# Patient Record
Sex: FEMALE | Race: WHITE | NOT HISPANIC OR LATINO | Employment: UNEMPLOYED | URBAN - METROPOLITAN AREA
[De-identification: names, ages, dates, MRNs, and addresses within clinical notes are randomized per-mention and may not be internally consistent; named-entity substitution may affect disease eponyms.]

---

## 2017-11-07 ENCOUNTER — OFFICE VISIT (OUTPATIENT)
Dept: OBSTETRICS AND GYNECOLOGY | Facility: CLINIC | Age: 57
End: 2017-11-07
Payer: MEDICAID

## 2017-11-07 VITALS
BODY MASS INDEX: 28.28 KG/M2 | DIASTOLIC BLOOD PRESSURE: 80 MMHG | HEIGHT: 65 IN | WEIGHT: 169.75 LBS | SYSTOLIC BLOOD PRESSURE: 140 MMHG

## 2017-11-07 DIAGNOSIS — R10.2 FEMALE PELVIC PAIN: ICD-10-CM

## 2017-11-07 DIAGNOSIS — Z80.3 FAMILY HISTORY OF BREAST CANCER IN MALE: ICD-10-CM

## 2017-11-07 DIAGNOSIS — N95.0 POSTMENOPAUSAL BLEEDING: ICD-10-CM

## 2017-11-07 DIAGNOSIS — Z01.411 ENCOUNTER FOR GYNECOLOGICAL EXAMINATION (GENERAL) (ROUTINE) WITH ABNORMAL FINDINGS: Primary | ICD-10-CM

## 2017-11-07 PROCEDURE — 99386 PREV VISIT NEW AGE 40-64: CPT | Mod: S$PBB,,, | Performed by: OBSTETRICS & GYNECOLOGY

## 2017-11-07 PROCEDURE — 87624 HPV HI-RISK TYP POOLED RSLT: CPT

## 2017-11-07 PROCEDURE — 99213 OFFICE O/P EST LOW 20 MIN: CPT | Mod: PBBFAC | Performed by: OBSTETRICS & GYNECOLOGY

## 2017-11-07 PROCEDURE — 99999 PR PBB SHADOW E&M-EST. PATIENT-LVL III: CPT | Mod: PBBFAC,,, | Performed by: OBSTETRICS & GYNECOLOGY

## 2017-11-07 PROCEDURE — 88175 CYTOPATH C/V AUTO FLUID REDO: CPT

## 2017-11-07 NOTE — PROGRESS NOTES
Chief Complaint   Patient presents with    Well Woman    Pelvic Pain    Low-back Pain       HPI:  57 y.o. female  presents as a new patient to me for a well woman exam    - Menopausal: YES    - History of abnormal paps: NO  - Postmenopausal bleedin EPISODES FOLLOWING INTERCOURSE; 1 WAS SPOTTING, THE OTHER WAS HEAVIER WITH CRAMPING  - History of abnormal mammograms: DENIES  - Family history of breast or ovarian cancer: MATERNAL UNCLE (MOTHER'S BROTHER) WITH BREAST CANCER  - Any breast masses, pain, skin changes, or nipple discharge: DENIES      Pap: 3/3/2014, NILM  Mammogram: , NORMAL PER PATIENT REPORT    Past Medical History:   Diagnosis Date    Colon polyps     Gastric bypass status for obesity 2006    Hypertension     Postmenopausal HRT (hormone replacement therapy)     Ulcer      Past Surgical History:   Procedure Laterality Date    ABDOMINAL SURGERY      BREAST BIOPSY  2005    right    CHOLECYSTECTOMY      GASTRIC BYPASS      zackary en Y    HERNIA REPAIR  x2    LI hernia    INFECTED SKIN DEBRIDEMENT  x3    KNEE ARTHROSCOPY W/ DEBRIDEMENT      right       Social History   Substance Use Topics    Smoking status: Never Smoker    Smokeless tobacco: Never Used    Alcohol use No      Comment:       Family History   Problem Relation Age of Onset    Cancer Mother      colon    Dementia Father     Hypertension Father     Hyperlipidemia Father     Alzheimer's disease Father 78    Cancer Paternal Grandfather      colon cancer     OB History    Para Term  AB Living   1       1     SAB TAB Ectopic Multiple Live Births   1              # Outcome Date GA Lbr Kuldeep/2nd Weight Sex Delivery Anes PTL Lv   1 SAB                   MEDICATIONS: Reviewed with patient.  ALLERGIES: Aspirin; Ibuprofen; and Inderal [propranolol]     ROS:  Review of Systems   Constitutional: Negative for fever.   Respiratory: Negative for shortness of breath.    Cardiovascular: Negative for chest pain.  "  Gastrointestinal: Negative for abdominal pain, nausea and vomiting.   Endocrine: Negative for hot flashes.   Genitourinary: Positive for pelvic pain and postmenopausal bleeding.   Neurological: Negative for headaches.   Hematological: Does not bruise/bleed easily.   Psychiatric/Behavioral: Negative for depression.   Breast: Negative for breast mass, breast pain, nipple discharge and skin changes      PHYSICAL EXAM:    BP (!) 140/80   Ht 5' 5" (1.651 m)   Wt 77 kg (169 lb 12.1 oz)   LMP 12/24/2012   BMI 28.25 kg/m²     Physical Exam:   Constitutional: She is oriented to person, place, and time. She appears well-developed.   No fever    HENT:   Head: Normocephalic.     Neck: No thyromegaly present.    Cardiovascular: Normal rate.     Pulmonary/Chest: Effort normal. Right breast exhibits no mass, no nipple discharge, no skin change, no tenderness and no swelling. Left breast exhibits no mass, no nipple discharge, no skin change, no tenderness and no swelling. Breasts are symmetrical.        Abdominal: She exhibits no mass. There is no hepatosplenomegaly. There is no tenderness.     Genitourinary: Vagina normal. Uterus is not enlarged and not tender. Cervix is normal. Right adnexum displays no tenderness and no fullness. Left adnexum displays no tenderness and no fullness. No vaginal discharge found.   Genitourinary Comments: External genitalia: Normal  Urethra: No tenderness; normal meatus  Bladder: No tenderness              Lymphadenopathy:     She has no cervical adenopathy.    Neurological: She is alert and oriented to person, place, and time.     Psychiatric: She has a normal mood and affect.         ASSESSMENT & PLAN:   Encounter for gynecological examination (general) (routine) with abnormal findings  -     Mammo Digital Screening Bilat with Tomosynthesis CAD; Future; Expected date: 11/07/2017  -     HPV High Risk Genotypes, PCR  -     Liquid-based pap smear, screening    Postmenopausal bleeding  -     US " Pelvis Complete Non OB; Future; Expected date: 11/07/2017    Female pelvic pain  -     US Pelvis Complete Non OB; Future; Expected date: 11/07/2017    Family history of breast cancer in male        - Breast and pelvic exam: NORMAL  - Patient was counseled on ASCCP guidelines for cervical cytology screening  - Cervical screening: CO-TESTING PERFORMED TODAY  - Patient was counseled on current recommendations for breast cancer screening  - Mammogram screening: WILL SCHEDULE    - She was counseled to follow up with her PCP for other routine health maintenance    - Will schedule pelvic u/s to evaluate patient's PMB and pelvic pain  - RTC 1 week after u/s for results and further w/u as indicated  - Patient counseled on possible need for endometrial biopsy    - 2nd degree male relative with breast cancer  - Per NCCN guidelines, patient meets criteria for genetic screening and possible BRCA testing  - Will arrange referral for genetic counseling

## 2017-11-08 ENCOUNTER — HOSPITAL ENCOUNTER (OUTPATIENT)
Dept: RADIOLOGY | Facility: OTHER | Age: 57
Discharge: HOME | End: 2017-11-08
Attending: OBSTETRICS & GYNECOLOGY
Payer: MEDICAID

## 2017-11-08 DIAGNOSIS — N95.0 POSTMENOPAUSAL BLEEDING: ICD-10-CM

## 2017-11-08 DIAGNOSIS — R10.2 FEMALE PELVIC PAIN: ICD-10-CM

## 2017-11-08 DIAGNOSIS — Z01.411 ENCOUNTER FOR GYNECOLOGICAL EXAMINATION (GENERAL) (ROUTINE) WITH ABNORMAL FINDINGS: ICD-10-CM

## 2017-11-08 PROCEDURE — 77067 SCR MAMMO BI INCL CAD: CPT | Mod: TC

## 2017-11-08 PROCEDURE — 76856 US EXAM PELVIC COMPLETE: CPT | Mod: TC

## 2017-11-08 PROCEDURE — 77067 SCR MAMMO BI INCL CAD: CPT | Mod: 26,,, | Performed by: INTERNAL MEDICINE

## 2017-11-08 PROCEDURE — 76830 TRANSVAGINAL US NON-OB: CPT | Mod: 26,,, | Performed by: INTERNAL MEDICINE

## 2017-11-08 PROCEDURE — 76856 US EXAM PELVIC COMPLETE: CPT | Mod: 26,,, | Performed by: INTERNAL MEDICINE

## 2017-11-08 PROCEDURE — 77063 BREAST TOMOSYNTHESIS BI: CPT | Mod: 26,,, | Performed by: INTERNAL MEDICINE

## 2017-11-10 LAB
HPV16 AG SPEC QL: NEGATIVE
HPV16+18+H RISK 12 DNA CVX-IMP: NEGATIVE
HPV18 DNA SPEC QL NAA+PROBE: NEGATIVE

## 2019-03-28 ENCOUNTER — PATIENT OUTREACH (OUTPATIENT)
Dept: ADMINISTRATIVE | Facility: HOSPITAL | Age: 59
End: 2019-03-28

## 2019-04-17 ENCOUNTER — HOSPITAL ENCOUNTER (INPATIENT)
Facility: HOSPITAL | Age: 59
LOS: 2 days | Discharge: HOME | DRG: 378 | End: 2019-04-19
Attending: EMERGENCY MEDICINE | Admitting: EMERGENCY MEDICINE
Payer: MEDICAID

## 2019-04-17 DIAGNOSIS — D64.9 SYMPTOMATIC ANEMIA: ICD-10-CM

## 2019-04-17 DIAGNOSIS — K92.2 UPPER GI BLEED: Primary | ICD-10-CM

## 2019-04-17 DIAGNOSIS — K92.1 MELENA: ICD-10-CM

## 2019-04-17 LAB
ABO + RH BLD: NORMAL
ALBUMIN SERPL BCP-MCNC: 2.1 G/DL (ref 3.5–5.2)
ALP SERPL-CCNC: 225 U/L (ref 55–135)
ALT SERPL W/O P-5'-P-CCNC: 22 U/L (ref 10–44)
ANION GAP SERPL CALC-SCNC: 12 MMOL/L (ref 8–16)
ANISOCYTOSIS BLD QL SMEAR: SLIGHT
APTT BLDCRRT: 23.5 SEC (ref 21–32)
AST SERPL-CCNC: 57 U/L (ref 10–40)
BASOPHILS # BLD AUTO: 0.03 K/UL (ref 0–0.2)
BASOPHILS NFR BLD: 0.5 % (ref 0–1.9)
BILIRUB SERPL-MCNC: 0.6 MG/DL (ref 0.1–1)
BLD GP AB SCN CELLS X3 SERPL QL: NORMAL
BUN SERPL-MCNC: 14 MG/DL (ref 6–20)
CALCIUM SERPL-MCNC: 8.5 MG/DL (ref 8.7–10.5)
CHLORIDE SERPL-SCNC: 105 MMOL/L (ref 95–110)
CO2 SERPL-SCNC: 22 MMOL/L (ref 23–29)
CREAT SERPL-MCNC: 0.8 MG/DL (ref 0.5–1.4)
DIFFERENTIAL METHOD: ABNORMAL
EOSINOPHIL # BLD AUTO: 0 K/UL (ref 0–0.5)
EOSINOPHIL NFR BLD: 0.3 % (ref 0–8)
ERYTHROCYTE [DISTWIDTH] IN BLOOD BY AUTOMATED COUNT: 22.1 % (ref 11.5–14.5)
EST. GFR  (AFRICAN AMERICAN): >60 ML/MIN/1.73 M^2
EST. GFR  (NON AFRICAN AMERICAN): >60 ML/MIN/1.73 M^2
GLUCOSE SERPL-MCNC: 118 MG/DL (ref 70–110)
HCT VFR BLD AUTO: 20.2 % (ref 37–48.5)
HGB BLD-MCNC: 6.3 G/DL (ref 12–16)
INR PPP: 0.9 (ref 0.8–1.2)
IRON SERPL-MCNC: 21 UG/DL (ref 30–160)
LIPASE SERPL-CCNC: 18 U/L (ref 4–60)
LYMPHOCYTES # BLD AUTO: 1.3 K/UL (ref 1–4.8)
LYMPHOCYTES NFR BLD: 20 % (ref 18–48)
MCH RBC QN AUTO: 33.5 PG (ref 27–31)
MCHC RBC AUTO-ENTMCNC: 31.2 G/DL (ref 32–36)
MCV RBC AUTO: 107 FL (ref 82–98)
MONOCYTES # BLD AUTO: 0.6 K/UL (ref 0.3–1)
MONOCYTES NFR BLD: 9.7 % (ref 4–15)
NEUTROPHILS # BLD AUTO: 4.4 K/UL (ref 1.8–7.7)
NEUTROPHILS NFR BLD: 70.4 % (ref 38–73)
PLATELET # BLD AUTO: 191 K/UL (ref 150–350)
PLATELET BLD QL SMEAR: ABNORMAL
PMV BLD AUTO: 9.4 FL (ref 9.2–12.9)
POLYCHROMASIA BLD QL SMEAR: ABNORMAL
POTASSIUM SERPL-SCNC: 3.5 MMOL/L (ref 3.5–5.1)
PROT SERPL-MCNC: 5.4 G/DL (ref 6–8.4)
PROTHROMBIN TIME: 9.8 SEC (ref 9–12.5)
RBC # BLD AUTO: 1.88 M/UL (ref 4–5.4)
SATURATED IRON: 11 % (ref 20–50)
SODIUM SERPL-SCNC: 139 MMOL/L (ref 136–145)
TOTAL IRON BINDING CAPACITY: 186 UG/DL (ref 250–450)
TRANSFERRIN SERPL-MCNC: 126 MG/DL (ref 200–375)
WBC # BLD AUTO: 6.39 K/UL (ref 3.9–12.7)

## 2019-04-17 PROCEDURE — 82746 ASSAY OF FOLIC ACID SERUM: CPT

## 2019-04-17 PROCEDURE — 85610 PROTHROMBIN TIME: CPT

## 2019-04-17 PROCEDURE — C9113 INJ PANTOPRAZOLE SODIUM, VIA: HCPCS | Performed by: EMERGENCY MEDICINE

## 2019-04-17 PROCEDURE — 36430 TRANSFUSION BLD/BLD COMPNT: CPT

## 2019-04-17 PROCEDURE — 25000003 PHARM REV CODE 250: Performed by: EMERGENCY MEDICINE

## 2019-04-17 PROCEDURE — 83690 ASSAY OF LIPASE: CPT

## 2019-04-17 PROCEDURE — 80053 COMPREHEN METABOLIC PANEL: CPT

## 2019-04-17 PROCEDURE — 99285 EMERGENCY DEPT VISIT HI MDM: CPT

## 2019-04-17 PROCEDURE — 82607 VITAMIN B-12: CPT

## 2019-04-17 PROCEDURE — 96366 THER/PROPH/DIAG IV INF ADDON: CPT

## 2019-04-17 PROCEDURE — 85730 THROMBOPLASTIN TIME PARTIAL: CPT

## 2019-04-17 PROCEDURE — 86920 COMPATIBILITY TEST SPIN: CPT

## 2019-04-17 PROCEDURE — 85025 COMPLETE CBC W/AUTO DIFF WBC: CPT

## 2019-04-17 PROCEDURE — 83615 LACTATE (LD) (LDH) ENZYME: CPT

## 2019-04-17 PROCEDURE — 63600175 PHARM REV CODE 636 W HCPCS: Performed by: EMERGENCY MEDICINE

## 2019-04-17 PROCEDURE — 21400001 HC TELEMETRY ROOM

## 2019-04-17 PROCEDURE — 96365 THER/PROPH/DIAG IV INF INIT: CPT

## 2019-04-17 PROCEDURE — P9021 RED BLOOD CELLS UNIT: HCPCS

## 2019-04-17 PROCEDURE — 86901 BLOOD TYPING SEROLOGIC RH(D): CPT

## 2019-04-17 PROCEDURE — 83540 ASSAY OF IRON: CPT

## 2019-04-17 PROCEDURE — 85045 AUTOMATED RETICULOCYTE COUNT: CPT

## 2019-04-17 PROCEDURE — 96375 TX/PRO/DX INJ NEW DRUG ADDON: CPT

## 2019-04-17 RX ORDER — ONDANSETRON 2 MG/ML
4 INJECTION INTRAMUSCULAR; INTRAVENOUS
Status: COMPLETED | OUTPATIENT
Start: 2019-04-17 | End: 2019-04-17

## 2019-04-17 RX ORDER — ACETAMINOPHEN 325 MG/1
650 TABLET ORAL EVERY 8 HOURS PRN
Status: DISCONTINUED | OUTPATIENT
Start: 2019-04-17 | End: 2019-04-19 | Stop reason: HOSPADM

## 2019-04-17 RX ORDER — PANTOPRAZOLE SODIUM 40 MG/10ML
80 INJECTION, POWDER, LYOPHILIZED, FOR SOLUTION INTRAVENOUS
Status: COMPLETED | OUTPATIENT
Start: 2019-04-17 | End: 2019-04-17

## 2019-04-17 RX ORDER — HYDROCODONE BITARTRATE AND ACETAMINOPHEN 500; 5 MG/1; MG/1
TABLET ORAL
Status: DISCONTINUED | OUTPATIENT
Start: 2019-04-17 | End: 2019-04-19 | Stop reason: HOSPADM

## 2019-04-17 RX ADMIN — ACETAMINOPHEN 650 MG: 325 TABLET, FILM COATED ORAL at 11:04

## 2019-04-17 RX ADMIN — ONDANSETRON 4 MG: 2 INJECTION INTRAMUSCULAR; INTRAVENOUS at 08:04

## 2019-04-17 RX ADMIN — PANTOPRAZOLE SODIUM 80 MG: 40 INJECTION, POWDER, FOR SOLUTION INTRAVENOUS at 06:04

## 2019-04-17 RX ADMIN — PANTOPRAZOLE SODIUM 8 MG/HR: 40 INJECTION, POWDER, FOR SOLUTION INTRAVENOUS at 06:04

## 2019-04-17 RX ADMIN — SODIUM CHLORIDE: 0.9 INJECTION, SOLUTION INTRAVENOUS at 10:04

## 2019-04-17 RX ADMIN — PANTOPRAZOLE SODIUM 8 MG/HR: 40 INJECTION, POWDER, FOR SOLUTION INTRAVENOUS at 10:04

## 2019-04-17 NOTE — ED PROVIDER NOTES
Encounter Date: 4/17/2019     This is a 58 y.o. female complaining of weakness, lightheadedness, and shortness of breath. Reports foul-smelling stools with probable GI bleeding.    I have evaluated and conducted a medical screening exam with initial orders entered, if indicated, to expedite care. The patient will be placed in a treatment area when one is available. Care will be transferred to an alternate provider for a full assessment including but not limited to: history, physical exam, additional orders, and final disposition.    Grey Wyatt NP      SCRIBE #1 NOTE: INorris, am scribing for, and in the presence of,  Dalia Royal MD. I have scribed the following portions of the note - Other sections scribed: HPI and ROS.       History     Chief Complaint   Patient presents with    Rectal Bleeding     Pt reports 5 day hx of dark stool and bright red bleeding from rectum. Pt states it is the most foul smelling stool she has ever had.      CC: Bloody Stools    HPI: Patient is a 58-year-old female PMHx HTN, postmenopausal hormone replacement therapy and PSHx cholecystectomy, gastric sleeve bypass (2006), hernia repair who presents for emergent consideration of bloody stools since Sat 4 days ago. Patient describes her stool as coffee grounds with bright red blood mixed in. She reports bright red blood on the tissue when wiping. She reports associated fatigue, lightheadedness, SOB with exertion, nausea. Patient states she has difficulty standing more than 5 minutes at a time secondary to fatigue.. She denies chest pain, emesis. She does have a history of bleeding ulcers in 2013. Her last colonoscopy was 2 years ago. She denies use of NSAIDs but reports epigastric discomfort over the past several months and using frequent Tums. No alcohol use. She has not followed up with a bariatric clinic in several years, she states she recently started taking a multi vitamin, she has not had B12 level or iron level  "checked in years. She drinks protein shakes but states only eats very small amounts at a time.           Review of patient's allergies indicates:   Allergen Reactions    Aspirin     Ibuprofen      Other reaction(s): Due To Gastric Bypass  Due to gastric bypass    Inderal [propranolol]      "Felt bizarre"     Past Medical History:   Diagnosis Date    Colon polyps     Gastric bypass status for obesity 2006    Hypertension     Postmenopausal HRT (hormone replacement therapy)     Ulcer      Past Surgical History:   Procedure Laterality Date    ABDOMINAL SURGERY      BREAST BIOPSY  2005    right    CHOLECYSTECTOMY      COLONOSCOPY N/A 3/21/2014    [facility])    EGD (ESOPHAGOGASTRODUODENOSCOPY) N/A 9/27/2013    [facility]formed [facility]    GASTRIC BYPASS      zackary en Y    HERNIA REPAIR  x2    LI hernia    INFECTED SKIN DEBRIDEMENT  x3    KNEE ARTHROSCOPY W/ DEBRIDEMENT      right     Family History   Problem Relation Age of Onset    Cancer Mother         colon    Dementia Father     Hypertension Father     Hyperlipidemia Father     Alzheimer's disease Father 78    Cancer Paternal Grandfather         colon cancer    Breast cancer Maternal Uncle      Social History     Tobacco Use    Smoking status: Never Smoker    Smokeless tobacco: Never Used   Substance Use Topics    Alcohol use: Yes     Alcohol/week: 0.0 oz     Comment: occasional    Drug use: No     Review of Systems   Constitutional: Positive for fatigue.   HENT: Negative for facial swelling.    Eyes: Negative for visual disturbance.   Respiratory: Positive for shortness of breath. Negative for cough.    Cardiovascular: Negative for chest pain.   Gastrointestinal: Positive for abdominal pain, blood in stool and nausea. Negative for vomiting.   Genitourinary: Negative for dysuria.   Musculoskeletal: Positive for myalgias (LEs).   Allergic/Immunologic: Negative for " immunocompromised state.   Neurological: Positive for light-headedness.   All other systems reviewed and are negative.      Physical Exam     Initial Vitals [04/17/19 1630]   BP Pulse Resp Temp SpO2   105/68 105 20 98.5 °F (36.9 °C) 100 %      MAP       --         Physical Exam    Constitutional: She appears well-developed and well-nourished. She is not diaphoretic. No distress.   HENT:   Mouth/Throat: Oropharynx is clear and moist.   Eyes: Conjunctivae are normal. No scleral icterus.   Pale conjunctiva   Neck: Neck supple.   Cardiovascular: Regular rhythm. Tachycardia present.    Pulmonary/Chest: Breath sounds normal. No respiratory distress.   Abdominal: Soft. Bowel sounds are normal. She exhibits no distension. There is no tenderness.   Genitourinary: Rectal exam shows external hemorrhoid and guaiac positive stool. Rectal exam shows anal tone normal. Guaiac positive stool. : Acceptable.  Genitourinary Comments: Melenotic stool on SHARYN   Neurological: She is alert and oriented to person, place, and time.         ED Course   Critical Care  Date/Time: 5/7/2019 3:13 PM  Performed by: Dalia Royal MD  Authorized by: Leonila Alonzo MD   Direct patient critical care time: 15 minutes  Ordering / reviewing critical care time: 10 minutes  Documentation critical care time: 5 minutes  Consulting other physicians critical care time: 5 minutes  Total critical care time (exclusive of procedural time) : 35 minutes  Critical care was necessary to treat or prevent imminent or life-threatening deterioration of the following conditions: circulatory failure.  Critical care was time spent personally by me on the following activities: discussions with consultants, examination of patient, obtaining history from patient or surrogate, ordering and performing treatments and interventions and ordering and review of laboratory studies.        Labs Reviewed   CBC W/ AUTO DIFFERENTIAL - Abnormal; Notable for the  following components:       Result Value    RBC 1.88 (*)     Hemoglobin 6.3 (*)     Hematocrit 20.2 (*)      (*)     MCH 33.5 (*)     MCHC 31.2 (*)     RDW 22.1 (*)     All other components within normal limits   COMPREHENSIVE METABOLIC PANEL - Abnormal; Notable for the following components:    CO2 22 (*)     Glucose 118 (*)     Calcium 8.5 (*)     Total Protein 5.4 (*)     Albumin 2.1 (*)     Alkaline Phosphatase 225 (*)     AST 57 (*)     All other components within normal limits   IRON AND TIBC - Abnormal; Notable for the following components:    Iron 21 (*)     Transferrin 126 (*)     TIBC 186 (*)     Saturated Iron 11 (*)     All other components within normal limits   LIPASE   PROTIME-INR   APTT   VITAMIN B12   FOLATE   TYPE & SCREEN   PREPARE RBC SOFT          Imaging Results    None          Medical Decision Making:   Initial Assessment:   59 yo F s/p gastric sleeve bypass 2006 presents with melena and fatige, generalized weakness. Suspect UGIB, other considerations electrolyte disturbance, vitamin deficiency. I considered but doubt PE v. New onset CHF based on history and exam. Work up significant for megaloblastic anemia.B12 level, folate, iron and TIBC added on, consented for blood transfusion, started on PPI gtt. Explained lab results with patient and reviewed care plan.             Scribe Attestation:   Scribe #1: I performed the above scribed service and the documentation accurately describes the services I performed. I attest to the accuracy of the note.    Attending Attestation:           Physician Attestation for Scribe:  Physician Attestation Statement for Scribe #1: I, Dalia Royal MD, reviewed documentation, as scribed by Norris Mckee in my presence, and it is both accurate and complete.                 ED Course as of Apr 17 2224 Wed Apr 17, 2019 1947 Discussed case with Dr. Yan (GI)- agrees with transfusion, NPO after midnight for likely scope in a.m..  I discussed this case  with Dr. Mcgovern for admission to the hospital.    [LH]      ED Course User Index  [] Dalia Royal MD     Clinical Impression:       ICD-10-CM ICD-9-CM   1. Upper GI bleed K92.2 578.9   2. Symptomatic anemia D64.9 285.9   3. Melena K92.1 578.1                                Dalia Royal MD  04/17/19 2223       Dalia Royal MD  05/07/19 9096

## 2019-04-18 ENCOUNTER — ANESTHESIA (OUTPATIENT)
Dept: ENDOSCOPY | Facility: HOSPITAL | Age: 59
DRG: 378 | End: 2019-04-18
Payer: MEDICAID

## 2019-04-18 ENCOUNTER — ANESTHESIA EVENT (OUTPATIENT)
Dept: ENDOSCOPY | Facility: HOSPITAL | Age: 59
DRG: 378 | End: 2019-04-18
Payer: MEDICAID

## 2019-04-18 PROBLEM — D50.9 IRON DEFICIENCY ANEMIA: Status: ACTIVE | Noted: 2019-04-18

## 2019-04-18 PROBLEM — K92.2 UPPER GI BLEED: Status: ACTIVE | Noted: 2019-04-18

## 2019-04-18 LAB
ANION GAP SERPL CALC-SCNC: 8 MMOL/L (ref 8–16)
BASOPHILS # BLD AUTO: 0.02 K/UL (ref 0–0.2)
BASOPHILS # BLD AUTO: 0.03 K/UL (ref 0–0.2)
BASOPHILS NFR BLD: 0.4 % (ref 0–1.9)
BASOPHILS NFR BLD: 0.7 % (ref 0–1.9)
BLD PROD TYP BPU: NORMAL
BLD PROD TYP BPU: NORMAL
BLOOD UNIT EXPIRATION DATE: NORMAL
BLOOD UNIT EXPIRATION DATE: NORMAL
BLOOD UNIT TYPE CODE: 5100
BLOOD UNIT TYPE CODE: 5100
BLOOD UNIT TYPE: NORMAL
BLOOD UNIT TYPE: NORMAL
BUN SERPL-MCNC: 11 MG/DL (ref 6–20)
CALCIUM SERPL-MCNC: 8.3 MG/DL (ref 8.7–10.5)
CHLORIDE SERPL-SCNC: 107 MMOL/L (ref 95–110)
CO2 SERPL-SCNC: 24 MMOL/L (ref 23–29)
CODING SYSTEM: NORMAL
CODING SYSTEM: NORMAL
CREAT SERPL-MCNC: 0.8 MG/DL (ref 0.5–1.4)
DIFFERENTIAL METHOD: ABNORMAL
DIFFERENTIAL METHOD: ABNORMAL
DISPENSE STATUS: NORMAL
DISPENSE STATUS: NORMAL
EOSINOPHIL # BLD AUTO: 0 K/UL (ref 0–0.5)
EOSINOPHIL # BLD AUTO: 0.1 K/UL (ref 0–0.5)
EOSINOPHIL NFR BLD: 0.4 % (ref 0–8)
EOSINOPHIL NFR BLD: 1.2 % (ref 0–8)
ERYTHROCYTE [DISTWIDTH] IN BLOOD BY AUTOMATED COUNT: 20.7 % (ref 11.5–14.5)
ERYTHROCYTE [DISTWIDTH] IN BLOOD BY AUTOMATED COUNT: 22 % (ref 11.5–14.5)
EST. GFR  (AFRICAN AMERICAN): >60 ML/MIN/1.73 M^2
EST. GFR  (NON AFRICAN AMERICAN): >60 ML/MIN/1.73 M^2
FOLATE SERPL-MCNC: 10.5 NG/ML (ref 4–24)
GLUCOSE SERPL-MCNC: 101 MG/DL (ref 70–110)
HCT VFR BLD AUTO: 26.7 % (ref 37–48.5)
HCT VFR BLD AUTO: 27.1 % (ref 37–48.5)
HGB BLD-MCNC: 8.1 G/DL (ref 12–16)
HGB BLD-MCNC: 8.4 G/DL (ref 12–16)
LDH SERPL L TO P-CCNC: 293 U/L (ref 110–260)
LYMPHOCYTES # BLD AUTO: 0.5 K/UL (ref 1–4.8)
LYMPHOCYTES # BLD AUTO: 0.7 K/UL (ref 1–4.8)
LYMPHOCYTES NFR BLD: 17.3 % (ref 18–48)
LYMPHOCYTES NFR BLD: 9.8 % (ref 18–48)
MCH RBC QN AUTO: 29.6 PG (ref 27–31)
MCH RBC QN AUTO: 30.5 PG (ref 27–31)
MCHC RBC AUTO-ENTMCNC: 30.3 G/DL (ref 32–36)
MCHC RBC AUTO-ENTMCNC: 31 G/DL (ref 32–36)
MCV RBC AUTO: 97 FL (ref 82–98)
MCV RBC AUTO: 99 FL (ref 82–98)
MONOCYTES # BLD AUTO: 0.5 K/UL (ref 0.3–1)
MONOCYTES # BLD AUTO: 0.5 K/UL (ref 0.3–1)
MONOCYTES NFR BLD: 10.2 % (ref 4–15)
MONOCYTES NFR BLD: 11.1 % (ref 4–15)
NEUTROPHILS # BLD AUTO: 2.9 K/UL (ref 1.8–7.7)
NEUTROPHILS # BLD AUTO: 3.6 K/UL (ref 1.8–7.7)
NEUTROPHILS NFR BLD: 68.5 % (ref 38–73)
NEUTROPHILS NFR BLD: 79.2 % (ref 38–73)
PLATELET # BLD AUTO: 133 K/UL (ref 150–350)
PLATELET # BLD AUTO: 138 K/UL (ref 150–350)
PMV BLD AUTO: 9.6 FL (ref 9.2–12.9)
PMV BLD AUTO: 9.9 FL (ref 9.2–12.9)
POTASSIUM SERPL-SCNC: 3.4 MMOL/L (ref 3.5–5.1)
RBC # BLD AUTO: 2.74 M/UL (ref 4–5.4)
RBC # BLD AUTO: 2.75 M/UL (ref 4–5.4)
RETICS/RBC NFR AUTO: 15.6 % (ref 0.5–2.5)
SODIUM SERPL-SCNC: 139 MMOL/L (ref 136–145)
TRANS ERYTHROCYTES VOL PATIENT: NORMAL ML
TRANS ERYTHROCYTES VOL PATIENT: NORMAL ML
VIT B12 SERPL-MCNC: 1520 PG/ML (ref 210–950)
WBC # BLD AUTO: 4.22 K/UL (ref 3.9–12.7)
WBC # BLD AUTO: 4.59 K/UL (ref 3.9–12.7)

## 2019-04-18 PROCEDURE — 37000009 HC ANESTHESIA EA ADD 15 MINS: Performed by: INTERNAL MEDICINE

## 2019-04-18 PROCEDURE — P9021 RED BLOOD CELLS UNIT: HCPCS

## 2019-04-18 PROCEDURE — D9220A PRA ANESTHESIA: ICD-10-PCS | Mod: CRNA,,, | Performed by: NURSE ANESTHETIST, CERTIFIED REGISTERED

## 2019-04-18 PROCEDURE — 63600175 PHARM REV CODE 636 W HCPCS: Performed by: EMERGENCY MEDICINE

## 2019-04-18 PROCEDURE — C9113 INJ PANTOPRAZOLE SODIUM, VIA: HCPCS | Performed by: HOSPITALIST

## 2019-04-18 PROCEDURE — 25000003 PHARM REV CODE 250: Performed by: EMERGENCY MEDICINE

## 2019-04-18 PROCEDURE — 25000003 PHARM REV CODE 250: Performed by: HOSPITALIST

## 2019-04-18 PROCEDURE — 37000008 HC ANESTHESIA 1ST 15 MINUTES: Performed by: INTERNAL MEDICINE

## 2019-04-18 PROCEDURE — 25000003 PHARM REV CODE 250: Performed by: INTERNAL MEDICINE

## 2019-04-18 PROCEDURE — 63600175 PHARM REV CODE 636 W HCPCS: Performed by: NURSE ANESTHETIST, CERTIFIED REGISTERED

## 2019-04-18 PROCEDURE — D9220A PRA ANESTHESIA: Mod: ANES,,, | Performed by: ANESTHESIOLOGY

## 2019-04-18 PROCEDURE — 21400001 HC TELEMETRY ROOM

## 2019-04-18 PROCEDURE — 85025 COMPLETE CBC W/AUTO DIFF WBC: CPT

## 2019-04-18 PROCEDURE — 63600175 PHARM REV CODE 636 W HCPCS: Performed by: HOSPITALIST

## 2019-04-18 PROCEDURE — 36415 COLL VENOUS BLD VENIPUNCTURE: CPT

## 2019-04-18 PROCEDURE — 84425 ASSAY OF VITAMIN B-1: CPT

## 2019-04-18 PROCEDURE — D9220A PRA ANESTHESIA: ICD-10-PCS | Mod: ANES,,, | Performed by: ANESTHESIOLOGY

## 2019-04-18 PROCEDURE — 43235 EGD DIAGNOSTIC BRUSH WASH: CPT | Performed by: INTERNAL MEDICINE

## 2019-04-18 PROCEDURE — 94761 N-INVAS EAR/PLS OXIMETRY MLT: CPT

## 2019-04-18 PROCEDURE — 80048 BASIC METABOLIC PNL TOTAL CA: CPT

## 2019-04-18 PROCEDURE — C9113 INJ PANTOPRAZOLE SODIUM, VIA: HCPCS | Performed by: EMERGENCY MEDICINE

## 2019-04-18 PROCEDURE — D9220A PRA ANESTHESIA: Mod: CRNA,,, | Performed by: NURSE ANESTHETIST, CERTIFIED REGISTERED

## 2019-04-18 RX ORDER — MORPHINE SULFATE 10 MG/ML
2 INJECTION INTRAMUSCULAR; INTRAVENOUS; SUBCUTANEOUS EVERY 4 HOURS PRN
Status: DISCONTINUED | OUTPATIENT
Start: 2019-04-18 | End: 2019-04-18

## 2019-04-18 RX ORDER — BISACODYL 5 MG
10 TABLET, DELAYED RELEASE (ENTERIC COATED) ORAL DAILY
Status: DISCONTINUED | OUTPATIENT
Start: 2019-04-18 | End: 2019-04-19 | Stop reason: HOSPADM

## 2019-04-18 RX ORDER — PROPOFOL 10 MG/ML
VIAL (ML) INTRAVENOUS
Status: COMPLETED
Start: 2019-04-18 | End: 2019-04-18

## 2019-04-18 RX ORDER — SODIUM CHLORIDE 0.9 % (FLUSH) 0.9 %
10 SYRINGE (ML) INJECTION
Status: DISCONTINUED | OUTPATIENT
Start: 2019-04-18 | End: 2019-04-19 | Stop reason: HOSPADM

## 2019-04-18 RX ORDER — PANTOPRAZOLE SODIUM 40 MG/1
40 TABLET, DELAYED RELEASE ORAL DAILY
Qty: 30 TABLET | Refills: 11 | Status: SHIPPED | OUTPATIENT
Start: 2019-06-18 | End: 2019-04-19 | Stop reason: SDUPTHER

## 2019-04-18 RX ORDER — POLYETHYLENE GLYCOL 3350 17 G/17G
17 POWDER, FOR SOLUTION ORAL 2 TIMES DAILY
Status: DISCONTINUED | OUTPATIENT
Start: 2019-04-18 | End: 2019-04-19 | Stop reason: HOSPADM

## 2019-04-18 RX ORDER — LIDOCAINE HCL/PF 100 MG/5ML
SYRINGE (ML) INTRAVENOUS
Status: DISCONTINUED | OUTPATIENT
Start: 2019-04-18 | End: 2019-04-18

## 2019-04-18 RX ORDER — PANTOPRAZOLE SODIUM 40 MG/1
40 TABLET, DELAYED RELEASE ORAL 2 TIMES DAILY
Status: DISCONTINUED | OUTPATIENT
Start: 2019-04-18 | End: 2019-04-19 | Stop reason: HOSPADM

## 2019-04-18 RX ORDER — ONDANSETRON 2 MG/ML
4 INJECTION INTRAMUSCULAR; INTRAVENOUS EVERY 8 HOURS PRN
Status: DISCONTINUED | OUTPATIENT
Start: 2019-04-18 | End: 2019-04-18

## 2019-04-18 RX ORDER — SUCRALFATE 1 G/1
1 TABLET ORAL
Status: DISCONTINUED | OUTPATIENT
Start: 2019-04-18 | End: 2019-04-18 | Stop reason: SDUPTHER

## 2019-04-18 RX ORDER — SUCRALFATE 1 G/10ML
1 SUSPENSION ORAL EVERY 6 HOURS
Status: DISCONTINUED | OUTPATIENT
Start: 2019-04-19 | End: 2019-04-19 | Stop reason: HOSPADM

## 2019-04-18 RX ORDER — PROPOFOL 10 MG/ML
VIAL (ML) INTRAVENOUS
Status: DISCONTINUED | OUTPATIENT
Start: 2019-04-18 | End: 2019-04-18

## 2019-04-18 RX ORDER — ONDANSETRON 2 MG/ML
4 INJECTION INTRAMUSCULAR; INTRAVENOUS EVERY 8 HOURS PRN
Status: DISCONTINUED | OUTPATIENT
Start: 2019-04-18 | End: 2019-04-19 | Stop reason: HOSPADM

## 2019-04-18 RX ORDER — SUCRALFATE 1 G/1
1 TABLET ORAL
Qty: 56 TABLET | Refills: 0 | Status: SHIPPED | OUTPATIENT
Start: 2019-04-18 | End: 2019-04-19

## 2019-04-18 RX ORDER — PANTOPRAZOLE SODIUM 40 MG/1
40 TABLET, DELAYED RELEASE ORAL 2 TIMES DAILY
Qty: 60 TABLET | Refills: 1 | Status: SHIPPED | OUTPATIENT
Start: 2019-04-18 | End: 2019-04-19

## 2019-04-18 RX ORDER — LIDOCAINE HYDROCHLORIDE 20 MG/ML
INJECTION, SOLUTION EPIDURAL; INFILTRATION; INTRACAUDAL; PERINEURAL
Status: DISPENSED
Start: 2019-04-18 | End: 2019-04-19

## 2019-04-18 RX ORDER — HYDROCODONE BITARTRATE AND ACETAMINOPHEN 5; 325 MG/1; MG/1
1 TABLET ORAL EVERY 6 HOURS PRN
Status: DISCONTINUED | OUTPATIENT
Start: 2019-04-18 | End: 2019-04-19 | Stop reason: HOSPADM

## 2019-04-18 RX ORDER — POTASSIUM CHLORIDE 20 MEQ/1
40 TABLET, EXTENDED RELEASE ORAL ONCE
Status: COMPLETED | OUTPATIENT
Start: 2019-04-18 | End: 2019-04-18

## 2019-04-18 RX ADMIN — LIDOCAINE HYDROCHLORIDE 100 MG: 20 INJECTION, SOLUTION INTRAVENOUS at 12:04

## 2019-04-18 RX ADMIN — POLYETHYLENE GLYCOL 3350 17 G: 17 POWDER, FOR SOLUTION ORAL at 09:04

## 2019-04-18 RX ADMIN — POLYETHYLENE GLYCOL 3350 17 G: 17 POWDER, FOR SOLUTION ORAL at 02:04

## 2019-04-18 RX ADMIN — POTASSIUM CHLORIDE 40 MEQ: 1500 TABLET, EXTENDED RELEASE ORAL at 09:04

## 2019-04-18 RX ADMIN — SUCRALFATE 1 G: 1 TABLET ORAL at 04:04

## 2019-04-18 RX ADMIN — PANTOPRAZOLE SODIUM 8 MG/HR: 40 INJECTION, POWDER, FOR SOLUTION INTRAVENOUS at 03:04

## 2019-04-18 RX ADMIN — ACETAMINOPHEN 650 MG: 325 TABLET, FILM COATED ORAL at 04:04

## 2019-04-18 RX ADMIN — PROPOFOL 100 MG: 10 INJECTION, EMULSION INTRAVENOUS at 12:04

## 2019-04-18 RX ADMIN — PANTOPRAZOLE SODIUM 40 MG: 40 TABLET, DELAYED RELEASE ORAL at 09:04

## 2019-04-18 RX ADMIN — PANTOPRAZOLE SODIUM 8 MG/HR: 40 INJECTION, POWDER, FOR SOLUTION INTRAVENOUS at 09:04

## 2019-04-18 RX ADMIN — BISACODYL 10 MG: 5 TABLET, COATED ORAL at 02:04

## 2019-04-18 RX ADMIN — PROPOFOL 50 MG: 10 INJECTION, EMULSION INTRAVENOUS at 12:04

## 2019-04-18 RX ADMIN — PROPOFOL 30 MG: 10 INJECTION, EMULSION INTRAVENOUS at 12:04

## 2019-04-18 NOTE — DISCHARGE INSTRUCTIONS
Two medications for ulcer  1. Pantoprazole (protonix)- take 40mg twice a day for 2 months, then take 40mg once a day  2. Sucralfate- take 1g 4 times a day for 2 weeks

## 2019-04-18 NOTE — PLAN OF CARE
Problem: Fatigue  Goal: Improved Activity Tolerance    Intervention: Promote Energy Conservation     04/18/19 0115 04/18/19 0529   Monitor and Manage Anemia   Fatigue Management  --  activity assistance provided   Prevent or Manage Pain   Sleep/Rest Enhancement  --  awakenings minimized;consistent schedule promoted   Promote Airway Secretion Clearance   Activity Management bridge - L1  --          Comments: Administered two units of blood. Color looks less pale. Pt admits that she feels better. Used bedpan during shift. Skin intact. N c/o pain, nausea or dizziness. SR on telemetry monitor.

## 2019-04-18 NOTE — NURSING
Note that patient to Endoscopy;   Fresh gown prior to leaving the floor;   Also, patient urinated 300 cc, SCD's on, continuous Protonix and on RA;    Will continue to monitor;

## 2019-04-18 NOTE — SUBJECTIVE & OBJECTIVE
Interval History:  Mild epigastric tenderness. No other complaints. No BMs since admit, so no further blood visualized     Review of Systems   Constitutional: Negative for chills and fever.   Respiratory: Negative for cough, shortness of breath and wheezing.    Cardiovascular: Negative for chest pain, palpitations and leg swelling.   Gastrointestinal: Positive for abdominal pain. Negative for abdominal distention, constipation, diarrhea, nausea and vomiting.   Genitourinary: Negative for difficulty urinating and dysuria.   Musculoskeletal: Negative for arthralgias and myalgias.   Skin: Positive for pallor. Negative for rash and wound.   Neurological: Negative for dizziness, light-headedness and headaches.     Objective:     Vital Signs (Most Recent):  Temp: 99.3 °F (37.4 °C) (04/18/19 0734)  Pulse: 89 (04/18/19 0734)  Resp: 17 (04/18/19 0734)  BP: 114/71 (04/18/19 0734)  SpO2: 98 % (04/18/19 0734) Vital Signs (24h Range):  Temp:  [98 °F (36.7 °C)-99.7 °F (37.6 °C)] 99.3 °F (37.4 °C)  Pulse:  [] 89  Resp:  [16-23] 17  SpO2:  [96 %-100 %] 98 %  BP: (104-128)/(61-77) 114/71     Weight: 72.8 kg (160 lb 7.9 oz)  Body mass index is 25.9 kg/m².    Intake/Output Summary (Last 24 hours) at 4/18/2019 1130  Last data filed at 4/18/2019 0800  Gross per 24 hour   Intake 1011.33 ml   Output 700 ml   Net 311.33 ml      Physical Exam   Constitutional: She appears well-developed and well-nourished. No distress.   HENT:   Head: Normocephalic and atraumatic.   Nose: Nose normal.   Mouth/Throat: Oropharynx is clear and moist.   Eyes: EOM are normal. No scleral icterus.   Conjunctival pallor   Neck: Neck supple.   Cardiovascular: Normal rate, regular rhythm, normal heart sounds and intact distal pulses. Exam reveals no gallop and no friction rub.   No murmur heard.  Pulmonary/Chest: Effort normal and breath sounds normal. No stridor. No respiratory distress. She has no wheezes. She has no rales.   Abdominal: Soft. Bowel sounds are  normal. She exhibits no distension and no mass. There is no tenderness. There is no guarding.   Musculoskeletal: She exhibits no edema.   Neurological: She is alert.   Skin: Skin is warm and dry. No rash noted. She is not diaphoretic. No erythema. There is pallor.   Nursing note and vitals reviewed.      Significant Labs: All pertinent labs within the past 24 hours have been reviewed.    Significant Imaging: I have reviewed and interpreted all pertinent imaging results/findings within the past 24 hours.

## 2019-04-18 NOTE — HOSPITAL COURSE
"Admitted with concerns for upper GI bleed causing acute blood loss anemia.  She was transfused 2 units of blood for hemoglobin 6.3 with appropriate response to 8.1. Started on PPI gtt. She has not had no further bowel movements.  GI consulted. EGD 4/18 showed: "Evidence of a gastric bypass was found. A gastric pouch with a small size was found. The staple line appeared intact. The gastrojejunal anastomosis was characterized by edema, erythema, friable mucosa, inflammation, mild stenosis and a large ulceration with clean based." This is likely the cause of her acute blood loss anemia. Did very well afterwards and had no complaints other than constipation. No nausea or vomiting. Is to continue pantoprazole 40 mg BID and sucralfate x 2 weeks, then pantoprazole daily as per GI recs. Is to follow up as outpatient. If bleeding recurs, she may need to revisit surgical options. Heart healthy diet. Activity as tolerated. SW to arrange f/u.     Of note, there is an abdominal XR report in her chart from 4/18 but SHE DID NOT HAVE ANY XRAYS DONE. This was ERRONEOUSLY put in her chart and is actually the XR of her neighbor in the adjacent room. Radiology is aware of the issue and working to fix it and remove this XR reading from her chart.   "

## 2019-04-18 NOTE — HPI
Alicia Chu is a 58 y.o. female that (in part)  has a past medical history of Colon polyps, Gastric bypass status for obesity, Hypertension, Postmenopausal HRT (hormone replacement therapy), and Ulcer.  has a past surgical history that includes Cholecystectomy; Gastric bypass; Hernia repair (x2); Knee arthroscopy w/ debridement; Infected skin debridement (x3); Breast biopsy (2005); and Abdominal surgery. Presents to [facility] With rectal bleeding.  Five day duration.  Having right bright red blood per rectum.  Foul-smelling.  Associated with abdominal cramping.  Symptoms began acutely on Saturday.  Describes stool as having coffee-grounds followed by bright red blood.  Noticed associated fatigue, lightheadedness, shortness of breath, dyspnea with exertion, and nausea.  History of bleeding ulcers from 6 years ago.  She had a colonoscopy 2 years ago.  Complains of gastric reflux symptoms.  Denies heavy use of NSAIDs or alcohol.  No recent visits with bariatric clinic.      In the emergency department routine laboratory studies were obtained which revealed a hemoglobin of 6.3 and an MCV of 107.  The patient recently started taking a multivitamin, but has not taken B12 or checked her iron level and many years she states.  2 units of packed red blood cells were typed and cross-matched for transfusion.  On physical exam external hemorrhoid and guaiac-positive stools were identified. GI was consulted for suspected upper GI bleed possibly secondary gastric or peptic ulcer.  She was given a Protonix infusion.    Hospital medicine has been asked to admit for further evaluation and treatment.

## 2019-04-18 NOTE — PLAN OF CARE
04/18/19 1337   Discharge Assessment   Assessment Type Discharge Planning Assessment   Confirmed/corrected address and phone number on facesheet? Yes   Assessment information obtained from? Patient;Medical Record   Expected Length of Stay (days) 1   Communicated expected length of stay with patient/caregiver yes   Prior to hospitilization cognitive status: Alert/Oriented   Prior to hospitalization functional status: Independent   Current cognitive status: Alert/Oriented   Current Functional Status: Independent   Facility Arrived From: home   Lives With spouse   Able to Return to Prior Arrangements yes   Is patient able to care for self after discharge? Yes   Who are your caregiver(s) and their phone number(s)? Sukhdeep, Spouse [phone]   Patient's perception of discharge disposition admitted as an inpatient   Readmission Within the Last 30 Days no previous admission in last 30 days   Patient currently being followed by outpatient case management? No   Patient currently receives any other outside agency services? No   Equipment Currently Used at Home none   Do you have any problems affording any of your prescribed medications? No   Is the patient taking medications as prescribed? yes   Does the patient have transportation home? Yes   Transportation Anticipated family or friend will provide   Dialysis Name and Scheduled days N/A   Does the patient receive services at the Coumadin Clinic? No   Discharge Plan A Home with family   Discharge Plan B Home Health   DME Needed Upon Discharge  none   Patient/Family in Agreement with Plan yes   SW to pt's room to complete initial discharge planning assessment, discuss helping the pt manage care at home, and identifying health care preferences.     Pt identified using their name and date of birth as the two identifiers.    SW introduced self and the care management role in discharge planning was explained to the pt.    SW's name and contact info placed on the pts white board in  their room.    The pt identified their spouse, Sukhdeep as a person who will help at home if needed.     The pt identified their preferred pharmacy as:   [facility] [address]  [address]  [place] [address]  Phone: [phone] Fax: [phone]    During discharge planning, the SW will make efforts to schedule appointments using the pts preferred appointment time: Any time, no preference    Pt does not currently have a PCP but would like to set up an [facility] PCP, SW will arrange.     Case Management SW or TN will continue to plan for pts discharge throughout their inpatient stay.

## 2019-04-18 NOTE — ASSESSMENT & PLAN NOTE
Presented with mixed dark and bright red blood per rectum  Hgb 6.8 on admit  Transfused 2U RBC on 4/18 with appropriate response  Monitor CBC

## 2019-04-18 NOTE — PROGRESS NOTES
[facility] Medicine  Progress Note    Patient Name: Alicia Chu  MRN: [id number]  Patient Class: IP- Inpatient   Admission Date: 4/17/2019  Length of Stay: 1 days  Attending Physician: Linda Solano MD  Primary Care Provider: Primary Doctor No        Subjective:     Principal Problem:Acute upper GI bleeding    HPI:  Alicia Chu is a 58 y.o. female that (in part)  has a past medical history of Colon polyps, Gastric bypass status for obesity, Hypertension, Postmenopausal HRT (hormone replacement therapy), and Ulcer.  has a past surgical history that includes Cholecystectomy; Gastric bypass; Hernia repair (x2); Knee arthroscopy w/ debridement; Infected skin debridement (x3); Breast biopsy (2005); and Abdominal surgery. Presents to [facility] Emergency Department With rectal bleeding.  Five day duration.  Having right bright red blood per rectum.  Foul-smelling.  Associated with abdominal cramping.  Symptoms began acutely on Saturday.  Describes stool as having coffee-grounds followed by bright red blood.  Noticed associated fatigue, lightheadedness, shortness of breath, dyspnea with exertion, and nausea.  History of bleeding ulcers from 6 years ago.  She had a colonoscopy 2 years ago.  Complains of gastric reflux symptoms.  Denies heavy use of NSAIDs or alcohol.  No recent visits with bariatric clinic.      In the emergency department routine laboratory studies were obtained which revealed a hemoglobin of 6.3 and an MCV of 107.  The patient recently started taking a multivitamin, but has not taken B12 or checked her iron level and many years she states.  2 units of packed red blood cells were typed and cross-matched for transfusion.  On physical exam external hemorrhoid and guaiac-positive stools were identified. GI was consulted for suspected upper GI bleed possibly secondary gastric or peptic ulcer.  She was given a Protonix infusion.    [facility] medicine  has been asked to admit for further evaluation and treatment.       Hospital Course:  Admitted with concerns for upper GI bleed causing acute blood loss anemia.  She was transfused 2 units of blood for hemoglobin 6.3 with appropriate response to 8.1.  She has not had no further bowel movements.  GI consult today.    Interval History:  Mild epigastric tenderness. No other complaints. No BMs since admit, so no further blood visualized     Review of Systems   Constitutional: Negative for chills and fever.   Respiratory: Negative for cough, shortness of breath and wheezing.    Cardiovascular: Negative for chest pain, palpitations and leg swelling.   Gastrointestinal: Positive for abdominal pain. Negative for abdominal distention, constipation, diarrhea, nausea and vomiting.   Genitourinary: Negative for difficulty urinating and dysuria.   Musculoskeletal: Negative for arthralgias and myalgias.   Skin: Positive for pallor. Negative for rash and wound.   Neurological: Negative for dizziness, light-headedness and headaches.     Objective:     Vital Signs (Most Recent):  Temp: 99.3 °F (37.4 °C) (04/18/19 0734)  Pulse: 89 (04/18/19 0734)  Resp: 17 (04/18/19 0734)  BP: 114/71 (04/18/19 0734)  SpO2: 98 % (04/18/19 0734) Vital Signs (24h Range):  Temp:  [98 °F (36.7 °C)-99.7 °F (37.6 °C)] 99.3 °F (37.4 °C)  Pulse:  [] 89  Resp:  [16-23] 17  SpO2:  [96 %-100 %] 98 %  BP: (104-128)/(61-77) 114/71     Weight: 72.8 kg (160 lb 7.9 oz)  Body mass index is 25.9 kg/m².    Intake/Output Summary (Last 24 hours) at 4/18/2019 1130  Last data filed at 4/18/2019 0800  Gross per 24 hour   Intake 1011.33 ml   Output 700 ml   Net 311.33 ml      Physical Exam   Constitutional: She appears well-developed and well-nourished. No distress.   HENT:   Head: Normocephalic and atraumatic.   Nose: Nose normal.   Mouth/Throat: Oropharynx is clear and moist.   Eyes: EOM are normal. No scleral icterus.   Conjunctival pallor   Neck: Neck supple.    Cardiovascular: Normal rate, regular rhythm, normal heart sounds and intact distal pulses. Exam reveals no gallop and no friction rub.   No murmur heard.  Pulmonary/Chest: Effort normal and breath sounds normal. No stridor. No respiratory distress. She has no wheezes. She has no rales.   Abdominal: Soft. Bowel sounds are normal. She exhibits no distension and no mass. There is no tenderness. There is no guarding.   Musculoskeletal: She exhibits no edema.   Neurological: She is alert.   Skin: Skin is warm and dry. No rash noted. She is not diaphoretic. No erythema. There is pallor.   Nursing note and vitals reviewed.      Significant Labs: All pertinent labs within the past 24 hours have been reviewed.    Significant Imaging: I have reviewed and interpreted all pertinent imaging results/findings within the past 24 hours.    Assessment/Plan:      * Acute upper GI bleeding  Presented with mixed dark blood and BRBPR  Transfused 2U RBC on 4/18 for Hgb 6.8 with appropriate response  No further bleeding noted per patient  Trend Hgb  Continue PPI gtt  GI consulted- EGD today      Iron deficiency anemia  Due to bleeding  TSAT 11%- will need to start iron supplements after EGD      History of Keyshawn-en-Y gastric bypass  See gastric bypass  No acute issues      Acute post-hemorrhagic anemia  Presented with mixed dark and bright red blood per rectum  Hgb 6.8 on admit  Transfused 2U RBC on 4/18 with appropriate response  Monitor CBC      Hypertension  BP well controlled  No meds on home list- will need to follow up with patient     Gastric bypass status for obesity  No acute issues        VTE Risk Mitigation (From admission, onward)        Ordered     IP VTE LOW RISK PATIENT  Once      04/18/19 0001     Place sequential compression device  Until discontinued      04/18/19 0001        3:38 PM  Saw patient after EGD. She is stable and eating lunch. She has no complaints. She understands EGD findings. Discussed plan for PPI BID x2  months then daily and sucralfate 1g QID x 2 weeks. Plan for repeat CBC tonight and tomorrow and DC tomorrow if stable. All questions answered.     Note there is a KUB in the chart that says it was of this patient. This was erroneously put in the wrong epic chart. This patient has not had a XR. Discussed with Radiology who will fix this in Epic.       Linda Solano MD  Department of Hospital Medicine   [facility]

## 2019-04-18 NOTE — ANESTHESIA PREPROCEDURE EVALUATION
04/18/2019  Alicia Chu is a 58 y.o., female.    Anesthesia Evaluation    I have reviewed the Patient Summary Reports.    I have reviewed the Nursing Notes.   I have reviewed the Medications.     Review of Systems  Anesthesia Hx:  No problems with previous Anesthesia Denies Hx of Anesthetic complications  Neg history of prior surgery. Denies Family Hx of Anesthesia complications.   Denies Personal Hx of Anesthesia complications.   Social:  Non-Smoker, No Alcohol Use    Hematology/Oncology:  Hematology Normal   Oncology Normal     EENT/Dental:EENT/Dental Normal   Cardiovascular:   Exercise tolerance: good Hypertension    Pulmonary:  Pulmonary Normal    Renal/:  Renal/ Normal     Hepatic/GI:   PUD, GERD    Musculoskeletal:  Musculoskeletal Normal    Neurological:  Neurology Normal    Endocrine:  Endocrine Normal    Dermatological:  Skin Normal    Psych:  Psychiatric Normal           Physical Exam  General:  Well nourished    Airway/Jaw/Neck:  Airway Findings: Mouth Opening: Normal General Airway Assessment: Adult  Mallampati: II  TM Distance: Normal, at least 6 cm         Dental:  DENTAL FINDINGS: Normal   Chest/Lungs:  Chest/Lungs Clear    Heart/Vascular:  Heart Findings: Normal Heart murmur: negative       Mental Status:  Mental Status Findings:  Cooperative, Alert and Oriented         Anesthesia Plan  Type of Anesthesia, risks & benefits discussed:  Anesthesia Type:  general  Patient's Preference:   Intra-op Monitoring Plan:   Intra-op Monitoring Plan Comments:   Post Op Pain Control Plan:   Post Op Pain Control Plan Comments:   Induction:   IV  Beta Blocker:  Patient is not currently on a Beta-Blocker (No further documentation required).       Informed Consent: Patient understands risks and agrees with Anesthesia plan.  Questions answered. Anesthesia consent signed with patient.  ASA Score: 2     Day  of Surgery Review of History & Physical:            Ready For Surgery From Anesthesia Perspective.

## 2019-04-18 NOTE — [employer] PATIENT INSTRUCTIONS
Discharge Summary/Instructions after an Endoscopic Procedure  Patient Name: Alicia Chu  Patient MRN: [id number]  Patient YOB: 1960 Thursday, April 18, 2019  Mony Plunkett MD  RESTRICTIONS:  During your procedure today, you received medications for sedation.  These   medications may affect your judgment, balance and coordination.  Therefore,   for 24 hours, you have the following restrictions:   - DO NOT drive a car, operate machinery, make legal/financial decisions,   sign important papers or drink alcohol.    ACTIVITY:  Today: no heavy lifting, straining or running due to procedural   sedation/anesthesia.  The following day: return to full activity including work.  DIET:  Eat and drink normally unless instructed otherwise.     TREATMENT FOR COMMON SIDE EFFECTS:  - Mild abdominal pain, nausea, belching, bloating or excessive gas:  rest,   eat lightly and use a heating pad.  - Sore Throat: treat with throat lozenges and/or gargle with warm salt   water.  - Because air was used during the procedure, expelling large amounts of air   from your rectum or belching is normal.  - If a bowel prep was taken, you may not have a bowel movement for 1-3 days.    This is normal.  SYMPTOMS TO WATCH FOR AND REPORT TO YOUR PHYSICIAN:  1. Abdominal pain or bloating, other than gas cramps.  2. Chest pain.  3. Back pain.  4. Signs of infection such as: chills or fever occurring within 24 hours   after the procedure.  5. Rectal bleeding, which would show as bright red, maroon, or black stools.   (A tablespoon of blood from the rectum is not serious, especially if   hemorrhoids are present.)  6. Vomiting.  7. Weakness or dizziness.  GO DIRECTLY TO THE NEAREST EMERGENCY ROOM IF YOU HAVE ANY OF THE FOLLOWING:      Difficulty breathing              Chills and/or fever over 101 F   Persistent vomiting and/or vomiting blood   Severe abdominal pain   Severe chest pain   Black, tarry stools   Bleeding- more than one tablespoon   Any  other symptom or condition that you feel may need urgent attention  Your doctor recommends these additional instructions:  If any biopsies were taken, your doctors clinic will contact you in 1 to 2   weeks with any results.  - Return patient to [facility] for ongoing care.   - Resume previous diet today.   - Continue present medications.   - Use Protonix (pantoprazole) 40 mg PO BID for 2 months, then daily.  - Use sucralfate suspension 1 gram PO QID for 2 weeks.   - No ibuprofen, naproxen, or other non-steroidal anti-inflammatory drugs.   - Can give IV iron to replenish iron stores (won't absorb PO iron due to   bypassed duodenum).  - Will arrange for follow up in GI clinic in 1 month.  May need to see   bariatric surgery again if symptoms are not improving with medical   management to consider revision.  - Outpatient colonoscopy for surveillance (history of polyps, family history   of colon cancer).  - The findings and recommendations were discussed with the patient.  For questions, problems or results please call your physician - Mony Plunkett MD at Work:  [phone].  [facility] can be reached at [phone]     IF A COMPLICATION OR EMERGENCY SITUATION ARISES AND YOU ARE UNABLE TO REACH   YOUR PHYSICIAN - GO DIRECTLY TO THE EMERGENCY ROOM.  Mony Plunkett MD  4/18/2019 12:42:12 PM  This report has been verified and signed electronically.  [employer]

## 2019-04-18 NOTE — CONSULTS
Gastroenterology Consult    4/18/2019 10:11 AM    Patient Name: Alicia Chu  MRN: [id number]  Admission Date: 4/17/2019  Hospital Length of Stay: 1 days  Code Status: Full Code   Primary Care Physician: Primary Doctor No  Principal Problem:Acute upper GI bleeding  Consulting Physician: Inpatient consult to Gastroenterology  Consult performed by: Mony Plunkett MD  Consult ordered by: Marcelo Mcgovern MD      Reason for consultation: GI bleed    HPI:  Alicia Chu is a 58 y.o. female with a history of HTN, PUD, and Zackary en Y gastric bypass who presented with complaints of dark maroon and black foul smelling stool (since Friday) followed by BRBPR.  She also felt fatigue, lightheadedness, shortness of breath and nausea, but most of this has now improved except for the nausea.  She is not using any NSAIDs.  She has epigastric abdominal discomfort without radiation that has been ongoing for the last 2 years.  She reports PUD 6 years ago (ulceration of gastrojejunal anastomosis).  Her last colonoscopy was in 2014, with 1 polyp; no mention of diverticulosis.  She reports initial weight loss of 105 lbs with her gastric bypass. She then regained some.  She has had unintentional weight loss over the last 6 months.    Past Medical History:   Diagnosis Date    Colon polyps     Gastric bypass status for obesity 2006    Hypertension     Postmenopausal HRT (hormone replacement therapy)     Ulcer        Past Surgical History:   Procedure Laterality Date    ABDOMINAL SURGERY      BREAST BIOPSY  2005    right    CHOLECYSTECTOMY      COLONOSCOPY N/A 3/21/2014    [facility] by [facility]illa [facility] ([facility])    EGD (ESOPHAGOGASTRODUODENOSCOPY) N/A 9/27/2013    [facility]    GASTRIC BYPASS      zackary en Y    HERNIA REPAIR  x2    LI hernia    INFECTED SKIN DEBRIDEMENT  x3    KNEE ARTHROSCOPY W/ DEBRIDEMENT      right       Social History     Socioeconomic History     "Marital status:      Spouse name: Not on file    Number of children: Not on file    Years of education: Not on file    Highest education level: Not on file   Occupational History    Occupation: work at [employer]     Employer: [email or web address]   Social Needs    Financial resource strain: Not on file    Food insecurity:     Worry: Not on file     Inability: Not on file    Transportation needs:     Medical: Not on file     Non-medical: Not on file   Tobacco Use    Smoking status: Never Smoker    Smokeless tobacco: Never Used   Substance and Sexual Activity    Alcohol use: Yes     Alcohol/week: 0.0 oz     Comment: occasional    Drug use: No    Sexual activity: Yes     Partners: Male     Birth control/protection: None   Lifestyle    Physical activity:     Days per week: Not on file     Minutes per session: Not on file    Stress: Not on file   Relationships    Social connections:     Talks on phone: Not on file     Gets together: Not on file     Attends Islam service: Not on file     Active member of club or organization: Not on file     Attends meetings of clubs or organizations: Not on file     Relationship status: Not on file   Other Topics Concern    Not on file   Social History Narrative    Not on file       Family History   Problem Relation Age of Onset    Cancer Mother         colon    Dementia Father     Hypertension Father     Hyperlipidemia Father     Alzheimer's disease Father 78    Cancer Paternal Grandfather         colon cancer    Breast cancer Maternal Uncle          Review of patient's allergies indicates:   Allergen Reactions    Aspirin      Due to gastric bypass    Ibuprofen      Other reaction(s): Due To Gastric Bypass  Due to gastric bypass    Inderal [propranolol]      "Felt bizarre"         Current Facility-Administered Medications:     0.9%  NaCl infusion (for blood administration), , Intravenous, Q24H PRN, Marcelo Mcgovern MD    acetaminophen tablet 650 mg, 650 mg, " "Oral, Q8H PRN, Marcelo Mcgovern MD, 650 mg at 04/17/19 2324    ondansetron injection 4 mg, 4 mg, Intravenous, Q8H PRN, Marcelo Mcgovern MD    pantoprazole 40 mg in dextrose 5 % 100 mL infusion (ready to mix system), 8 mg/hr, Intravenous, Continuous, Marcelo Mcgovern MD, Last Rate: 20 mL/hr at 04/18/19 0947, 8 mg/hr at 04/18/19 0947    sodium chloride 0.9% flush 10 mL, 10 mL, Intravenous, PRN, Marcelo Mcgovern MD    Review of Systems   Constitutional: Positive for malaise/fatigue. Negative for chills, fever and weight loss.   HENT: Negative.    Eyes: Negative.    Respiratory: Positive for shortness of breath.    Cardiovascular: Negative.    Gastrointestinal: Positive for abdominal pain, blood in stool, melena and nausea. Negative for constipation, diarrhea, heartburn and vomiting.   Genitourinary: Negative.    Musculoskeletal: Negative.    Skin: Negative.    Neurological: Positive for weakness.   Endo/Heme/Allergies: Negative.        /71 (BP Location: Left arm, Patient Position: Lying)   Pulse 89   Temp 99.3 °F (37.4 °C) (Oral)   Resp 17   Ht 5' 6" (1.676 m)   Wt 72.8 kg (160 lb 7.9 oz)   LMP 12/24/2012   SpO2 98%   Breastfeeding? No   BMI 25.90 kg/m²   Physical Exam   Constitutional: She is oriented to person, place, and time. She appears well-developed and well-nourished. No distress.   HENT:   Head: Normocephalic and atraumatic.   Mouth/Throat: Oropharynx is clear and moist.   Eyes: Pupils are equal, round, and reactive to light. EOM are normal. No scleral icterus.   Cardiovascular: Normal rate and regular rhythm.   No murmur heard.  Pulmonary/Chest: Effort normal and breath sounds normal. She has no wheezes.   Abdominal: Soft. Bowel sounds are normal. She exhibits no distension. There is tenderness (epigastric). There is no guarding.   Musculoskeletal: Normal range of motion. She exhibits no edema.   Neurological: She is alert and oriented to person, place, and time. No sensory " deficit.   Skin: Skin is warm and dry. No rash noted.   Vitals reviewed.      Labs:  Lab Results   Component Value Date/Time    WBC 4.22 04/18/2019 06:07 AM    HGB 8.1 (L) 04/18/2019 06:07 AM    HCT 26.7 (L) 04/18/2019 06:07 AM     (L) 04/18/2019 06:07 AM    MCV 97 04/18/2019 06:07 AM     04/18/2019 06:07 AM    K 3.4 (L) 04/18/2019 06:07 AM     04/18/2019 06:07 AM    CO2 24 04/18/2019 06:07 AM    BUN 11 04/18/2019 06:07 AM    CREATININE 0.8 04/18/2019 06:07 AM     04/18/2019 06:07 AM    CALCIUM 8.3 (L) 04/18/2019 06:07 AM    INR 0.9 04/17/2019 04:55 PM    APTT 23.5 04/17/2019 04:55 PM   ]  Lab Results   Component Value Date/Time    PROT 5.4 (L) 04/17/2019 04:55 PM    ALBUMIN 2.1 (L) 04/17/2019 04:55 PM    BILITOT 0.6 04/17/2019 04:55 PM    AST 57 (H) 04/17/2019 04:55 PM    ALT 22 04/17/2019 04:55 PM    ALKPHOS 225 (H) 04/17/2019 04:55 PM   ]    Imaging and Procedures:  I personally reviewed the imaging/procedures below.  AXR 4/18/2019:  1. Moderate to large amount of retained colonic stool.  Clinical correlation for constipation suggested.  2. Questionable findings of a nondisplaced fracture of the right greater trochanter.  See recommendations above.  3. Severe osteoarthritic changes of the right hip joint associated with protrusio acetabuli as above.    Assessment:  Alicia Chu is a 58 y.o. female with a history of HTN, PUD, and gastric bypass who presented with complaints of dark maroon/black foul smelling stool followed by BRBPR.    Plan:  - NPO  - PPI gtt  - EGD today to assess for anastomotic ulcer or other source of UGI bleeding  - large stool burden on AXR - would start miralax 17g BID and dulcolax 10 mg PO daily for the next few days to help clean out stool as this could be contributing to her pain and nausea  - she is due for colonoscopy for surveillance (family history CRC and personal history of polyps) - this can be done outpatient  - transfusions per primary team as needed  (goal hemoglobin ~7-8 or higher if symptomatic)    Mony Plunkett MD

## 2019-04-18 NOTE — ASSESSMENT & PLAN NOTE
Presented with mixed dark blood and BRBPR  Transfused 2U RBC on 4/18 for Hgb 6.8 with appropriate response  No further bleeding noted per patient  Trend Hgb  Continue PPI gtt  GI consulted- EGD today

## 2019-04-18 NOTE — PROVIDER TRANSFER
Ms Alicia Chu is a 58 y.o. woman with history of gastric bypass who came in with melena and hematochezia and symptomatic anemia. Hgb 6.3 on admit, transfused 2U RBCs on 4/18 with appropriate response. EGD 4/18 showed ulcer at anastamosis. Plan for PPI and sucralfate.     To do  - PPI and sucralfate already prescribed  - stable for discharge to home tomorrow with GI follow up if Hgb is stable    Linda Solano MD  04/18/2019 5:01 PM

## 2019-04-18 NOTE — TRANSFER OF CARE
"Anesthesia Transfer of Care Note    Patient: Alicia Chu    Procedure(s) Performed: Procedure(s) (LRB):  EGD (ESOPHAGOGASTRODUODENOSCOPY) (N/A)    Patient location: GI    Anesthesia Type: general    Transport from OR: Transported from OR on room air with adequate spontaneous ventilation    Post pain: adequate analgesia    Post assessment: no apparent anesthetic complications and tolerated procedure well    Post vital signs: stable    Level of consciousness: awake, alert and oriented    Nausea/Vomiting: no nausea/vomiting    Complications: none    Transfer of care protocol was followed      Last vitals:   Visit Vitals  Blood Pressure (Abnormal) 150/97 (BP Location: Right arm, Patient Position: Lying)   Pulse 94   Temperature 36.4 °C (97.5 °F) (Oral)   Respiration 16   Height 5' 6" (1.676 m)   Weight 72.8 kg (160 lb 7.9 oz)   Last Menstrual Period 12/24/2012   Oxygen Saturation 98%   Breastfeeding? No   Body Mass Index 25.90 kg/m²     "

## 2019-04-18 NOTE — NURSING
Note that patient up to the BR with one-person assistance;  C/O dizziness when ambulating;     Urinated approximately 300 cc yellow urine;     C/o 5/10 pain to abdomen; Given PRN acetaminophen;     Will continue to f/u;

## 2019-04-18 NOTE — PLAN OF CARE
Problem: Fall Injury Risk  Goal: Absence of Fall and Fall-Related Injury    Intervention: Identify and Manage Contributors to Fall Injury Risk     04/18/19 1340   Manage Acute Allergic Reaction   Medication Review/Management medications reviewed;high risk medications identified     Intervention: Promote Injury-Free Environment     04/18/19 1340   Optimize [place] and Functional Mobility   Environmental Safety Modification assistive device/personal items within reach;clutter free environment maintained;room near unit station   Optimize Balance and Safe Activity   Safety Promotion/Fall Prevention Fall Risk reviewed with patient/family;medications reviewed;room near unit station

## 2019-04-18 NOTE — NURSING
AAO X4. Admits to a fall 2 weeks ago, out of recliner chair. Fall band applied along with yellow non-skid socks. No c/o pian at this time. Blood transfusion and Protonix drip infusing. Call light at side.

## 2019-04-18 NOTE — H&P
[facility] Hospital Medicine  History & Physical    Patient Name: Alicia Chu  MRN: [id number]  Admission Date: 04/18/2019  Attending Physician: Marcelo Mcgovern MD, MPH      PCP:     Primary Doctor No    CC:     Chief Complaint   Patient presents with    Rectal Bleeding     Pt reports 5 day hx of dark stool and bright red bleeding from rectum. Pt states it is the most foul smelling stool she has ever had.        HISTORY OF PRESENT ILLNESS:     Alicia Chu is a 58 y.o. female that (in part)  has a past medical history of Colon polyps, Gastric bypass status for obesity, Hypertension, Postmenopausal HRT (hormone replacement therapy), and Ulcer.  has a past surgical history that includes Cholecystectomy; Gastric bypass; Hernia repair (x2); Knee arthroscopy w/ debridement; Infected skin debridement (x3); Breast biopsy (2005); and Abdominal surgery. Presents to [facility] Emergency Department With rectal bleeding.  Five day duration.  Having right bright red blood per rectum.  Foul-smelling.  Associated with abdominal cramping.  Symptoms began acutely on Saturday.  Describes stool as having coffee-grounds followed by bright red blood.  Noticed associated fatigue, lightheadedness, shortness of breath, dyspnea with exertion, and nausea.  History of bleeding ulcers from 6 years ago.  She had a colonoscopy 2 years ago.  Complains of gastric reflux symptoms.  Denies heavy use of NSAIDs or alcohol.  No recent visits with bariatric clinic.      In the emergency department routine laboratory studies were obtained which revealed a hemoglobin of 6.3 and an MCV of 107.  The patient recently started taking a multivitamin, but has not taken B12 or checked her iron level and many years she states.  2 units of packed red blood cells were typed and cross-matched for transfusion.  On physical exam external hemorrhoid and guaiac-positive stools were identified. GI was consulted for  suspected upper GI bleed possibly secondary gastric or peptic ulcer.  She was given a Protonix infusion.    Hospital medicine has been asked to admit for further evaluation and treatment.       REVIEW OF SYSTEMS:     -- Constitutional:  Generalized weakness and fatigue.  No fever or chills.  -- Eyes: No visual changes, diplopia, pain, tearing, blind spots, or discharge.   -- Ears, nose, mouth, throat, and face: No congestion, sore throat, epistaxis, d/c, bleeding gums, neck stiffness masses, or dental issues.  -- Respiratory:  Shortness of breath at rest.  No cough, hemoptysis, stridor, wheezing, or night sweats.  -- Cardiovascular:  Dyspnea with exertion.  No chest pain,  syncope, PND, edema, cyanosis, or palpitations.   -- Gastrointestinal:  GI bleed as noted above in the HPI.  -- Genitourinary: No hematuria, dysuria, frequency, urgency, nocturia, polyuria, stones, or incontinence.  -- Integument/breast: No rash, pruritis, pigmentation changes, dryness, or changes in hair  -- Hematologic/lymphatic: No easy bruising or lymphadenopathy.   -- Musculoskeletal: No acute arthralgias, acute myalgias, joint swelling, acute limitations of ROM.  Positive for generalized subacute nonfocal muscle weakness.  -- Neurological: No seizures, headaches, incoordination, paraesthesias, ataxia, vertigo, or tremors.  -- Behavioral/Psych: No auditory or visual hallucinations, depression, or suicidal/homicidal ideations.  -- Endocrine: No heat or cold intolerance, polydipsia, or unintentional weight gain / loss.  -- Allergy/Immunologic: No recurrent infections or adverse reaction to food, insects, or difficulty breathing.      PAST MEDICAL / SURGICAL HISTORY:     Past Medical History:   Diagnosis Date    Colon polyps     Gastric bypass status for obesity 2006    Hypertension     Postmenopausal HRT (hormone replacement therapy)     Ulcer      Past Surgical History:   Procedure Laterality Date    ABDOMINAL SURGERY      BREAST BIOPSY   2005    right    CHOLECYSTECTOMY      COLONOSCOPY N/A 3/21/2014    Performed by [facility]illa [facility] ([facility]TH F[facility])    EGD (ESOPHAGOGASTRODUODENOSCOPY) N/A 9/27/2013    [facility]de [facility] at [facility]O    GASTRIC BYPASS      zackary en Y    HERNIA REPAIR  x2    LI hernia    INFECTED SKIN DEBRIDEMENT  x3    KNEE ARTHROSCOPY W/ DEBRIDEMENT      right         FAMILY HISTORY:     Family History   Problem Relation Age of Onset    Cancer Mother         colon    Dementia Father     Hypertension Father     Hyperlipidemia Father     Alzheimer's disease Father 78    Cancer Paternal Grandfather         colon cancer    Breast cancer Maternal Uncle          SOCIAL HISTORY:     Social History     Socioeconomic History    Marital status:      Spouse name: Not on file    Number of children: Not on file    Years of education: Not on file    Highest education level: Not on file   Occupational History    Occupation: work at [employer]     Employer: [employer]   Social Needs    Financial resource strain: Not on file    Food insecurity:     Worry: Not on file     Inability: Not on file    Transportation needs:     Medical: Not on file     Non-medical: Not on file   Tobacco Use    Smoking status: Never Smoker    Smokeless tobacco: Never Used   Substance and Sexual Activity    Alcohol use: Yes     Alcohol/week: 0.0 oz     Comment: occasional    Drug use: No    Sexual activity: Yes     Partners: Male     Birth control/protection: None   Lifestyle    Physical activity:     Days per week: Not on file     Minutes per session: Not on file    Stress: Not on file   Relationships    Social connections:     Talks on phone: Not on file     Gets together: Not on file     Attends Yarsanism service: Not on file     Active member of club or organization: Not on file     Attends meetings of clubs or organizations: Not on file     Relationship status: Not on file   Other Topics Concern    Not on file  "  Social History Narrative    Not on file         ALLERGIES:       Review of patient's allergies indicates:   Allergen Reactions    Aspirin      Due to gastric bypass    Ibuprofen      Other reaction(s): Due To Gastric Bypass  Due to gastric bypass    Inderal [propranolol]      "Felt bizarre"           HOME MEDICATIONS:     Prior to Admission medications    Not on File      Does not take home medications except a multivitamin.    HOSPITAL MEDICATIONS:     Scheduled Meds:   Continuous Infusions:    pantoprazole 40 mg in dextrose 5 % 100 mL infusion (ready to mix system) 8 mg/hr (04/18/19 0328)     PRN Meds: sodium chloride, acetaminophen, morphine, ondansetron, sodium chloride 0.9%      PHYSICAL EXAM:     Wt Readings from Last 1 Encounters:   04/18/19 0002 72.8 kg (160 lb 7.9 oz)   04/17/19 2352 72.8 kg (160 lb 7.9 oz)   04/17/19 2100 70.8 kg (156 lb)   04/17/19 1630 70.8 kg (156 lb)     Body mass index is 25.9 kg/m².  Vitals:    04/18/19 0045 04/18/19 0115 04/18/19 0124 04/18/19 0140   BP: 117/68 125/71 117/68 (P) 123/74   Patient Position:       Pulse: 90 84 85 (P) 84   Resp: 16 16 18 (P) 18   Temp: 98.8 °F (37.1 °C) 98.8 °F (37.1 °C) 98.7 °F (37.1 °C)    TempSrc: Oral Oral Oral (P) Oral   SpO2: 98% 97% 99% (P) 99%   Weight:       Height:              -- General appearance: well developed. appears stated age   -- Head: normocephalic, atraumatic   -- Eyes:  Pale conjunctivae that are otherwise clear. Extraocular muscles intact  -- Nose: Nares normal. Septum midline.   -- Mouth/Throat: l pale oral mucosa.. no throat erythema.   -- Neck: supple, symmetrical, trachea midline, no JVD and thyroid not grossly enlarged, appears symmetric  -- Lungs: clear to auscultation bilaterally. normal respiratory effort. No use of accessory muscles.   -- Chest wall: no tenderness. equal bilateral chest rise   -- Heart: regular rate and regular rhythm. S1, S2 normal.  no click, rub or gallop   -- Abdomen:  Mild epigastric " tenderness. No rebound or guarding.  soft, non-distended, non-tympanic; bowel sounds normal; no masses  -- Extremities: no cyanosis, clubbing or edema.   -- Pulses: 2+ and symmetric   -- Skin:  Skin pallor.  texture normal, turgor normal. No rashes or lesions.   -- Neurologic: Normal strength and tone. No focal numbness or weakness. CNII-XII intact. [place] coma scale: eyes open spontaneously-4, oriented & converses-5, obeys commands-6.      LABORATORY STUDIES:     Recent Results (from the past 36 hour(s))   CBC auto differential    Collection Time: 04/17/19  4:55 PM   Result Value Ref Range    WBC 6.39 3.90 - 12.70 K/uL    RBC 1.88 (L) 4.00 - 5.40 M/uL    Hemoglobin 6.3 (L) 12.0 - 16.0 g/dL    Hematocrit 20.2 (L) 37.0 - 48.5 %     (H) 82 - 98 fL    MCH 33.5 (H) 27.0 - 31.0 pg    MCHC 31.2 (L) 32.0 - 36.0 g/dL    RDW 22.1 (H) 11.5 - 14.5 %    Platelets 191 150 - 350 K/uL    MPV 9.4 9.2 - 12.9 fL    Gran # (ANC) 4.4 1.8 - 7.7 K/uL    Lymph # 1.3 1.0 - 4.8 K/uL    Mono # 0.6 0.3 - 1.0 K/uL    Eos # 0.0 0.0 - 0.5 K/uL    Baso # 0.03 0.00 - 0.20 K/uL    Gran% 70.4 38.0 - 73.0 %    Lymph% 20.0 18.0 - 48.0 %    Mono% 9.7 4.0 - 15.0 %    Eosinophil% 0.3 0.0 - 8.0 %    Basophil% 0.5 0.0 - 1.9 %    Platelet Estimate Appears normal     Aniso Slight     Poly Moderate     Differential Method Automated    Comprehensive metabolic panel    Collection Time: 04/17/19  4:55 PM   Result Value Ref Range    Sodium 139 136 - 145 mmol/L    Potassium 3.5 3.5 - 5.1 mmol/L    Chloride 105 95 - 110 mmol/L    CO2 22 (L) 23 - 29 mmol/L    Glucose 118 (H) 70 - 110 mg/dL    BUN, Bld 14 6 - 20 mg/dL    Creatinine 0.8 0.5 - 1.4 mg/dL    Calcium 8.5 (L) 8.7 - 10.5 mg/dL    Total Protein 5.4 (L) 6.0 - 8.4 g/dL    Albumin 2.1 (L) 3.5 - 5.2 g/dL    Total Bilirubin 0.6 0.1 - 1.0 mg/dL    Alkaline Phosphatase 225 (H) 55 - 135 U/L    AST 57 (H) 10 - 40 U/L    ALT 22 10 - 44 U/L    Anion Gap 12 8 - 16 mmol/L    eGFR if African American >60 >60  mL/min/1.73 m^2    eGFR if non African American >60 >60 mL/min/1.73 m^2   Lipase    Collection Time: 04/17/19  4:55 PM   Result Value Ref Range    Lipase 18 4 - 60 U/L   Protime-INR    Collection Time: 04/17/19  4:55 PM   Result Value Ref Range    Prothrombin Time 9.8 9.0 - 12.5 sec    INR 0.9 0.8 - 1.2   APTT    Collection Time: 04/17/19  4:55 PM   Result Value Ref Range    aPTT 23.5 21.0 - 32.0 sec   Type & Screen    Collection Time: 04/17/19  4:55 PM   Result Value Ref Range    Group & Rh O POS     Indirect Narinder NEG    Iron and TIBC    Collection Time: 04/17/19  4:55 PM   Result Value Ref Range    Iron 21 (L) 30 - 160 ug/dL    Transferrin 126 (L) 200 - 375 mg/dL    TIBC 186 (L) 250 - 450 ug/dL    Saturated Iron 11 (L) 20 - 50 %   Prepare RBC 2 Units; symptomatic anemia, GI bleed    Collection Time: 04/17/19  4:55 PM   Result Value Ref Range    UNIT NUMBER [id number]     PRODUCT CODE W4871D58     DISPENSE STATUS ISSUED     CODING SYSTEM XFVR852     Unit Blood Type Code 5100     Unit Blood Type O POS     Unit Expiration [id number]     UNIT NUMBER [id number]     PRODUCT CODE K3162M75     DISPENSE STATUS TRANSFUSED     CODING SYSTEM ARSX192     Unit Blood Type Code 5100     Unit Blood Type O POS     Unit Expiration [id number]        Lab Results   Component Value Date    INR 0.9 04/17/2019     Lab Results   Component Value Date    HGBA1C 5.2 12/20/2010     No results for input(s): POCTGLUCOSE in the last 72 hours.        MICROBIOLOGY DATA:     Urine Culture, Routine   Date Value Ref Range Status   07/17/2013   Final    MULTIPLE ORGANISMS ISOLATED. NONE IN PREDOMINANCE. REPEAT IF CLINICALLY NECESSARY.       Microbiology x 7d:   Microbiology Results (last 7 days)     ** No results found for the last 168 hours. **            IMAGING:     Abdominal x-ray-flat and erect-pending    CONSULTS:     IP CONSULT TO GI       ASSESSMENT & PLAN:     Primary Diagnosis:  Acute upper GI bleeding    Active Hospital Problems     Diagnosis  POA    *Acute upper GI bleeding [K92.2]  Yes     Priority: 1 - High    Iron deficiency anemia [D50.9]  Yes     Priority: 2     Acute post-hemorrhagic anemia [D62]  Yes     Priority: 2     History of Keyshawn-en-Y gastric bypass [Z98.84]  Not Applicable    Gastric bypass status for obesity [Z98.84]  Not Applicable    Hypertension [I10]  Yes      Resolved Hospital Problems   No resolved problems to display.           Acute Upper GI bleeding  · As evidence by physical exam and positive occult blood testing  · Possible etiology includes: hemorrhagic gastritis, ulcer (duodenal / gastric), gastric varices, malignancy, Heidy Garcia tear, colitis, colon cancer, internal hemorrhoids  · Monitor with serial H&H  · Give Protonix 80 mg IV bolus followed by 8 mg per hour IV drip  · Hold all anticoagulation medications  · N.p.o. except  Medications  · Provide IV fluids  · Obtain blood type and screen  · Maintain two large-bore IVs at all times  · Consult gastroenterology    Symptomatic Anemia due to blood loss and possible nutritional deficiency  ·  macrocytic anemia  · Iron & TIBC indicate this may be a mixed picture of iron deficiency and vitamin deficiency, given her history of gastric bypass  · Reticulocyte count  · LDH  · Haptoglobin  · Folate RBC  · Vitamin B1  · Vitamin B12  · Ferritin  · Peripheral Smear  · Hemoccult stool      Recent Labs     04/17/19  1655   IRON 21*   TIBC 186*       Essential Hypertension  · Goal while inpatient is a systolic blood pressure greater than 90 and less than 160mmHg  · BP in acceptable range at this time  ·  current home regimen on hold due to acute GI bleeding as noted above  · PRN antihypertensives available          VTE Risk Mitigation (From admission, onward)        Ordered     IP VTE LOW RISK PATIENT  Once      04/18/19 0001     Place sequential compression device  Until discontinued      04/18/19 0001            Adult PRN medications available   DVT prophylaxis given        DISPOSITION:     Will admit to the Hospital Medicine service for further evaluation and treatment.    Chart reviewed and updated where applicable.    High Risk Conditions:  Patient has a condition that poses threat to life and bodily function:  Acute upper GI bleeding with symptomatic anemia      ===============================================================    Marcelo Mcgovern MD, MPH  Department of Hospital Medicine   [facility]  [phone] pg  (7pm - 6am)          This note is dictated using G.ho.st voice recognition software.  There are word recognition mistakes that are occasionally missed on review.

## 2019-04-19 VITALS
HEART RATE: 78 BPM | TEMPERATURE: 98 F | OXYGEN SATURATION: 99 % | RESPIRATION RATE: 18 BRPM | HEIGHT: 66 IN | SYSTOLIC BLOOD PRESSURE: 117 MMHG | DIASTOLIC BLOOD PRESSURE: 73 MMHG | WEIGHT: 160.94 LBS | BODY MASS INDEX: 25.86 KG/M2

## 2019-04-19 LAB
ANION GAP SERPL CALC-SCNC: 8 MMOL/L (ref 8–16)
BASOPHILS # BLD AUTO: 0.01 K/UL (ref 0–0.2)
BASOPHILS NFR BLD: 0.2 % (ref 0–1.9)
BUN SERPL-MCNC: 9 MG/DL (ref 6–20)
CALCIUM SERPL-MCNC: 8.5 MG/DL (ref 8.7–10.5)
CHLORIDE SERPL-SCNC: 108 MMOL/L (ref 95–110)
CO2 SERPL-SCNC: 23 MMOL/L (ref 23–29)
CREAT SERPL-MCNC: 0.9 MG/DL (ref 0.5–1.4)
DIFFERENTIAL METHOD: ABNORMAL
EOSINOPHIL # BLD AUTO: 0 K/UL (ref 0–0.5)
EOSINOPHIL NFR BLD: 0.4 % (ref 0–8)
ERYTHROCYTE [DISTWIDTH] IN BLOOD BY AUTOMATED COUNT: 22.5 % (ref 11.5–14.5)
EST. GFR  (AFRICAN AMERICAN): >60 ML/MIN/1.73 M^2
EST. GFR  (NON AFRICAN AMERICAN): >60 ML/MIN/1.73 M^2
GLUCOSE SERPL-MCNC: 94 MG/DL (ref 70–110)
HCT VFR BLD AUTO: 30.1 % (ref 37–48.5)
HGB BLD-MCNC: 9.4 G/DL (ref 12–16)
LYMPHOCYTES # BLD AUTO: 0.6 K/UL (ref 1–4.8)
LYMPHOCYTES NFR BLD: 11.5 % (ref 18–48)
MCH RBC QN AUTO: 30.8 PG (ref 27–31)
MCHC RBC AUTO-ENTMCNC: 31.2 G/DL (ref 32–36)
MCV RBC AUTO: 99 FL (ref 82–98)
MONOCYTES # BLD AUTO: 0.6 K/UL (ref 0.3–1)
MONOCYTES NFR BLD: 12.5 % (ref 4–15)
NEUTROPHILS # BLD AUTO: 3.8 K/UL (ref 1.8–7.7)
NEUTROPHILS NFR BLD: 75.4 % (ref 38–73)
PLATELET # BLD AUTO: 155 K/UL (ref 150–350)
PMV BLD AUTO: 10 FL (ref 9.2–12.9)
POTASSIUM SERPL-SCNC: 4.8 MMOL/L (ref 3.5–5.1)
RBC # BLD AUTO: 3.05 M/UL (ref 4–5.4)
SODIUM SERPL-SCNC: 139 MMOL/L (ref 136–145)
WBC # BLD AUTO: 5.04 K/UL (ref 3.9–12.7)

## 2019-04-19 PROCEDURE — 36415 COLL VENOUS BLD VENIPUNCTURE: CPT

## 2019-04-19 PROCEDURE — 25000003 PHARM REV CODE 250: Performed by: INTERNAL MEDICINE

## 2019-04-19 PROCEDURE — 80048 BASIC METABOLIC PNL TOTAL CA: CPT

## 2019-04-19 PROCEDURE — 94761 N-INVAS EAR/PLS OXIMETRY MLT: CPT

## 2019-04-19 PROCEDURE — 85025 COMPLETE CBC W/AUTO DIFF WBC: CPT

## 2019-04-19 PROCEDURE — 25000003 PHARM REV CODE 250: Performed by: HOSPITALIST

## 2019-04-19 RX ORDER — PANTOPRAZOLE SODIUM 40 MG/1
40 TABLET, DELAYED RELEASE ORAL 2 TIMES DAILY
Qty: 28 TABLET | Refills: 0 | Status: SHIPPED | OUTPATIENT
Start: 2019-04-19 | End: 2019-05-03

## 2019-04-19 RX ORDER — SUCRALFATE 1 G/1
1 TABLET ORAL
Qty: 56 TABLET | Refills: 0 | Status: SHIPPED | OUTPATIENT
Start: 2019-04-19 | End: 2019-05-03

## 2019-04-19 RX ORDER — POLYETHYLENE GLYCOL 3350 17 G/17G
17 POWDER, FOR SOLUTION ORAL 2 TIMES DAILY
Refills: 0 | COMMUNITY
Start: 2019-04-19 | End: 2019-06-21 | Stop reason: ALTCHOICE

## 2019-04-19 RX ORDER — PANTOPRAZOLE SODIUM 40 MG/1
40 TABLET, DELAYED RELEASE ORAL DAILY
Qty: 30 TABLET | Refills: 11 | Status: SHIPPED | OUTPATIENT
Start: 2019-05-03 | End: 2020-07-01 | Stop reason: SDUPTHER

## 2019-04-19 RX ORDER — SUCRALFATE 1 G/1
1 TABLET ORAL
Qty: 240 TABLET | Refills: 0 | Status: SHIPPED | OUTPATIENT
Start: 2019-04-19 | End: 2019-04-19

## 2019-04-19 RX ADMIN — SUCRALFATE 1 G: 1 SUSPENSION ORAL at 12:04

## 2019-04-19 RX ADMIN — SUCRALFATE 1 G: 1 SUSPENSION ORAL at 01:04

## 2019-04-19 RX ADMIN — PANTOPRAZOLE SODIUM 40 MG: 40 TABLET, DELAYED RELEASE ORAL at 08:04

## 2019-04-19 RX ADMIN — SUCRALFATE 1 G: 1 SUSPENSION ORAL at 06:04

## 2019-04-19 RX ADMIN — BISACODYL 10 MG: 5 TABLET, COATED ORAL at 08:04

## 2019-04-19 RX ADMIN — ACETAMINOPHEN 650 MG: 325 TABLET, FILM COATED ORAL at 08:04

## 2019-04-19 RX ADMIN — POLYETHYLENE GLYCOL 3350 17 G: 17 POWDER, FOR SOLUTION ORAL at 08:04

## 2019-04-19 NOTE — PROGRESS NOTES
WRITTEN DISCHARGE INSTRUCTIONS    Follow-Up Appointments: We were unable to schedule a follow-up appointment for you with [facility]. Please call them to schedule a hospital follow-up at the number below:  Follow-up Information     [facility]All Locations. Schedule an appointment as soon as possible for a visit in 1 week.    Specialty:  Gastroenterology  Why:  Call to schedule a gastroenterology hospital follow-up for within 1 week.   Contact information:  [address] [place] [address]  [address]  [place] [address]  [phone]               Healthy Living Instructions to HELP YOU MANAGE YOUR CARE AT HOME:  Things that YOU are responsible for:  1. Getting your prescriptions filled  2. Taking your prescriptions as directed. DO NOT MISS ANY DOSES!  3. Following the diet and exercise recommended by your doctor  4. Going to your follow-up doctor appointments. This is important because it allows the doctor to monitor your progress and determine if any changes need to be made to your treatment plan.    If you have any questions MANAGING OUR CARE AT HOME, call [facility] for 24/7 assistance at [phone].

## 2019-04-19 NOTE — DISCHARGE SUMMARY
[facility] Hospital Medicine  Discharge Summary      Patient Name: Alicia Chu  MRN: [id number]  Admission Date: 4/17/2019  Hospital Length of Stay: 2 days  Discharge Date and Time:  04/19/2019 11:16 AM  Attending Physician: Leonila Alonzo MD   Discharging Provider: Leonila Mary MD  Primary Care Provider: Drea Martinez MD      HPI:   Alicia Chu is a 58 y.o. female that (in part)  has a past medical history of Colon polyps, Gastric bypass status for obesity, Hypertension, Postmenopausal HRT (hormone replacement therapy), and Ulcer.  has a past surgical history that includes Cholecystectomy; Gastric bypass; Hernia repair (x2); Knee arthroscopy w/ debridement; Infected skin debridement (x3); Breast biopsy (2005); and Abdominal surgery. Presents to [facility] Emergency Department With rectal bleeding.  Five day duration.  Having right bright red blood per rectum.  Foul-smelling.  Associated with abdominal cramping.  Symptoms began acutely on Saturday.  Describes stool as having coffee-grounds followed by bright red blood.  Noticed associated fatigue, lightheadedness, shortness of breath, dyspnea with exertion, and nausea.  History of bleeding ulcers from 6 years ago.  She had a colonoscopy 2 years ago.  Complains of gastric reflux symptoms.  Denies heavy use of NSAIDs or alcohol.  No recent visits with bariatric clinic.      In the emergency department routine laboratory studies were obtained which revealed a hemoglobin of 6.3 and an MCV of 107.  The patient recently started taking a multivitamin, but has not taken B12 or checked her iron level and many years she states.  2 units of packed red blood cells were typed and cross-matched for transfusion.  On physical exam external hemorrhoid and guaiac-positive stools were identified. GI was consulted for suspected upper GI bleed possibly secondary gastric or peptic ulcer.  She was given a Protonix infusion.    Hospital  "medicine has been asked to admit for further evaluation and treatment.       Procedure(s) (LRB):  EGD (ESOPHAGOGASTRODUODENOSCOPY) (N/A)      Hospital Course:   Admitted with concerns for upper GI bleed causing acute blood loss anemia.  She was transfused 2 units of blood for hemoglobin 6.3 with appropriate response to 8.1. Started on PPI gtt. She has not had no further bowel movements.  GI consulted. EGD 4/18 showed: "Evidence of a gastric bypass was found. A gastric pouch with a small size was found. The staple line appeared intact. The gastrojejunal anastomosis was characterized by edema, erythema, friable mucosa, inflammation, mild stenosis and a large ulceration with clean based." This is likely the cause of her acute blood loss anemia. Did very well afterwards and had no complaints other than constipation. No nausea or vomiting. Is to continue pantoprazole 40 mg BID and sucralfate x 2 weeks, then pantoprazole daily as per GI recs. Is to follow up as outpatient. If bleeding recurs, she may need to revisit surgical options. Heart healthy diet. Activity as tolerated. SW to arrange f/u.     Of note, there is an abdominal XR report in her chart from 4/18 but SHE DID NOT HAVE ANY XRAYS DONE. This was ERRONEOUSLY put in her chart and is actually the XR of her neighbor in the adjacent room. Radiology is aware of the issue and working to fix it and remove this XR reading from her chart.      Consults:   Consults (From admission, onward)        Status Ordering Provider     Inpatient consult to Gastroenterology  Once     Provider:  Clarence Gamboa LCSW    Completed CHARLOTTE BERRIOS            Final Active Diagnoses:    Diagnosis Date Noted POA    PRINCIPAL PROBLEM:  Acute upper GI bleeding [K92.2] 04/17/2019 Yes    Iron deficiency anemia [D50.9] 04/18/2019 Yes    Upper GI bleed [K92.2] 04/18/2019 Yes    History of Keyshawn-en-Y gastric bypass [Z98.84] 09/26/2013 Not Applicable    Acute post-hemorrhagic anemia [D62] " 09/26/2013 Yes    Gastric bypass status for obesity [Z98.84]  Not Applicable    Hypertension [I10]  Yes      Problems Resolved During this Admission:       Discharged Condition: good    Disposition: Home or Self Care    Follow Up:  Follow-up Information     [facility] Locations. Schedule an appointment as soon as possible for a visit in 1 week.    Specialty:  Gastroenterology  Why:  Call to schedule a gastroenterology hospital follow-up for within 1 week.   Contact information:  [address] [place] [address] [address]  [address]  [place] [address] [phone]                   Medications:  Reconciled Home Medications:      Medication List      START taking these medications    * pantoprazole 40 MG tablet  Commonly known as:  PROTONIX  Take 1 tablet (40 mg total) by mouth 2 (two) times daily. for 14 days     * pantoprazole 40 MG tablet  Commonly known as:  PROTONIX  Take 1 tablet (40 mg total) by mouth once daily.  Start taking on:  5/3/2019     polyethylene glycol 17 gram Pwpk  Commonly known as:  GLYCOLAX  Take 17 g by mouth 2 (two) times daily.     sucralfate 1 gram tablet  Commonly known as:  CARAFATE  Take 1 tablet (1 g total) by mouth 4 (four) times daily before meals and nightly. for 14 days         Indwelling Lines/Drains at time of discharge:   Lines/Drains/Airways          None          Time spent on the discharge of patient: > 35 minutes  Patient was seen and examined on the date of discharge and determined to be suitable for discharge.         Leonila Mary MD  Department of Hospital Medicine  [facility]

## 2019-04-19 NOTE — NURSING
Discharge instruction and prescriptions reviewed with pt, verbalized understanding. Iv removed with catheter intact. Pt aaox4. resp even and unlabored. In no acute distress. Awaiting ride eta 1:30pm.

## 2019-04-19 NOTE — PROGRESS NOTES
SCD sleeves removed for sleep, per patient request.  Patient removed non-skid socks, stating she does not like to sleep in them.  Patient verbalized understanding that when ambulating, socks should be put back on to minimize fall risks.

## 2019-04-19 NOTE — PLAN OF CARE
Problem: Fall Injury Risk  Goal: Absence of Fall and Fall-Related Injury    Intervention: Promote Injury-Free Environment     04/18/19 1915 04/19/19 0454   Optimize [place] and Functional Mobility   Environmental Safety Modification assistive device/personal items within reach  --    Optimize Balance and Safe Activity   Safety Promotion/Fall Prevention  --  bed alarm set;side rails raised x 2         Comments: Fall precautions observed through use of non-skid socks and bed alarm settings.  Skin integrity preserved by removal of SCD sleeves and assessment of skin.

## 2019-04-19 NOTE — PLAN OF CARE
04/19/19 1107   Post-Acute Status   Post-Acute Authorization   (Home; GI and PCP resources provided to establish and continue care)   Discharge Delays None

## 2019-04-19 NOTE — PLAN OF CARE
04/19/19 1106   Final Note   Assessment Type Final Discharge Note   Anticipated Discharge Disposition Home  (With GI and PCP resources to establish and continue care)   What phone number can be called within the next 1-3 days to see how you are doing after discharge?   ([phone] )   Hospital Follow Up  Appt(s) scheduled? No  (Unable to schedule)   Discharge plans and expectations educations in teach back method with documentation complete? Yes   Right Care Referral Info   Post Acute Recommendation No Care

## 2019-04-20 NOTE — ANESTHESIA POSTPROCEDURE EVALUATION
Anesthesia Post Evaluation    Patient: Alicia Chu    Procedure(s) Performed: Procedure(s) (LRB):  EGD (ESOPHAGOGASTRODUODENOSCOPY) (N/A)    Final Anesthesia Type: general  Patient location during evaluation: PACU  Patient participation: Yes- Able to Participate  Level of consciousness: awake and alert, oriented and awake  Post-procedure vital signs: reviewed and stable  Airway patency: patent  PONV status at discharge: No PONV  Anesthetic complications: no      Cardiovascular status: blood pressure returned to baseline  Respiratory status: unassisted, spontaneous ventilation and room air  Hydration status: euvolemic  Follow-up not needed.          Vitals Value Taken Time   /73 4/19/2019 11:22 AM   Temp 36.8 °C (98.2 °F) 4/19/2019 11:22 AM   Pulse 78 4/19/2019 11:22 AM   Resp 18 4/19/2019 11:22 AM   SpO2 99 % 4/19/2019 11:31 AM         Event Time     Out of Recovery 04/18/2019 13:05:24          Pain/Jasmin Score: Pain Rating Prior to Med Admin: 6 (4/19/2019  8:02 AM)  Pain Rating Post Med Admin: 2 (4/19/2019  9:02 AM)

## 2019-04-22 LAB — VIT B1 BLD-MCNC: 35 UG/L (ref 38–122)

## 2019-04-23 ENCOUNTER — PATIENT OUTREACH (OUTPATIENT)
Dept: ADMINISTRATIVE | Facility: CLINIC | Age: 59
End: 2019-04-23

## 2019-04-23 NOTE — PROGRESS NOTES
C3 nurse attempted to contact patient. No answer. The following message was left for the patient to return the call:  Good  afternoon  I am a nurse calling on behalf of [facility] from the [facility].  This is a Transitional Care Call for Alicia Chu . When you have a moment please contact us at [phone] or [phone] Monday through Friday, between the hours of 8 am to 4 pm. We look forward to speaking with you. On behalf of [facility] have a nice day.    The patient has a scheduled HOSFU appointment with Drea Martinez MD  on 6/13 @ 840am. Message sent to Physician staff.

## 2019-04-23 NOTE — PATIENT INSTRUCTIONS
When You Have Gastrointestinal (GI) Bleeding  Blood in vomit or stool can be a sign of gastrointestinal (GI) bleeding. GI bleeding can be scary, but the cause of the bleeding is usually not serious. Still, you should ALWAYS see a doctor if GI bleeding occurs.  The GI Tract  The GI tract is the path that food travels through the body. Food passes from the mouth down the esophagus (the tube from the mouth to the stomach). Food begins to break down in the stomach. It then moves through the duodenum, the first part of the small intestine. Nutrients are absorbed as food travels through the small intestine. What is left passes into the colon (large intestine) as waste. The colon removes water from the waste. Waste continues from the colon to the rectum (where stool is stored). Waste then leaves the body through the anus.  Causes of GI Bleeding  GI bleeding can be caused by many different problems. Some of the more common causes include:  Hemorrhoids  Ulcer (sore on the lining of the GI tract)  Cuts or scrapes in the mouth or throat  Infection (bacteria or parasites)  Food allergies  Medications  Inflammation (swelling or irritation of the lining of the GI tract)  Polyps (growths of tissue)  Abnormal pouches in part of the GI tract  Tears in the anus  Nosebleed  Diagnosing the Cause of Blood in Stool  If blood is coming out in your stool, it may signal a lower GI tract problem. Bleeding from the lower GI tract can be bright red, or it may look dark and tarry. Occult blood cant be seen with the eye, but can be found in the stool on tests. To determine the cause, tests that may be ordered include:  Blood tests  Hemoccult test: checks a stool sample for blood  Stool culture: checks a stool sample for bacteria or parasites  X-ray, ultrasound, or CT scan: imaging tests that take pictures of the digestive tract  Colonoscopy or sigmoidoscopy: a test during which a flexible tube with a camera is inserted through the anus into the  rectum to view the inside of your colon. This lets the doctor do a biopsy (take a tiny tissue sample).  Diagnosing the Cause of Blood in Vomit  Vomiting blood may signal an upper GI tract problem. To determine the cause, tests that may be ordered include:  Endoscopy: a test during which a flexible tube with a camera is inserted through the mouth and throat to see inside the upper GI tract. This lets the doctor do a biopsy (take a tiny tissue sample).  X-ray, ultrasound, or CT scan: tests that take pictures of the digestive tract  Upper GI series: x-rays of the upper part of the GI tract taken from inside the body    Call your healthcare provider right away if you have any of the following:  Bleeding from the mouth or anus that cant be stopped  Fever of 100.4°F or higher  Bleeding accompanied by lighheadedness or dizziness  Signs of dehydration (dry, sticky mouth; decreased urine output; very dark urine)   © 4990-5923 [facility], [address] [place] [address], [place] [address]. All rights reserved. This information is not intended as a substitute for professional medical care. Always follow your healthcare professional's instructions.

## 2019-04-23 NOTE — PHYSICIAN QUERY
PT Name: Alicia Chu  MR #: [id number]     PHYSICIAN QUERY -  ACUITY OF CONDITION CLARIFICATION      CDS Yen Zamudio RN, BSN        Contact Information:  [phone]    [email or web address]         This form is a permanent document in the medical record.     Query Date: April 23, 2019    By submitting this query, we are merely seeking further clarification of documentation to reflect the severity of illness of your patient. Please utilize your independent clinical judgment when addressing the question(s) below.    The Medical record reflects the following:     Indicators   Supporting Clinical Findings Location in Medical Record   X   Documentation of condition gastrojejunal anastomosis  ulceration    X   Lab Value(s) H&H:  6.3/20.2 --> 8.1/26.7 --> 8.4/27.1 --> 9.4/30.1 Labs: 4/17 --> 4/19     Radiology Findings     X   Treatment                                 Medication continue pantoprazole 40 mg BID and sucralfate x 2 weeks, then pantoprazole daily as per GI recs. Is to follow up as outpatient.    Transfused 2 units pRBC  DCS         4/17 Blood bank     Other 58 y.o. female that (in part)  has a past medical history of Colon polyps, Gastric bypass status    Presents     With rectal bleeding.  Five day duration.  Having right bright red blood per rectum.  Foul-smelling.  Associated with abdominal cramping.  Symptoms began acutely on Saturday.  Describes stool as having coffee-grounds followed by bright red blood.  Noticed associated fatigue, lightheadedness, shortness of breath, dyspnea with exertion, and nausea.  History of bleeding ulcers from 6 years ago.  Complains of gastric reflux symptoms.     GI was consulted for suspected upper GI bleed possibly secondary gastric or peptic ulcer.  She was given a Protonix infusion.    Findings:       The esophagus was normal.       Evidence of a gastric bypass was found. A gastric pouch with a small size was found.   The staple line appeared intact.   The  gastrojejunal anastomosis was characterized by edema, erythema, friable mucosa, inflammation, mild stenosis and a large ulceration with clean based.        The anastomosis was traversed without difficulty.       The examined jejunum was normal. Jejunojejunal anastomosis was not  reached.   H&P                                    4/18 EGD      Provider, please specify the acuity/chronicity of   gastrojejunal anastomosis  ulceration    [ x  ] Acute   [   ] Chronic   [   ] Acute and/on chronic   [   ]  Clinically Undetermined       Please document in your progress notes daily for the duration of treatment until resolved, and include in your discharge summary.

## 2019-04-24 NOTE — TELEPHONE ENCOUNTER
She should keep her scheduled appointment. I will be out of the office for a conference. If she feels she needs to be seen earlier, she may need to see another provider

## 2019-05-28 ENCOUNTER — PATIENT MESSAGE (OUTPATIENT)
Dept: FAMILY MEDICINE | Facility: CLINIC | Age: 59
End: 2019-05-28

## 2019-06-18 ENCOUNTER — TELEPHONE (OUTPATIENT)
Dept: FAMILY MEDICINE | Facility: CLINIC | Age: 59
End: 2019-06-18

## 2019-06-18 NOTE — TELEPHONE ENCOUNTER
This a new patient medicaid. I do not known if she can be seen by a primary at this clinic. She may have to follow-up with a physician at [facility].

## 2019-06-18 NOTE — TELEPHONE ENCOUNTER
I have attempted without success to contact this patient by phone and LVM for patient to return call for message below.

## 2019-06-18 NOTE — TELEPHONE ENCOUNTER
Pt requesting appointment for hospital f/u . No openings       Please advise.      Thanks,  Baldemar

## 2019-06-21 ENCOUNTER — OFFICE VISIT (OUTPATIENT)
Dept: FAMILY MEDICINE | Facility: CLINIC | Age: 59
End: 2019-06-21
Payer: MEDICAID

## 2019-06-21 ENCOUNTER — LAB VISIT (OUTPATIENT)
Dept: LAB | Facility: HOSPITAL | Age: 59
End: 2019-06-21
Attending: NURSE PRACTITIONER
Payer: MEDICAID

## 2019-06-21 VITALS
BODY MASS INDEX: 25.4 KG/M2 | SYSTOLIC BLOOD PRESSURE: 120 MMHG | TEMPERATURE: 98 F | OXYGEN SATURATION: 99 % | HEART RATE: 86 BPM | DIASTOLIC BLOOD PRESSURE: 84 MMHG | HEIGHT: 66 IN | RESPIRATION RATE: 16 BRPM | WEIGHT: 158.06 LBS

## 2019-06-21 DIAGNOSIS — Z00.00 ROUTINE HEALTH MAINTENANCE: ICD-10-CM

## 2019-06-21 DIAGNOSIS — Z12.11 SCREENING FOR MALIGNANT NEOPLASM OF COLON: ICD-10-CM

## 2019-06-21 DIAGNOSIS — Z98.84 GASTRIC BYPASS STATUS FOR OBESITY: Primary | ICD-10-CM

## 2019-06-21 DIAGNOSIS — K92.2 ACUTE UPPER GI BLEEDING: ICD-10-CM

## 2019-06-21 DIAGNOSIS — I10 ESSENTIAL HYPERTENSION: ICD-10-CM

## 2019-06-21 DIAGNOSIS — Z98.84 HISTORY OF ROUX-EN-Y GASTRIC BYPASS: ICD-10-CM

## 2019-06-21 LAB
BASOPHILS # BLD AUTO: 0.05 K/UL (ref 0–0.2)
BASOPHILS NFR BLD: 1.4 % (ref 0–1.9)
DIFFERENTIAL METHOD: ABNORMAL
EOSINOPHIL # BLD AUTO: 0.1 K/UL (ref 0–0.5)
EOSINOPHIL NFR BLD: 2.7 % (ref 0–8)
ERYTHROCYTE [DISTWIDTH] IN BLOOD BY AUTOMATED COUNT: 16.9 % (ref 11.5–14.5)
HCT VFR BLD AUTO: 36.7 % (ref 37–48.5)
HGB BLD-MCNC: 11.1 G/DL (ref 12–16)
IMM GRANULOCYTES # BLD AUTO: 0.03 K/UL (ref 0–0.04)
IMM GRANULOCYTES NFR BLD AUTO: 0.8 % (ref 0–0.5)
LYMPHOCYTES # BLD AUTO: 1.2 K/UL (ref 1–4.8)
LYMPHOCYTES NFR BLD: 32.9 % (ref 18–48)
MCH RBC QN AUTO: 31.6 PG (ref 27–31)
MCHC RBC AUTO-ENTMCNC: 30.2 G/DL (ref 32–36)
MCV RBC AUTO: 105 FL (ref 82–98)
MONOCYTES # BLD AUTO: 0.7 K/UL (ref 0.3–1)
MONOCYTES NFR BLD: 19.3 % (ref 4–15)
NEUTROPHILS # BLD AUTO: 1.6 K/UL (ref 1.8–7.7)
NEUTROPHILS NFR BLD: 42.9 % (ref 38–73)
NRBC BLD-RTO: 0 /100 WBC
PLATELET # BLD AUTO: 133 K/UL (ref 150–350)
PMV BLD AUTO: 11.3 FL (ref 9.2–12.9)
RBC # BLD AUTO: 3.51 M/UL (ref 4–5.4)
WBC # BLD AUTO: 3.68 K/UL (ref 3.9–12.7)

## 2019-06-21 PROCEDURE — 99999 PR PBB SHADOW E&M-EST. PATIENT-LVL IV: CPT | Mod: PBBFAC,,, | Performed by: NURSE PRACTITIONER

## 2019-06-21 PROCEDURE — 83036 HEMOGLOBIN GLYCOSYLATED A1C: CPT

## 2019-06-21 PROCEDURE — 80061 LIPID PANEL: CPT

## 2019-06-21 PROCEDURE — 85025 COMPLETE CBC W/AUTO DIFF WBC: CPT

## 2019-06-21 PROCEDURE — 99999 PR PBB SHADOW E&M-EST. PATIENT-LVL IV: ICD-10-PCS | Mod: PBBFAC,,, | Performed by: NURSE PRACTITIONER

## 2019-06-21 PROCEDURE — 99214 PR OFFICE/OUTPT VISIT, EST, LEVL IV, 30-39 MIN: ICD-10-PCS | Mod: S$PBB,,, | Performed by: NURSE PRACTITIONER

## 2019-06-21 PROCEDURE — 99214 OFFICE O/P EST MOD 30 MIN: CPT | Mod: S$PBB,,, | Performed by: NURSE PRACTITIONER

## 2019-06-21 PROCEDURE — 86803 HEPATITIS C AB TEST: CPT

## 2019-06-21 PROCEDURE — 80053 COMPREHEN METABOLIC PANEL: CPT

## 2019-06-21 PROCEDURE — 99214 OFFICE O/P EST MOD 30 MIN: CPT | Mod: PBBFAC,PO | Performed by: NURSE PRACTITIONER

## 2019-06-21 PROCEDURE — 36415 COLL VENOUS BLD VENIPUNCTURE: CPT | Mod: PO

## 2019-06-21 NOTE — PROGRESS NOTES
Subjective:        Chief Complaint  Chief Complaint   Patient presents with    Abdominal Pain    Fatigue    GI Problem     Was in hospital for stoamch ulcer in April. F/U      Weight Loss       HPI  Alicia Chu is a 58 y.o. female with multiple medical diagnoses as listed in the medical history and problem list that presents for abdominal pain.  Patient is new to me. Gastric bypass 2006. Has had consistent pain in upper abdomen since the surgery. Second time with bleeding gastric ulcer. Experiencing pain, and then almost immediately BM. Reports daily BMs and more normalized routine after the last few weeks, since she stopped binge drinking two weeks ago. Has been drinking iconic protein, eggs, steak, and fish, salad. Has been trying to eat 3 meals a day. Reports history of alcoholism. Will binge for 2-3 days. Usually wine, vodka as well. Has tried AA in the past without success - trying to find a support group. She reports significant improvement in symptoms and mood since stopping alcohol two weeks ago. Reports her  is an alcoholic and it is very difficult for her. Takes multivitamin, b12, and magnesium. Takes Alive chewable for women. She denies fever, chills, nausea, vomiting.     Reports weight loss, -2lbs over the last 2 months.        PAST MEDICAL HISTORY:  Past Medical History:   Diagnosis Date    Colon polyps     Gastric bypass status for obesity 2006    Hypertension     Postmenopausal HRT (hormone replacement therapy)     Ulcer        PAST SURGICAL HISTORY:  Past Surgical History:   Procedure Laterality Date    ABDOMINAL SURGERY      BREAST BIOPSY  2005    right    CHOLECYSTECTOMY      COLONOSCOPY N/A 3/21/2014    Performed by [facility] ([facility])    EGD (ESOPHAGOGASTRODUODENOSCOPY) N/A 4/18/2019    Performed by Mony Plunkett [facility] at [facility]    EGD (ESOPHAGOGASTRODUODENOSCOPY) N/A 9/27/2013    Performed by [facility] Forde [facility] at [facility]    GASTRIC BYPASS       zackary en Y    HERNIA REPAIR  x2    LI hernia    INFECTED SKIN DEBRIDEMENT  x3    KNEE ARTHROSCOPY W/ DEBRIDEMENT      right       SOCIAL HISTORY:  Social History     Socioeconomic History    Marital status:      Spouse name: Not on file    Number of children: Not on file    Years of education: Not on file    Highest education level: Not on file   Occupational History    Occupation: work at [employer]     Employer: [employer]   Social Needs    Financial resource strain: Not on file    Food insecurity:     Worry: Not on file     Inability: Not on file    Transportation needs:     Medical: Not on file     Non-medical: Not on file   Tobacco Use    Smoking status: Never Smoker    Smokeless tobacco: Never Used   Substance and Sexual Activity    Alcohol use: Yes     Alcohol/week: 0.0 oz     Comment: occasional    Drug use: No    Sexual activity: Yes     Partners: Male     Birth control/protection: None   Lifestyle    Physical activity:     Days per week: 0 days     Minutes per session: Not on file    Stress: To some extent   Relationships    Social connections:     Talks on phone: Not on file     Gets together: Not on file     Attends Yarsanism service: Not on file     Active member of club or organization: Not on file     Attends meetings of clubs or organizations: Not on file     Relationship status: Not on file   Other Topics Concern    Not on file   Social History Narrative    Not on file       FAMILY HISTORY:  Family History   Problem Relation Age of Onset    Cancer Mother         colon    Dementia Father     Hypertension Father     Hyperlipidemia Father     Alzheimer's disease Father 78    Cancer Paternal Grandfather         colon cancer    Breast cancer Maternal Uncle        ALLERGIES AND MEDICATIONS: updated and reviewed.  Review of patient's allergies indicates:   Allergen Reactions    Aspirin      Due to gastric bypass    Ibuprofen      Other reaction(s): Due To Gastric  "Bypass  Due to gastric bypass    Inderal [propranolol]      "Felt bizarre"     Current Outpatient Medications   Medication Sig Dispense Refill    pantoprazole (PROTONIX) 40 MG tablet Take 1 tablet (40 mg total) by mouth once daily. 30 tablet 11     No current facility-administered medications for this visit.          ROS  Review of Systems   Constitutional: Negative for appetite change, chills and fever.   Respiratory: Negative for shortness of breath and wheezing.    Cardiovascular: Negative for chest pain.   Gastrointestinal: Positive for abdominal pain (chronic) and diarrhea. Negative for abdominal distention, anal bleeding, blood in stool, constipation, nausea, rectal pain and vomiting.   Neurological: Negative for headaches.   Psychiatric/Behavioral: Negative for behavioral problems and confusion.         Objective:     Physical Exam  Vitals:    06/21/19 1531   BP: 120/84   BP Location: Right arm   Patient Position: Sitting   BP Method: Small (Manual)   Pulse: 86   Resp: 16   Temp: 98.2 °F (36.8 °C)   TempSrc: Oral   SpO2: 99%   Weight: 71.7 kg (158 lb 1.1 oz)   Height: 5' 6" (1.676 m)    Body mass index is 25.51 kg/m².  Weight: 71.7 kg (158 lb 1.1 oz)   Height: 5' 6" (167.6 cm)   Physical Exam   Constitutional: She appears well-developed. No distress.   HENT:   Head: Normocephalic and atraumatic.   Mouth/Throat: Oropharynx is clear and moist.   Eyes: Pupils are equal, round, and reactive to light. Conjunctivae and EOM are normal.   Neck: Normal range of motion. Neck supple. No thyromegaly present.   Cardiovascular: Normal rate, normal heart sounds and intact distal pulses.   No murmur heard.  Pulmonary/Chest: Effort normal and breath sounds normal.   Abdominal: There is generalized tenderness. There is no rigidity, no rebound, no guarding, no CVA tenderness, no tenderness at McBurney's point and negative Zamora's sign.   Musculoskeletal: She exhibits no edema or deformity.   Lymphadenopathy:     She has no " cervical adenopathy.   Neurological: She is alert. No cranial nerve deficit.   Skin: Skin is warm and dry.   Psychiatric: She has a normal mood and affect. Her behavior is normal.   Vitals reviewed.      Assessment:     1. Gastric bypass status for obesity    2. Acute upper GI bleeding    3. History of Keyshawn-en-Y gastric bypass    4. Routine health maintenance    5. Screening for malignant neoplasm of colon    6. Essential hypertension      Plan:     Alicia was seen today for abdominal pain, fatigue, gi problem and weight loss.    Diagnoses and all orders for this visit:    Gastric bypass status for obesity  Acute upper GI bleeding  History of Keyshawn-en-Y gastric bypass  -     Ambulatory referral to Social Work - patient needs to find a GI specialist that takes her insurance  -     CBC auto differential; Future  -     Comprehensive metabolic panel; Future  -     Will recheck blood counts today from hospitalization in April and start iron supplementation if indicated  -     Strongly encouraged patient to continue to avoid alcohol and focused on balanced meals, including leafy green vegetables    Routine health maintenance  -     Hepatitis C antibody; Future  -     Lipid panel; Future  -     Hemoglobin A1c; Future  -     To be completed today    Screening for malignant neoplasm of colon  -     Case request GI: COLONOSCOPY  -     Adults 50 to 75 years of age should be screened for colorectal cancer with an FOBT annually, sigmoidoscopy every five years with an FOBT every three years, or colonoscopy every 10 years.    Essential hypertension         -    BP controlled presently - reviewed anti-hypertensive regimen - continue current therapy      Health Maintenance       Date Due Completion Date    Hepatitis C Screening 1960 ---    TETANUS VACCINE 09/13/1979 ---    Shingles Vaccine (1 of 2) 09/13/2011 ---    Influenza Vaccine 08/01/2019 9/27/2013    Mammogram 11/08/2019 11/8/2017    Lipid Panel 02/25/2020 2/25/2015    Pap  Smear with HPV Cotest 11/07/2020 11/7/2017    Colonoscopy 03/21/2024 3/21/2014          Health Maintenance reviewed, addressed as per orders    Follow-up: Follow up in about 1 month (around 7/21/2019), or if symptoms worsen or fail to improve.    The patient expressed understanding and no barriers to adherence were identified.     1. The patient indicates understanding of these issues and agrees with the plan. Brief care plan is updated and reviewed with the patient as applicable.     2. The patient is given an After Visit Summary that lists all medications with directions, allergies, orders placed during this encounter and follow-up instructions.     3. I have reviewed the patient's medical information including past medical, family, and social history sections including the medications and allergies.     4. We discussed the patient's current medications. I reconciled the patient's medication list and prepared and supplied needed refills.       Fani Whitten, DNP, APRN, FNP-c  Family Medicine    My collaborating physicians are Dr. Angel Luis Azar and Dr. Gautam Almanza

## 2019-06-21 NOTE — PATIENT INSTRUCTIONS
Prevention Guidelines, Women Ages 50 to 64  Screening tests and vaccines are an important part of managing your health. Health counseling is essential, too. Below are guidelines for these, for women ages 50 to 64. Talk with your healthcare provider to make sure youre up to date on what you need.  Screening Who needs it How often   Type 2 diabetes or prediabetes All adults beginning at age 45 and adults without symptoms at any age who are overweight or obese and have 1 or more additional risk factors for diabetes. At  least every 3 years   Alcohol misuse All women in this age group At routine exams   Blood pressure All women in this age group Every 2 years if your blood pressure is less than 120/80 mm Hg; yearly if your systolic blood pressure is 120 to 139 mm Hg, or your diastolic blood pressure reading is 80 to 89 mm Hg   Breast cancer All women in this age group Yearly mammogram and clinical breast exam1   Cervical cancer All women in this age group, except women who have had a complete hysterectomy Pap test every 3 years or Pap test with human papillomavirus (HPV) test every 5 years   Chlamydia Women at increased risk for infection At routine exams   Colorectal cancer All women in this age group Flexible sigmoidoscopy every 5 years, or colonoscopy every 10 years, or double-contrast barium enema every 5 years; yearly fecal occult blood test or fecal immunochemical test; or a stool DNA test as often as your health care provider advises; talk with your health care provider about which tests are best for you   Depression All women in this age group At routine exams   Gonorrhea Sexually active women at increased risk for infection At routine exams   Hepatitis C Anyone at increased risk; 1 time for those born between 1945 and 1965 At routine exams   High cholesterol or triglycerides All women in this age group who are at risk for coronary artery disease At least every 5 years   HIV All women At routine exams   Lung  cancer Adults age 55 to 80 who have smoked Yearly screening in smokers with 30 pack-year history of smoking or who quit within 15 years   Obesity All women in this age group At routine exams   Osteoporosis Women who are postmenopausal Ask your healthcare provider   Syphilis Women at increased risk for infection - talk with your healthcare provider At routine exams   Tuberculosis Women at increased risk for infection - talk with your healthcare provider Ask your healthcare provider   Vision All women in this age group Ask your healthcare provider   Vaccine Who needs it How often   Chickenpox (varicella) All women in this age group who have no record of this infection or vaccine 2 doses; the second dose should be given at least 4 weeks after the first dose   Hepatitis A Women at increased risk for infection - talk with your healthcare provider 2 doses given at least 6 months apart   Hepatitis B Women at increased risk for infection - talk with your healthcare provider 3 doses over 6 months; second dose should be given 1 month after the first dose; the third dose should be given at least 2 months after the second dose and at least 4 months after the first dose   Haemophilus influenzaeType B (HIB) Women at increased risk for infection - talk with your healthcare provider 1 to 3 doses   Influenza (flu) All women in this age group Once a year   Measles, mumps, rubella (MMR) Women in this age group through their late 50s who have no record of these infections or vaccines 1 dose   Meningococcal Women at increased risk for infection - talk with your healthcare provider 1 or more doses   Pneumococcal conjugate vaccine (PCV13) and pneumococcal polysaccharide vaccine (PPSV23) Women at increased risk for infection - talk with your healthcare provider PCV13: 1 dose ages 19 to 65 (protects against 13 types of pneumococcal bacteria)  PPSV23: 1 to 2 doses through age 64, or 1 dose at 65 or older (protects against 23 types of  pneumococcal bacteria)   Tetanus/diphtheria/pertussis (Td/Tdap) booster All women in this age group Td every 10 years, or a one-time dose of Tdap instead of a Td booster after age 18, then Td every 10 years   Zoster All women ages 60 and older 1 dose   Counseling Who needs it How often   BRCA gene mutation testing for breast and ovarian cancer susceptibility Women with increased risk for having gene mutation When your risk is known   Breast cancer and chemoprevention Women at high risk for breast cancer When your risk is known   Diet and exercise Women who are overweight or obese When diagnosed, and then at routine exams   Sexually transmitted infection prevention Women at increased risk for infection - talk with your healthcare provider At routine exams   Use of daily aspirin Women ages 55 and up in this age group who are at risk for cardiovascular health problems such as stroke When your risk is known   Use of tobacco and the health effects it can cause All women in this age group Every exam   1American [employer] Society  Date Last Reviewed: 1/26/2016  © 3874-2512 [employer], [employer]. [address] [place] [address], [place] [address]. All rights reserved. This information is not intended as a substitute for professional medical care. Always follow your healthcare professional's instructions.

## 2019-06-22 LAB
ALBUMIN SERPL BCP-MCNC: 2.8 G/DL (ref 3.5–5.2)
ALP SERPL-CCNC: 209 U/L (ref 55–135)
ALT SERPL W/O P-5'-P-CCNC: 40 U/L (ref 10–44)
ANION GAP SERPL CALC-SCNC: 11 MMOL/L (ref 8–16)
AST SERPL-CCNC: 84 U/L (ref 10–40)
BILIRUB SERPL-MCNC: 0.3 MG/DL (ref 0.1–1)
BUN SERPL-MCNC: 11 MG/DL (ref 6–20)
CALCIUM SERPL-MCNC: 8.8 MG/DL (ref 8.7–10.5)
CHLORIDE SERPL-SCNC: 108 MMOL/L (ref 95–110)
CHOLEST SERPL-MCNC: 168 MG/DL (ref 120–199)
CHOLEST/HDLC SERPL: 1.7 {RATIO} (ref 2–5)
CO2 SERPL-SCNC: 22 MMOL/L (ref 23–29)
CREAT SERPL-MCNC: 0.8 MG/DL (ref 0.5–1.4)
EST. GFR  (AFRICAN AMERICAN): >60 ML/MIN/1.73 M^2
EST. GFR  (NON AFRICAN AMERICAN): >60 ML/MIN/1.73 M^2
ESTIMATED AVG GLUCOSE: 108 MG/DL (ref 68–131)
GLUCOSE SERPL-MCNC: 94 MG/DL (ref 70–110)
HBA1C MFR BLD HPLC: 5.4 % (ref 4–5.6)
HDLC SERPL-MCNC: 98 MG/DL (ref 40–75)
HDLC SERPL: 58.3 % (ref 20–50)
LDLC SERPL CALC-MCNC: 60 MG/DL (ref 63–159)
NONHDLC SERPL-MCNC: 70 MG/DL
POTASSIUM SERPL-SCNC: 4 MMOL/L (ref 3.5–5.1)
PROT SERPL-MCNC: 5.8 G/DL (ref 6–8.4)
SODIUM SERPL-SCNC: 141 MMOL/L (ref 136–145)
TRIGL SERPL-MCNC: 50 MG/DL (ref 30–150)

## 2019-06-24 LAB — HCV AB SERPL QL IA: NEGATIVE

## 2020-05-25 ENCOUNTER — TELEPHONE (OUTPATIENT)
Dept: PSYCHIATRY | Facility: CLINIC | Age: 60
End: 2020-05-25

## 2020-05-25 NOTE — TELEPHONE ENCOUNTER
Received message that this patient was requesting appointment with Dr Kay. Called her back and LVM to let her know that next new patient appointment would be in August, also if Medicaid is still primary insurance for her we will not be able to schedule her at this time. Asked that she call us back if different primary insurance. Left phone information for [facility] and suggested that she also call her insurance for list of providers. This information was left on her personal cell number on which she identified herself by name.

## 2020-07-01 ENCOUNTER — OFFICE VISIT (OUTPATIENT)
Dept: FAMILY MEDICINE | Facility: CLINIC | Age: 60
End: 2020-07-01
Payer: COMMERCIAL

## 2020-07-01 VITALS
WEIGHT: 169.56 LBS | DIASTOLIC BLOOD PRESSURE: 84 MMHG | RESPIRATION RATE: 19 BRPM | OXYGEN SATURATION: 99 % | HEART RATE: 86 BPM | SYSTOLIC BLOOD PRESSURE: 132 MMHG | BODY MASS INDEX: 27.36 KG/M2 | TEMPERATURE: 98 F

## 2020-07-01 DIAGNOSIS — Z98.84 HISTORY OF ROUX-EN-Y GASTRIC BYPASS: ICD-10-CM

## 2020-07-01 DIAGNOSIS — F33.9 EPISODE OF RECURRENT MAJOR DEPRESSIVE DISORDER, UNSPECIFIED DEPRESSION EPISODE SEVERITY: ICD-10-CM

## 2020-07-01 DIAGNOSIS — Z12.31 VISIT FOR SCREENING MAMMOGRAM: ICD-10-CM

## 2020-07-01 DIAGNOSIS — Z11.4 ENCOUNTER FOR SCREENING FOR HIV: ICD-10-CM

## 2020-07-01 DIAGNOSIS — E53.8 B12 DEFICIENCY: ICD-10-CM

## 2020-07-01 DIAGNOSIS — D53.9 MACROCYTIC ANEMIA: ICD-10-CM

## 2020-07-01 DIAGNOSIS — K25.7 CHRONIC GASTRIC ULCER, UNSPECIFIED WHETHER GASTRIC ULCER HEMORRHAGE OR PERFORATION PRESENT: Primary | ICD-10-CM

## 2020-07-01 PROCEDURE — 99999 PR PBB SHADOW E&M-EST. PATIENT-LVL III: CPT | Mod: PBBFAC,,, | Performed by: FAMILY MEDICINE

## 2020-07-01 PROCEDURE — 3008F PR BODY MASS INDEX (BMI) DOCUMENTED: ICD-10-PCS | Mod: CPTII,S$GLB,, | Performed by: FAMILY MEDICINE

## 2020-07-01 PROCEDURE — 3079F DIAST BP 80-89 MM HG: CPT | Mod: CPTII,S$GLB,, | Performed by: FAMILY MEDICINE

## 2020-07-01 PROCEDURE — 99999 PR PBB SHADOW E&M-EST. PATIENT-LVL III: ICD-10-PCS | Mod: PBBFAC,,, | Performed by: FAMILY MEDICINE

## 2020-07-01 PROCEDURE — 99214 OFFICE O/P EST MOD 30 MIN: CPT | Mod: S$GLB,,, | Performed by: FAMILY MEDICINE

## 2020-07-01 PROCEDURE — 3075F SYST BP GE 130 - 139MM HG: CPT | Mod: CPTII,S$GLB,, | Performed by: FAMILY MEDICINE

## 2020-07-01 PROCEDURE — 3008F BODY MASS INDEX DOCD: CPT | Mod: CPTII,S$GLB,, | Performed by: FAMILY MEDICINE

## 2020-07-01 PROCEDURE — 3075F PR MOST RECENT SYSTOLIC BLOOD PRESS GE 130-139MM HG: ICD-10-PCS | Mod: CPTII,S$GLB,, | Performed by: FAMILY MEDICINE

## 2020-07-01 PROCEDURE — 99214 PR OFFICE/OUTPT VISIT, EST, LEVL IV, 30-39 MIN: ICD-10-PCS | Mod: S$GLB,,, | Performed by: FAMILY MEDICINE

## 2020-07-01 PROCEDURE — 3079F PR MOST RECENT DIASTOLIC BLOOD PRESSURE 80-89 MM HG: ICD-10-PCS | Mod: CPTII,S$GLB,, | Performed by: FAMILY MEDICINE

## 2020-07-01 RX ORDER — PANTOPRAZOLE SODIUM 40 MG/1
TABLET, DELAYED RELEASE ORAL
Qty: 45 TABLET | Refills: 0 | Status: ON HOLD | OUTPATIENT
Start: 2020-07-01 | End: 2020-08-06 | Stop reason: SDUPTHER

## 2020-07-01 RX ORDER — FLUOXETINE HYDROCHLORIDE 20 MG/1
20 CAPSULE ORAL NIGHTLY
Qty: 90 CAPSULE | Refills: 0 | Status: ON HOLD | OUTPATIENT
Start: 2020-07-01 | End: 2020-08-06 | Stop reason: SDUPTHER

## 2020-07-01 RX ORDER — FLUOXETINE HYDROCHLORIDE 60 MG/1
1 TABLET, FILM COATED ORAL; ORAL DAILY
COMMUNITY
Start: 2020-05-24 | End: 2020-07-01

## 2020-07-01 RX ORDER — TRAZODONE HYDROCHLORIDE 100 MG/1
TABLET ORAL
COMMUNITY
Start: 2020-05-24 | End: 2020-07-01 | Stop reason: ALTCHOICE

## 2020-07-01 RX ORDER — FLUOXETINE HYDROCHLORIDE 40 MG/1
40 CAPSULE ORAL DAILY
Qty: 90 CAPSULE | Refills: 0 | Status: ON HOLD | OUTPATIENT
Start: 2020-07-01 | End: 2020-08-06 | Stop reason: HOSPADM

## 2020-07-01 RX ORDER — HYDROXYZINE PAMOATE 25 MG/1
CAPSULE ORAL
COMMUNITY
Start: 2020-05-24

## 2020-07-01 NOTE — PROGRESS NOTES
Subjective:       Patient ID: Alicia Chu is a 59 y.o. female.    Chief Complaint: Abdominal Pain (ongoning for 1 mos)    Abdominal Pain  This is a recurrent problem. Episode onset: recurring for last 3 weeks-states she has a chronic ulcer. The problem occurs intermittently. Pertinent negatives include no dysuria, frequency or headaches. Her past medical history is significant for abdominal surgery (gastric bypass- Keyshawn-en-y).   She states that in the past she was on Protonix for the pain and this had helped.   She also reports a history of anemia. She used to take vitamins after her surgery, but has not been following anyone for this. She also reports that she used to drink alcohol more than she does now but does not anymore. The patient also reports a history of depression for which she is on Prozac 60 mg, however, she state that her insurance would no longer cover it. She has an upcoming appointment with psychiatry.    Review of Systems   Constitutional: Positive for fatigue. Negative for unexpected weight change.   HENT: Negative for ear pain and sore throat.    Eyes: Negative for visual disturbance.   Respiratory: Negative for shortness of breath.    Cardiovascular: Negative for chest pain.   Gastrointestinal: Positive for abdominal pain. Negative for blood in stool.   Endocrine: Negative for cold intolerance and heat intolerance.   Genitourinary: Negative for dysuria and frequency.   Integumentary:  Negative for rash.   Neurological: Negative for weakness, numbness and headaches.   Hematological: Negative for adenopathy.   Psychiatric/Behavioral: Negative for suicidal ideas.         Objective:      Physical Exam  Vitals signs reviewed.   Constitutional:       General: She is not in acute distress.     Appearance: She is well-developed. She is not diaphoretic.   HENT:      Head: Normocephalic and atraumatic.      Right Ear: Tympanic membrane, ear canal and external ear normal.      Left Ear: Tympanic  membrane, ear canal and external ear normal.      Nose: Nose normal.   Eyes:      General:         Right eye: No discharge.         Left eye: No discharge.      Conjunctiva/sclera: Conjunctivae normal.      Pupils: Pupils are equal, round, and reactive to light.   Neck:      Musculoskeletal: Normal range of motion and neck supple.      Thyroid: No thyromegaly.      Trachea: No tracheal deviation.   Cardiovascular:      Rate and Rhythm: Normal rate and regular rhythm.      Pulses:           Radial pulses are 2+ on the right side and 2+ on the left side.      Heart sounds: Normal heart sounds, S1 normal and S2 normal. No murmur.   Pulmonary:      Effort: Pulmonary effort is normal. No respiratory distress.      Breath sounds: Normal breath sounds. No wheezing, rhonchi or rales.   Abdominal:      General: Bowel sounds are normal. There is no distension.      Palpations: Abdomen is soft. Abdomen is not rigid. There is no mass.      Tenderness: There is no abdominal tenderness. There is no guarding.   Lymphadenopathy:      Cervical: No cervical adenopathy.   Skin:     General: Skin is warm and dry.      Capillary Refill: Capillary refill takes less than 2 seconds.      Findings: No rash.   Neurological:      Mental Status: She is alert and oriented to person, place, and time.      Cranial Nerves: No cranial nerve deficit.      Sensory: No sensory deficit.      Motor: No atrophy or abnormal muscle tone.      Deep Tendon Reflexes:      Reflex Scores:       Patellar reflexes are 2+ on the right side and 2+ on the left side.  Psychiatric:         Behavior: Behavior normal.         Assessment:       1. Chronic gastric ulcer, unspecified whether gastric ulcer hemorrhage or perforation present    2. Macrocytic anemia    3. History of Keyshawn-en-Y gastric bypass    4. B12 deficiency    5. Episode of recurrent major depressive disorder, unspecified depression episode severity    6. Encounter for screening for HIV    7. Visit for  screening mammogram        Plan:       Alicia was seen today for abdominal pain.    Diagnoses and all orders for this visit:    Chronic gastric ulcer, unspecified whether gastric ulcer hemorrhage or perforation present  -     CBC auto differential; Future  -     Comprehensive metabolic panel; Future  -     Vitamin B12; Future  -     Vitamin B1; Future  -     TSH; Future  -     Iron and TIBC; Future  -     Ferritin; Future  -     Reticulocytes; Future  -     Lactate Dehydrogenase; Future  -     pantoprazole (PROTONIX) 40 MG tablet; Take 1 tablet PO BID x 2 weeks, then 1 tablet PO QD  Restart Protonix.  If does not improve pain within a week, will refer to GI  Check labs given history of gastric-bypass    Macrocytic anemia  -     CBC auto differential; Future  -     Comprehensive metabolic panel; Future  -     Vitamin B12; Future  -     Vitamin B1; Future  -     TSH; Future  -     Iron and TIBC; Future  -     Ferritin; Future  -     Reticulocytes; Future  -     Lactate Dehydrogenase; Future  As above    History of Keyshawn-en-Y gastric bypass  -     CBC auto differential; Future  -     Comprehensive metabolic panel; Future  -     Vitamin B12; Future  -     Vitamin B1; Future  -     TSH; Future  -     Iron and TIBC; Future  -     Ferritin; Future  -     Reticulocytes; Future  -     Lactate Dehydrogenase; Future  As above    B12 deficiency  -     CBC auto differential; Future  -     Comprehensive metabolic panel; Future  -     Vitamin B12; Future  -     Vitamin B1; Future  -     TSH; Future  -     Iron and TIBC; Future  -     Ferritin; Future  -     Reticulocytes; Future  -     Lactate Dehydrogenase; Future  As above    Episode of recurrent major depressive disorder, unspecified depression episode severity  -     FLUoxetine 40 MG capsule; Take 1 capsule (40 mg total) by mouth once daily. Also taking fluoxetine 20 mg QPM  -     FLUoxetine 20 MG capsule; Take 1 capsule (20 mg total) by mouth every evening. Also taking Fluoxetine  40 mg QAM  Based on EMR appears that capsules are covered by insurance and patient was informed. Fluoxetine does not comes in a 60 mg capsule so will do a 40 mg and 20 mg capsule  Follow up with psychiatry    Encounter for screening for HIV  -     HIV 1/2 Ag/Ab (4th Gen); Future  Screen for HIV as per USPSTF guidelines.     Visit for screening mammogram  -     Mammo Digital Screening Bilat w/ Diaz; Future

## 2020-07-04 PROCEDURE — 96374 THER/PROPH/DIAG INJ IV PUSH: CPT

## 2020-07-04 PROCEDURE — 99285 EMERGENCY DEPT VISIT HI MDM: CPT | Mod: 25

## 2020-07-05 ENCOUNTER — HOSPITAL ENCOUNTER (EMERGENCY)
Facility: HOSPITAL | Age: 60
Discharge: HOME | End: 2020-07-05
Attending: EMERGENCY MEDICINE
Payer: COMMERCIAL

## 2020-07-05 VITALS
HEIGHT: 66 IN | OXYGEN SATURATION: 96 % | WEIGHT: 169 LBS | TEMPERATURE: 99 F | RESPIRATION RATE: 14 BRPM | DIASTOLIC BLOOD PRESSURE: 57 MMHG | SYSTOLIC BLOOD PRESSURE: 112 MMHG | BODY MASS INDEX: 27.16 KG/M2 | HEART RATE: 74 BPM

## 2020-07-05 DIAGNOSIS — F10.920 ALCOHOLIC INTOXICATION WITHOUT COMPLICATION: Primary | ICD-10-CM
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
## 2020-07-05 LAB
ALBUMIN SERPL BCP-MCNC: 2.9 G/DL (ref 3.5–5.2)
ALP SERPL-CCNC: 130 U/L (ref 55–135)
ALT SERPL W/O P-5'-P-CCNC: 24 U/L (ref 10–44)
ANION GAP SERPL CALC-SCNC: 13 MMOL/L (ref 8–16)
AST SERPL-CCNC: 56 U/L (ref 10–40)
BACTERIA #/AREA URNS HPF: ABNORMAL /HPF
BASOPHILS # BLD AUTO: 0.07 K/UL (ref 0–0.2)
BASOPHILS NFR BLD: 1.4 % (ref 0–1.9)
BILIRUB SERPL-MCNC: 0.2 MG/DL (ref 0.1–1)
BILIRUB UR QL STRIP: NEGATIVE
BUN SERPL-MCNC: 13 MG/DL (ref 6–20)
CALCIUM SERPL-MCNC: 8.2 MG/DL (ref 8.7–10.5)
CHLORIDE SERPL-SCNC: 111 MMOL/L (ref 95–110)
CLARITY UR: ABNORMAL
CO2 SERPL-SCNC: 21 MMOL/L (ref 23–29)
COLOR UR: YELLOW
CREAT SERPL-MCNC: 0.8 MG/DL (ref 0.5–1.4)
DIFFERENTIAL METHOD: ABNORMAL
EOSINOPHIL # BLD AUTO: 0.1 K/UL (ref 0–0.5)
EOSINOPHIL NFR BLD: 1 % (ref 0–8)
ERYTHROCYTE [DISTWIDTH] IN BLOOD BY AUTOMATED COUNT: 19.9 % (ref 11.5–14.5)
EST. GFR  (AFRICAN AMERICAN): >60 ML/MIN/1.73 M^2
EST. GFR  (NON AFRICAN AMERICAN): >60 ML/MIN/1.73 M^2
ETHANOL SERPL-MCNC: 297 MG/DL
GLUCOSE SERPL-MCNC: 83 MG/DL (ref 70–110)
GLUCOSE UR QL STRIP: NEGATIVE
HCT VFR BLD AUTO: 36.3 % (ref 37–48.5)
HGB BLD-MCNC: 11.2 G/DL (ref 12–16)
HGB UR QL STRIP: ABNORMAL
IMM GRANULOCYTES # BLD AUTO: 0.02 K/UL (ref 0–0.04)
IMM GRANULOCYTES NFR BLD AUTO: 0.4 % (ref 0–0.5)
KETONES UR QL STRIP: NEGATIVE
LEUKOCYTE ESTERASE UR QL STRIP: ABNORMAL
LYMPHOCYTES # BLD AUTO: 2.3 K/UL (ref 1–4.8)
LYMPHOCYTES NFR BLD: 45 % (ref 18–48)
MCH RBC QN AUTO: 27.2 PG (ref 27–31)
MCHC RBC AUTO-ENTMCNC: 30.9 G/DL (ref 32–36)
MCV RBC AUTO: 88 FL (ref 82–98)
MICROSCOPIC COMMENT: ABNORMAL
MONOCYTES # BLD AUTO: 0.3 K/UL (ref 0.3–1)
MONOCYTES NFR BLD: 5 % (ref 4–15)
NEUTROPHILS # BLD AUTO: 2.4 K/UL (ref 1.8–7.7)
NEUTROPHILS NFR BLD: 47.2 % (ref 38–73)
NITRITE UR QL STRIP: NEGATIVE
NRBC BLD-RTO: 0 /100 WBC
PH UR STRIP: 6 [PH] (ref 5–8)
PLATELET # BLD AUTO: 391 K/UL (ref 150–350)
PMV BLD AUTO: 10.2 FL (ref 9.2–12.9)
POTASSIUM SERPL-SCNC: 4.6 MMOL/L (ref 3.5–5.1)
PROT SERPL-MCNC: 5.8 G/DL (ref 6–8.4)
PROT UR QL STRIP: NEGATIVE
RBC # BLD AUTO: 4.12 M/UL (ref 4–5.4)
RBC #/AREA URNS HPF: 0 /HPF (ref 0–4)
SODIUM SERPL-SCNC: 145 MMOL/L (ref 136–145)
SP GR UR STRIP: 1 (ref 1–1.03)
SQUAMOUS #/AREA URNS HPF: ABNORMAL /HPF
URN SPEC COLLECT METH UR: ABNORMAL
UROBILINOGEN UR STRIP-ACNC: NEGATIVE EU/DL
WBC # BLD AUTO: 5.02 K/UL (ref 3.9–12.7)
WBC #/AREA URNS HPF: 75 /HPF (ref 0–5)

## 2020-07-05 PROCEDURE — 87088 URINE BACTERIA CULTURE: CPT

## 2020-07-05 PROCEDURE — 93010 EKG 12-LEAD: ICD-10-PCS | Mod: ,,, | Performed by: INTERNAL MEDICINE

## 2020-07-05 PROCEDURE — 93005 ELECTROCARDIOGRAM TRACING: CPT

## 2020-07-05 PROCEDURE — G0480 DRUG TEST DEF 1-7 CLASSES: HCPCS

## 2020-07-05 PROCEDURE — 81000 URINALYSIS NONAUTO W/SCOPE: CPT | Mod: 59

## 2020-07-05 PROCEDURE — 87077 CULTURE AEROBIC IDENTIFY: CPT | Mod: 59

## 2020-07-05 PROCEDURE — 87186 SC STD MICRODIL/AGAR DIL: CPT

## 2020-07-05 PROCEDURE — 87086 URINE CULTURE/COLONY COUNT: CPT

## 2020-07-05 PROCEDURE — 85025 COMPLETE CBC W/AUTO DIFF WBC: CPT

## 2020-07-05 PROCEDURE — 63600175 PHARM REV CODE 636 W HCPCS: Performed by: EMERGENCY MEDICINE

## 2020-07-05 PROCEDURE — 93010 ELECTROCARDIOGRAM REPORT: CPT | Mod: ,,, | Performed by: INTERNAL MEDICINE

## 2020-07-05 PROCEDURE — 25000003 PHARM REV CODE 250: Performed by: EMERGENCY MEDICINE

## 2020-07-05 PROCEDURE — 80320 DRUG SCREEN QUANTALCOHOLS: CPT

## 2020-07-05 PROCEDURE — 80053 COMPREHEN METABOLIC PANEL: CPT

## 2020-07-05 RX ORDER — ACETAMINOPHEN 500 MG
1000 TABLET ORAL
Status: COMPLETED | OUTPATIENT
Start: 2020-07-05 | End: 2020-07-05

## 2020-07-05 RX ADMIN — SODIUM CHLORIDE 500 ML: 0.9 INJECTION, SOLUTION INTRAVENOUS at 12:07

## 2020-07-05 RX ADMIN — ACETAMINOPHEN 1000 MG: 500 TABLET ORAL at 01:07

## 2020-07-05 RX ADMIN — THIAMINE HYDROCHLORIDE 100 MG: 100 INJECTION, SOLUTION INTRAMUSCULAR; INTRAVENOUS at 12:07

## 2020-07-05 NOTE — ED PROVIDER NOTES
"Encounter Date: 7/4/2020    SCRIBE #1 NOTE: I, Jay Jay Leslie, am scribing for, and in the presence of, Jeffery Pedroza MD.       History     Chief Complaint   Patient presents with    Fall     Patient slipped and fell in bathroom. Patient declines hitting her head, states she was stuck between the toilet and wall for 2 hours. patient reports generalized body aches      Alicia Chu is a 59 year old female who presents to the Emergency Department via EMS after a slip and fall 2 hours ago. The patient reports that she slipped and fell in the bathroom and could not get up, where she laid there for 2 hours on her right side. She reports generalized body aches, abdominal pain, and back pain. She states that she has also noticed leg swelling for the past two days. She reports being anemic. The patient states that she drink tonight for the first time in a while.     The history is provided by the patient. No  was used.     Review of patient's allergies indicates:   Allergen Reactions    Aspirin      Due to gastric bypass    Ibuprofen      Other reaction(s): Due To Gastric Bypass  Due to gastric bypass    Inderal [propranolol]      "Felt bizarre"     Past Medical History:   Diagnosis Date    Colon polyps     Gastric bypass status for obesity 2006    Hypertension     Postmenopausal HRT (hormone replacement therapy)     Ulcer      Past Surgical History:   Procedure Laterality Date    ABDOMINAL SURGERY      BREAST BIOPSY  2005    right    CHOLECYSTECTOMY      ESOPHAGOGASTRODUODENOSCOPY N/A 4/18/2019    Procedure: EGD (ESOPHAGOGASTRODUODENOSCOPY);  Surgeon: Mony Plunkett MD;  Location: [facility];  Service: Endoscopy;  Laterality: N/A;    GASTRIC BYPASS      zackary en Y    HERNIA REPAIR  x2    LI hernia    INFECTED SKIN DEBRIDEMENT  x3    KNEE ARTHROSCOPY W/ DEBRIDEMENT      right     Family History   Problem Relation Age of Onset    Cancer Mother         colon    Dementia Father     " Hypertension Father     Hyperlipidemia Father     Alzheimer's disease Father 78    Cancer Paternal Grandfather         colon cancer    Breast cancer Maternal Uncle      Social History     Tobacco Use    Smoking status: Never Smoker    Smokeless tobacco: Never Used   Substance Use Topics    Alcohol use: Yes     Alcohol/week: 0.0 standard drinks     Comment: occasional    Drug use: No     Review of Systems   Constitutional: Negative.    HENT: Negative.    Eyes: Negative.    Respiratory: Negative.    Cardiovascular: Positive for leg swelling.   Gastrointestinal: Positive for abdominal pain.   Genitourinary: Negative.    Musculoskeletal: Positive for back pain and myalgias.   Skin: Negative for wound.   Neurological: Negative.        Physical Exam     Initial Vitals [07/04/20 2359]   BP Pulse Resp Temp SpO2   120/70 68 19 98.9 °F (37.2 °C) 100 %      MAP       --         Physical Exam    Nursing note and vitals reviewed.  Constitutional: She appears well-developed and well-nourished. She is not diaphoretic. No distress.   HENT:   Head: Normocephalic.   Nose: Nose normal.   Small contusion on posterior left occipital lobe   Eyes: EOM are normal. Pupils are equal, round, and reactive to light.   Neck: Normal range of motion. Neck supple. No JVD present.   Cardiovascular: Normal rate, regular rhythm and normal heart sounds.   No murmur heard.  Pulmonary/Chest: Breath sounds normal. No stridor. No respiratory distress. She has no wheezes. She has no rales.   Abdominal: Soft. Bowel sounds are normal. She exhibits no distension. There is no abdominal tenderness.   Musculoskeletal: Normal range of motion. No tenderness or edema.   Neurological: She is alert and oriented to person, place, and time. No cranial nerve deficit.   Skin: Skin is warm and dry. Capillary refill takes less than 2 seconds. No rash noted. No erythema.         ED Course   Procedures  Labs Reviewed   CULTURE, URINE - Abnormal; Notable for the  following components:       Result Value    Urine Culture, Routine   (*)     Value: GRAM NEGATIVE MAYE  >100,000 cfu/ml  Identification and susceptibility pending      All other components within normal limits    Narrative:     Specimen Source->Urine   URINALYSIS, REFLEX TO URINE CULTURE - Abnormal; Notable for the following components:    Appearance, UA Cloudy (*)     Occult Blood UA 2+ (*)     Leukocytes, UA 3+ (*)     All other components within normal limits    Narrative:     Specimen Source->Urine   ALCOHOL,MEDICAL (ETHANOL) - Abnormal; Notable for the following components:    Alcohol, Medical, Serum 297 (*)     All other components within normal limits   CBC W/ AUTO DIFFERENTIAL - Abnormal; Notable for the following components:    Hemoglobin 11.2 (*)     Hematocrit 36.3 (*)     Mean Corpuscular Hemoglobin Conc 30.9 (*)     RDW 19.9 (*)     Platelets 391 (*)     All other components within normal limits   COMPREHENSIVE METABOLIC PANEL - Abnormal; Notable for the following components:    Chloride 111 (*)     CO2 21 (*)     Calcium 8.2 (*)     Total Protein 5.8 (*)     Albumin 2.9 (*)     AST 56 (*)     All other components within normal limits   URINALYSIS MICROSCOPIC - Abnormal; Notable for the following components:    WBC, UA 75 (*)     Bacteria Few (*)     All other components within normal limits    Narrative:     Specimen Source->Urine        ECG Results          EKG 12-lead (Final result)  Result time 07/06/20 14:01:17    Final result by Interface, Lab In [facility] (07/06/20 14:01:17)                 Narrative:    Test Reason : W19.XXXA,    Vent. Rate : 056 BPM     Atrial Rate : 056 BPM     P-R Int : 122 ms          QRS Dur : 084 ms      QT Int : 454 ms       P-R-T Axes : 065 078 066 degrees     QTc Int : 438 ms    Sinus bradycardia  Otherwise normal ECG  When compared with ECG of 26-OCT-2004 11:49,  Significant changes have occurred  Confirmed by Marcelo Fam MD ([id number]) on 7/6/2020 2:01:07 PM    Referred  By: [email or web address]   SELF           Confirmed By:Marcelo Fam MD                            Imaging Results          CT Head Without Contrast (Final result)  Result time 07/05/20 01:07:38    Final result by Maya Pantoja MD (07/05/20 01:07:38)                 Impression:      No acute intracranial abnormalities identified.      Electronically signed by: Maya Pantoja MD  Date:    07/05/2020  Time:    01:07             Narrative:    EXAMINATION:  CT HEAD WITHOUT CONTRAST    CLINICAL HISTORY:  Head trauma, minor, normal mental status (Age 19-64y);Syncope, simple, normal neuro exam;    TECHNIQUE:  Low dose axial images were obtained through the head.  Coronal and sagittal reformations were also performed. Contrast was not administered.    COMPARISON:  None.    FINDINGS:  No evidence of acute/recent major vascular distribution cerebral infarction, intraparenchymal hemorrhage, or intra-axial space occupying lesion. The ventricular system is normal in size and configuration with no evidence of hydrocephalus. No effacement of the skull-base cisterns. No abnormal extra-axial fluid collections or blood products. Visualized paranasal sinuses and mastoid air cells are clear. The calvarium shows no significant abnormality.                               X-Ray Pelvis Routine AP (Final result)  Result time 07/05/20 01:05:47    Final result by Maya Pantoja MD (07/05/20 01:05:47)                 Impression:      No acute osseous abnormality identified.      Electronically signed by: Maya Pantoja MD  Date:    07/05/2020  Time:    01:05             Narrative:    EXAMINATION:  XR PELVIS ROUTINE AP    CLINICAL HISTORY:  fall onto right side;    TECHNIQUE:  AP view of the pelvis was performed.    COMPARISON:  None.    FINDINGS:  No evidence of acute displaced fracture, dislocation, or osseous destructive process.  Mild degenerative changes are seen involving the bilateral hips.                               X-Ray Chest 1 View  (Final result)  Result time 07/05/20 01:05:01    Final result by Maya Pantoja MD (07/05/20 01:05:01)                 Impression:      No acute cardiopulmonary process identified.      Electronically signed by: Maya Pantoja MD  Date:    07/05/2020  Time:    01:05             Narrative:    EXAMINATION:  XR CHEST 1 VIEW    CLINICAL HISTORY:  Chest Pain;    TECHNIQUE:  Single frontal view of the chest was performed.    COMPARISON:  None    FINDINGS:  Cardiac silhouette is normal in size.  Lungs are symmetrically expanded.  No evidence of focal consolidative process, pneumothorax, or significant pleural effusion.  No acute osseous abnormality identified.                                            Scribe Attestation:   Scribe #1: I performed the above scribed service and the documentation accurately describes the services I performed. I attest to the accuracy of the note.      MDM:    59 year old female who presents to the Emergency Department via EMS after a slip and fall 2 hours ago.  Physical exam remarkable for mildly distressed appearing female, moving all extremities with ease, contusion to posterior left occipital scalp, no further traumatic injury noted, strength equal and symmetric, abdomen soft, nt/nd, CTAB, RRR.  ED workup remarkable for ETOH 297, CBC wnl, CMP wnl, CT head unremarkable, CXR unremarkable, Pelvic xray unremarkable, EKG - sinus bradycardia 56bpm, no ischemic pattern noted, no STEMI. Pt presentation consistent with fall, without significant traumatic injury noted, likely related to significant ETOH intake.  Pt without further neuro deficit noted, gait stable with vitals stable upon reassessment.  Pt with asxm bacteruria not contributing to fall today, will not treat at this time given lack of symptoms.  At this time given patient's history, physical exam, and ED workup do not suspect sepsis, CVA/TIA, Fracture, ICH, acute MI/ACS, PE, arrhythmia, or any further malignant cause.  Discussed  diagnosis and further treatment with patient, including f/u with PCP in the next week.  Return precautions given and all questions answered.  Patient in understanding of plan.  Pt discharged to home improved and stable.                        Clinical Impression:       ICD-10-CM ICD-9-CM   1. Alcoholic intoxication without complication  F10.920 305.00   2. Fall  W19.XXXA E888.9             ED Disposition Condition    Discharge Stable        ED Prescriptions     None        Follow-up Information     Follow up With Specialties Details Why Contact Info    [facility] Emergency Medicine Go to  If symptoms worsen [address]  [facility] [id number] [phone]                      I, Jeffery Pedroza M.D., personally performed the services described in this documentation. All medical record entries made by the scribe were at my direction and in my presence. I have reviewed the chart and agree that the record reflects my personal performance and is accurate and complete.       Jeffery Pedroza MD  07/06/20 2516

## 2020-07-08 LAB
BACTERIA UR CULT: ABNORMAL
BACTERIA UR CULT: ABNORMAL

## 2020-07-09 ENCOUNTER — TELEPHONE (OUTPATIENT)
Dept: EMERGENCY MEDICINE | Facility: HOSPITAL | Age: 60
End: 2020-07-09

## 2020-07-09 RX ORDER — NITROFURANTOIN 25; 75 MG/1; MG/1
100 CAPSULE ORAL 2 TIMES DAILY
Qty: 10 CAPSULE | Refills: 0 | Status: SHIPPED | OUTPATIENT
Start: 2020-07-09 | End: 2020-07-14

## 2020-07-13 ENCOUNTER — HOSPITAL ENCOUNTER (EMERGENCY)
Facility: HOSPITAL | Age: 60
Discharge: HOME | End: 2020-07-14
Attending: EMERGENCY MEDICINE
Payer: COMMERCIAL

## 2020-07-13 DIAGNOSIS — Z63.79 STRESSFUL LIFE EVENTS AFFECTING FAMILY AND HOUSEHOLD: ICD-10-CM

## 2020-07-13 DIAGNOSIS — R00.2 PALPITATIONS: Primary | ICD-10-CM

## 2020-07-13 DIAGNOSIS — F10.929 ALCOHOLIC INTOXICATION WITH COMPLICATION: ICD-10-CM

## 2020-07-13 DIAGNOSIS — F32.A DEPRESSION, UNSPECIFIED DEPRESSION TYPE: ICD-10-CM

## 2020-07-13 DIAGNOSIS — R53.83 FATIGUE, UNSPECIFIED TYPE: ICD-10-CM

## 2020-07-13 LAB
ALBUMIN SERPL BCP-MCNC: 3.1 G/DL (ref 3.5–5.2)
ALP SERPL-CCNC: 131 U/L (ref 55–135)
ALT SERPL W/O P-5'-P-CCNC: 33 U/L (ref 10–44)
AMPHET+METHAMPHET UR QL: NEGATIVE
ANION GAP SERPL CALC-SCNC: 12 MMOL/L (ref 8–16)
AST SERPL-CCNC: 75 U/L (ref 10–40)
BACTERIA #/AREA URNS HPF: NORMAL /HPF
BARBITURATES UR QL SCN>200 NG/ML: NEGATIVE
BASOPHILS # BLD AUTO: 0.05 K/UL (ref 0–0.2)
BASOPHILS NFR BLD: 0.8 % (ref 0–1.9)
BENZODIAZ UR QL SCN>200 NG/ML: NEGATIVE
BILIRUB SERPL-MCNC: 0.3 MG/DL (ref 0.1–1)
BILIRUB UR QL STRIP: NEGATIVE
BNP SERPL-MCNC: 69 PG/ML (ref 0–99)
BUN SERPL-MCNC: 18 MG/DL (ref 6–20)
BZE UR QL SCN: NEGATIVE
CALCIUM SERPL-MCNC: 8.4 MG/DL (ref 8.7–10.5)
CANNABINOIDS UR QL SCN: NEGATIVE
CHLORIDE SERPL-SCNC: 106 MMOL/L (ref 95–110)
CLARITY UR: CLEAR
CO2 SERPL-SCNC: 23 MMOL/L (ref 23–29)
COLOR UR: COLORLESS
CREAT SERPL-MCNC: 0.8 MG/DL (ref 0.5–1.4)
CREAT UR-MCNC: 9.9 MG/DL (ref 15–325)
DIFFERENTIAL METHOD: ABNORMAL
EOSINOPHIL # BLD AUTO: 0.1 K/UL (ref 0–0.5)
EOSINOPHIL NFR BLD: 1.9 % (ref 0–8)
ERYTHROCYTE [DISTWIDTH] IN BLOOD BY AUTOMATED COUNT: 18.6 % (ref 11.5–14.5)
EST. GFR  (AFRICAN AMERICAN): >60 ML/MIN/1.73 M^2
EST. GFR  (NON AFRICAN AMERICAN): >60 ML/MIN/1.73 M^2
ETHANOL SERPL-MCNC: 282 MG/DL
GLUCOSE SERPL-MCNC: 90 MG/DL (ref 70–110)
GLUCOSE UR QL STRIP: NEGATIVE
HCT VFR BLD AUTO: 36.9 % (ref 37–48.5)
HGB BLD-MCNC: 11.2 G/DL (ref 12–16)
HGB UR QL STRIP: NEGATIVE
IMM GRANULOCYTES # BLD AUTO: 0.02 K/UL (ref 0–0.04)
IMM GRANULOCYTES NFR BLD AUTO: 0.3 % (ref 0–0.5)
KETONES UR QL STRIP: NEGATIVE
LEUKOCYTE ESTERASE UR QL STRIP: ABNORMAL
LIPASE SERPL-CCNC: 14 U/L (ref 4–60)
LYMPHOCYTES # BLD AUTO: 1.9 K/UL (ref 1–4.8)
LYMPHOCYTES NFR BLD: 30.4 % (ref 18–48)
MCH RBC QN AUTO: 27.1 PG (ref 27–31)
MCHC RBC AUTO-ENTMCNC: 30.4 G/DL (ref 32–36)
MCV RBC AUTO: 89 FL (ref 82–98)
METHADONE UR QL SCN>300 NG/ML: NEGATIVE
MICROSCOPIC COMMENT: NORMAL
MONOCYTES # BLD AUTO: 0.4 K/UL (ref 0.3–1)
MONOCYTES NFR BLD: 6.9 % (ref 4–15)
NEUTROPHILS # BLD AUTO: 3.8 K/UL (ref 1.8–7.7)
NEUTROPHILS NFR BLD: 59.7 % (ref 38–73)
NITRITE UR QL STRIP: NEGATIVE
NRBC BLD-RTO: 0 /100 WBC
OPIATES UR QL SCN: NEGATIVE
PCP UR QL SCN>25 NG/ML: NEGATIVE
PH UR STRIP: 7 [PH] (ref 5–8)
PLATELET # BLD AUTO: 185 K/UL (ref 150–350)
PMV BLD AUTO: 10.7 FL (ref 9.2–12.9)
POTASSIUM SERPL-SCNC: 3.8 MMOL/L (ref 3.5–5.1)
PROT SERPL-MCNC: 6.1 G/DL (ref 6–8.4)
PROT UR QL STRIP: NEGATIVE
RBC # BLD AUTO: 4.13 M/UL (ref 4–5.4)
RBC #/AREA URNS HPF: 1 /HPF (ref 0–4)
SODIUM SERPL-SCNC: 141 MMOL/L (ref 136–145)
SP GR UR STRIP: 1 (ref 1–1.03)
TOXICOLOGY INFORMATION: ABNORMAL
TROPONIN I SERPL DL<=0.01 NG/ML-MCNC: <0.006 NG/ML (ref 0–0.03)
TROPONIN I SERPL DL<=0.01 NG/ML-MCNC: <0.006 NG/ML (ref 0–0.03)
TSH SERPL DL<=0.005 MIU/L-ACNC: 0.57 UIU/ML (ref 0.4–4)
URN SPEC COLLECT METH UR: ABNORMAL
UROBILINOGEN UR STRIP-ACNC: NEGATIVE EU/DL
WBC # BLD AUTO: 6.35 K/UL (ref 3.9–12.7)
WBC #/AREA URNS HPF: 1 /HPF (ref 0–5)

## 2020-07-13 PROCEDURE — 93005 ELECTROCARDIOGRAM TRACING: CPT

## 2020-07-13 PROCEDURE — 93010 EKG 12-LEAD: ICD-10-PCS | Mod: ,,, | Performed by: INTERNAL MEDICINE

## 2020-07-13 PROCEDURE — 85025 COMPLETE CBC W/AUTO DIFF WBC: CPT

## 2020-07-13 PROCEDURE — 84443 ASSAY THYROID STIM HORMONE: CPT

## 2020-07-13 PROCEDURE — 81000 URINALYSIS NONAUTO W/SCOPE: CPT | Mod: 59

## 2020-07-13 PROCEDURE — 93010 ELECTROCARDIOGRAM REPORT: CPT | Mod: ,,, | Performed by: INTERNAL MEDICINE

## 2020-07-13 PROCEDURE — 83880 ASSAY OF NATRIURETIC PEPTIDE: CPT

## 2020-07-13 PROCEDURE — G0480 DRUG TEST DEF 1-7 CLASSES: HCPCS

## 2020-07-13 PROCEDURE — 99285 EMERGENCY DEPT VISIT HI MDM: CPT | Mod: 25

## 2020-07-13 PROCEDURE — 80320 DRUG SCREEN QUANTALCOHOLS: CPT

## 2020-07-13 PROCEDURE — 83690 ASSAY OF LIPASE: CPT

## 2020-07-13 PROCEDURE — 84484 ASSAY OF TROPONIN QUANT: CPT

## 2020-07-13 PROCEDURE — 80307 DRUG TEST PRSMV CHEM ANLYZR: CPT

## 2020-07-13 PROCEDURE — 36000 PLACE NEEDLE IN VEIN: CPT

## 2020-07-13 PROCEDURE — 80053 COMPREHEN METABOLIC PANEL: CPT

## 2020-07-14 VITALS
HEIGHT: 66 IN | OXYGEN SATURATION: 100 % | TEMPERATURE: 98 F | RESPIRATION RATE: 16 BRPM | DIASTOLIC BLOOD PRESSURE: 79 MMHG | SYSTOLIC BLOOD PRESSURE: 123 MMHG | HEART RATE: 76 BPM | WEIGHT: 169 LBS | BODY MASS INDEX: 27.16 KG/M2

## 2020-07-14 NOTE — ED PROVIDER NOTES
"Encounter Date: 7/13/2020    SCRIBE #1 NOTE: I, Kaitlin Gilbert, am scribing for, and in the presence of,  Ines Vasquez MD. I have scribed the following portions of the note - Other sections scribed: HPI, ROS, PE, MDM.       History     Chief Complaint   Patient presents with    Palpitations     Pt arrives via EMS c/o palpitations and near syncope, +ETOH, AAOX4, NSR en route     59 y.o. female presents to ED via EMS s/p fall. Patient was drinking alcohol (she reports 3 drinks) and "missed the couch" and fell between her couch and a wall and was unable to get up.  is disabled and could not help her up, but did call EMS. Patient also reports that earlier today, after she got her  to PT -- which was an ordeal due to his inability to walk well -- she became tremulous, diaphoretic, and weak in the office, which improved with gatorade and a vending machine snack. On the way home, patient also felt "foggy." No chest pain.     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 She had been sober for 6 months, but drank on July 4th and also today. Patient is worried about her health insurance, her finances, her  for whom she is the sole caregiver, and the state of the world with COVID-19.     The history is provided by the patient.     Review of patient's allergies indicates:   Allergen Reactions    Aspirin      Due to gastric bypass    Ibuprofen      Other reaction(s): Due To Gastric Bypass  Due to gastric bypass    Inderal [propranolol]      "Felt bizarre"     Past Medical History:   Diagnosis Date    Colon polyps     Gastric bypass status for obesity 2006    Hypertension     Postmenopausal HRT (hormone replacement therapy)     Ulcer      Past Surgical History:   Procedure Laterality Date    ABDOMINAL SURGERY      BREAST BIOPSY  2005    right    CHOLECYSTECTOMY      ESOPHAGOGASTRODUODENOSCOPY N/A 4/18/2019    Procedure: EGD " (ESOPHAGOGASTRODUODENOSCOPY);  Surgeon: Mony Plunkett MD;  Location: [facility];  Service: Endoscopy;  Laterality: N/A;    GASTRIC BYPASS      zackary en Y    HERNIA REPAIR  x2    LI hernia    INFECTED SKIN DEBRIDEMENT  x3    KNEE ARTHROSCOPY W/ DEBRIDEMENT      right     Family History   Problem Relation Age of Onset    Cancer Mother         colon    Dementia Father     Hypertension Father     Hyperlipidemia Father     Alzheimer's disease Father 78    Cancer Paternal Grandfather         colon cancer    Breast cancer Maternal Uncle      Social History     Tobacco Use    Smoking status: Never Smoker    Smokeless tobacco: Never Used   Substance Use Topics    Alcohol use: Yes     Alcohol/week: 0.0 standard drinks     Comment: occasional    Drug use: No     Review of Systems   Constitutional: Positive for diaphoresis and fatigue.   Gastrointestinal: Positive for nausea.   Neurological: Positive for tremors.   Psychiatric/Behavioral: The patient is nervous/anxious.        Physical Exam     Initial Vitals [07/13/20 2031]   BP Pulse Resp Temp SpO2   (!) 156/86 64 18 97.9 °F (36.6 °C) 98 %      MAP       --         Physical Exam    Nursing note and vitals reviewed.  Constitutional: She appears well-developed and well-nourished. She is not diaphoretic.   Awake, alert, nontoxic, speaking in complete sentences.   HENT:   Head: Normocephalic and atraumatic.   Mouth/Throat: Oropharynx is clear and moist.   Eyes: Conjunctivae and EOM are normal. Pupils are equal, round, and reactive to light.   Neck: Normal range of motion. Neck supple.   Cardiovascular: Normal rate, regular rhythm, normal heart sounds and intact distal pulses.   No murmur heard.  Pulmonary/Chest: Breath sounds normal. No respiratory distress. She has no wheezes. She has no rhonchi. She has no rales.   Abdominal: Soft. There is no abdominal tenderness.   Musculoskeletal: Normal range of motion. No tenderness or edema.   Neurological: She is alert and  oriented to person, place, and time. She has normal strength.   Moving all extremities.   Skin: Skin is warm and dry. No erythema. No pallor.   Psychiatric:   Anxious, tearful.         ED Course   Procedures  Labs Reviewed   COMPREHENSIVE METABOLIC PANEL - Abnormal; Notable for the following components:       Result Value    Calcium 8.4 (*)     Albumin 3.1 (*)     AST 75 (*)     All other components within normal limits   CBC W/ AUTO DIFFERENTIAL - Abnormal; Notable for the following components:    Hemoglobin 11.2 (*)     Hematocrit 36.9 (*)     Mean Corpuscular Hemoglobin Conc 30.4 (*)     RDW 18.6 (*)     All other components within normal limits   URINALYSIS, REFLEX TO URINE CULTURE - Abnormal; Notable for the following components:    Color, UA Colorless (*)     Specific [place], UA 1.000 (*)     Leukocytes, UA Trace (*)     All other components within normal limits    Narrative:     Specimen Source->Urine   DRUG SCREEN PANEL, URINE EMERGENCY - Abnormal; Notable for the following components:    Creatinine, Random Ur 9.9 (*)     All other components within normal limits    Narrative:     Specimen Source->Urine   ALCOHOL,MEDICAL (ETHANOL) - Abnormal; Notable for the following components:    Alcohol, Medical, Serum 282 (*)     All other components within normal limits   B-TYPE NATRIURETIC PEPTIDE   TROPONIN I   TSH   LIPASE   URINALYSIS MICROSCOPIC    Narrative:     Specimen Source->Urine   TROPONIN I   DRUG SCREEN PANEL, URINE EMERGENCY   ALCOHOL,MEDICAL (ETHANOL)     EKG Readings: (Independently Interpreted)   20:41: NSR, HR 67. Normal axis. No ectopy. No STEMI.      ECG Results          EKG 12-lead (In process)  Result time 07/14/20 05:44:59    In process by Interface, Lab In [facility] (07/14/20 05:44:59)                 Narrative:    Test Reason : R00.2,    Vent. Rate : 067 BPM     Atrial Rate : 067 BPM     P-R Int : 156 ms          QRS Dur : 088 ms      QT Int : 460 ms       P-R-T Axes : 064 054 050 degrees      QTc Int : 486 ms    Normal sinus rhythm  Prolonged QT  Abnormal ECG  When compared with ECG of 05-JUL-2020 01:09,  Significant changes have occurred    Referred By: SUSAN   SELF           Confirmed By:                   In process by Interface, Lab In [facility] (07/14/20 05:43:34)                 Narrative:    Test Reason : R00.2,    Vent. Rate : 067 BPM     Atrial Rate : 067 BPM     P-R Int : 156 ms          QRS Dur : 088 ms      QT Int : 460 ms       P-R-T Axes : 064 054 050 degrees     QTc Int : 486 ms    Normal sinus rhythm  Prolonged QT  Abnormal ECG  When compared with ECG of 05-JUL-2020 01:09,  Significant changes have occurred    Referred By: AAAREFDANIEL   SELF           Confirmed By:                             Imaging Results          X-Ray Chest AP Portable (Final result)  Result time 07/13/20 21:27:30    Final result by Darrion Gonsalez MD (07/13/20 21:27:30)                 Impression:      No acute process.      Electronically signed by: Darrion Gonsalez MD  Date:    07/13/2020  Time:    21:27             Narrative:    EXAMINATION:  XR CHEST AP PORTABLE    CLINICAL HISTORY:  palpitations;    TECHNIQUE:  Single frontal view of the chest was performed.    COMPARISON:  07/05/2020.    FINDINGS:  Monitoring EKG leads are present.  The trachea is unremarkable.  The cardiomediastinal silhouette is within normal limits.  The hilar structures are unremarkable.  The hemidiaphragms are within normal limits.  There is no evidence of free air beneath the hemidiaphragms.  There are no pleural effusions.  There is no evidence of a pneumothorax.  There is no evidence of pneumomediastinum.  No airspace opacity is present.  The osseous structures are unremarkable.                              X-Rays:   Independently Interpreted Readings:   Other Readings:  CXR NAD    Medical Decision Making:   History:   Old Medical Records: I decided to obtain old medical records.  Old Records Summarized: records from previous  admission(s).  Initial Assessment:   59 y.o. female s/p fall. Tremulous, weak earlier today.  Differential Diagnosis:   Ddx includes occult trauma, ACS equivalent, dehydration, TONY, other.  Independently Interpreted Test(s):   I have ordered and independently interpreted X-rays - see prior notes.  I have ordered and independently interpreted EKG Reading(s) - see prior notes  Clinical Tests:   Lab Tests: Reviewed and Ordered  Radiological Study: Reviewed and Ordered  Medical Tests: Reviewed and Ordered  ED Management:  EKG no STEMI.    CXR NAD.    Labs reassuring aside from EtOH 282.    Patient denies SI/HI. She does not meet criteria for PEC. She reports she has an appointment with a psychiatrist in August (next month), and she intends to work on her drinking. D/c'ed via cab.             Scribe Attestation:   Scribe #1: I performed the above scribed service and the documentation accurately describes the services I performed. I attest to the accuracy of the note.                          Clinical Impression:     1. Palpitations    2. Alcoholic intoxication with complication    3. Fatigue, unspecified type    4. Depression, unspecified depression type    5. Stressful life events affecting family and household                ED Disposition Condition    Discharge Stable        ED Prescriptions     None        Follow-up Information     Follow up With Specialties Details Why Contact Info    Arun Kay MD Psychiatry   [address] [facility]  [address] [place], [place] [id number] [phone]                        I, Ines Vasquez, personally performed the services described in this documentation. All medical record entries made by the scribe were at my direction and in my presence. I have reviewed the chart and agree that the record reflects my personal performance and is accurate and complete.                 Ines Vasquez MD  07/14/20 8900

## 2020-08-03 ENCOUNTER — HOSPITAL ENCOUNTER (INPATIENT)
Facility: HOSPITAL | Age: 60
LOS: 3 days | Discharge: HOME | DRG: 378 | End: 2020-08-06
Attending: EMERGENCY MEDICINE | Admitting: EMERGENCY MEDICINE
Payer: MEDICAID

## 2020-08-03 DIAGNOSIS — K92.2 GI BLEED: Primary | ICD-10-CM

## 2020-08-03 DIAGNOSIS — F33.9 EPISODE OF RECURRENT MAJOR DEPRESSIVE DISORDER, UNSPECIFIED DEPRESSION EPISODE SEVERITY: ICD-10-CM

## 2020-08-03 DIAGNOSIS — F10.939 ALCOHOL WITHDRAWAL SYNDROME WITH COMPLICATION: ICD-10-CM

## 2020-08-03 DIAGNOSIS — R07.9 CHEST PAIN: ICD-10-CM

## 2020-08-03 DIAGNOSIS — N39.0 URINARY TRACT INFECTION WITHOUT HEMATURIA, SITE UNSPECIFIED: ICD-10-CM

## 2020-08-03 DIAGNOSIS — K25.7 CHRONIC GASTRIC ULCER, UNSPECIFIED WHETHER GASTRIC ULCER HEMORRHAGE OR PERFORATION PRESENT: ICD-10-CM

## 2020-08-03 PROBLEM — F10.10 ALCOHOL ABUSE: Status: ACTIVE | Noted: 2020-08-03

## 2020-08-03 PROBLEM — K27.9 PUD (PEPTIC ULCER DISEASE): Status: ACTIVE | Noted: 2020-08-03

## 2020-08-03 PROBLEM — R74.01 TRANSAMINITIS: Status: ACTIVE | Noted: 2020-08-03

## 2020-08-03 PROBLEM — E80.6 HYPERBILIRUBINEMIA: Status: ACTIVE | Noted: 2020-08-03

## 2020-08-03 LAB
ABO + RH BLD: NORMAL
ALBUMIN SERPL BCP-MCNC: 3.7 G/DL (ref 3.5–5.2)
ALP SERPL-CCNC: 287 U/L (ref 55–135)
ALT SERPL W/O P-5'-P-CCNC: 96 U/L (ref 10–44)
AMPHET+METHAMPHET UR QL: NEGATIVE
ANION GAP SERPL CALC-SCNC: 25 MMOL/L (ref 8–16)
ANISOCYTOSIS BLD QL SMEAR: SLIGHT
AST SERPL-CCNC: 221 U/L (ref 10–40)
BACTERIA #/AREA URNS HPF: ABNORMAL /HPF
BARBITURATES UR QL SCN>200 NG/ML: NEGATIVE
BASOPHILS # BLD AUTO: 0.04 K/UL (ref 0–0.2)
BASOPHILS # BLD AUTO: 0.04 K/UL (ref 0–0.2)
BASOPHILS NFR BLD: 0.9 % (ref 0–1.9)
BASOPHILS NFR BLD: 0.9 % (ref 0–1.9)
BENZODIAZ UR QL SCN>200 NG/ML: NEGATIVE
BILIRUB DIRECT SERPL-MCNC: 1 MG/DL (ref 0.1–0.3)
BILIRUB SERPL-MCNC: 2.7 MG/DL (ref 0.1–1)
BILIRUB UR QL STRIP: NEGATIVE
BLD GP AB SCN CELLS X3 SERPL QL: NORMAL
BUN SERPL-MCNC: 12 MG/DL (ref 6–20)
BZE UR QL SCN: NEGATIVE
CALCIUM SERPL-MCNC: 9 MG/DL (ref 8.7–10.5)
CANNABINOIDS UR QL SCN: NEGATIVE
CHLORIDE SERPL-SCNC: 94 MMOL/L (ref 95–110)
CLARITY UR: CLEAR
CO2 SERPL-SCNC: 20 MMOL/L (ref 23–29)
COLOR UR: YELLOW
CREAT SERPL-MCNC: 0.9 MG/DL (ref 0.5–1.4)
CREAT UR-MCNC: 27.2 MG/DL (ref 15–325)
DIFFERENTIAL METHOD: ABNORMAL
DIFFERENTIAL METHOD: ABNORMAL
EOSINOPHIL # BLD AUTO: 0 K/UL (ref 0–0.5)
EOSINOPHIL # BLD AUTO: 0.1 K/UL (ref 0–0.5)
EOSINOPHIL NFR BLD: 0.7 % (ref 0–8)
EOSINOPHIL NFR BLD: 1.4 % (ref 0–8)
ERYTHROCYTE [DISTWIDTH] IN BLOOD BY AUTOMATED COUNT: 19.9 % (ref 11.5–14.5)
ERYTHROCYTE [DISTWIDTH] IN BLOOD BY AUTOMATED COUNT: 20.1 % (ref 11.5–14.5)
EST. GFR  (AFRICAN AMERICAN): >60 ML/MIN/1.73 M^2
EST. GFR  (NON AFRICAN AMERICAN): >60 ML/MIN/1.73 M^2
ETHANOL SERPL-MCNC: <10 MG/DL
GLUCOSE SERPL-MCNC: 173 MG/DL (ref 70–110)
GLUCOSE UR QL STRIP: NEGATIVE
HCT VFR BLD AUTO: 35.7 % (ref 37–48.5)
HCT VFR BLD AUTO: 36.9 % (ref 37–48.5)
HGB BLD-MCNC: 11.2 G/DL (ref 12–16)
HGB BLD-MCNC: 11.8 G/DL (ref 12–16)
HGB UR QL STRIP: ABNORMAL
HYALINE CASTS #/AREA URNS LPF: 0 /LPF
IMM GRANULOCYTES # BLD AUTO: 0.01 K/UL (ref 0–0.04)
IMM GRANULOCYTES # BLD AUTO: 0.04 K/UL (ref 0–0.04)
IMM GRANULOCYTES NFR BLD AUTO: 0.2 % (ref 0–0.5)
IMM GRANULOCYTES NFR BLD AUTO: 0.9 % (ref 0–0.5)
INR PPP: 1 (ref 0.8–1.2)
KETONES UR QL STRIP: ABNORMAL
LEUKOCYTE ESTERASE UR QL STRIP: ABNORMAL
LIPASE SERPL-CCNC: 28 U/L (ref 4–60)
LYMPHOCYTES # BLD AUTO: 1 K/UL (ref 1–4.8)
LYMPHOCYTES # BLD AUTO: 1.2 K/UL (ref 1–4.8)
LYMPHOCYTES NFR BLD: 21.6 % (ref 18–48)
LYMPHOCYTES NFR BLD: 27.8 % (ref 18–48)
MCH RBC QN AUTO: 28.2 PG (ref 27–31)
MCH RBC QN AUTO: 28.8 PG (ref 27–31)
MCHC RBC AUTO-ENTMCNC: 31.4 G/DL (ref 32–36)
MCHC RBC AUTO-ENTMCNC: 32 G/DL (ref 32–36)
MCV RBC AUTO: 90 FL (ref 82–98)
MCV RBC AUTO: 90 FL (ref 82–98)
METHADONE UR QL SCN>300 NG/ML: NEGATIVE
MICROSCOPIC COMMENT: ABNORMAL
MONOCYTES # BLD AUTO: 0.5 K/UL (ref 0.3–1)
MONOCYTES # BLD AUTO: 0.6 K/UL (ref 0.3–1)
MONOCYTES NFR BLD: 13.9 % (ref 4–15)
MONOCYTES NFR BLD: 9.9 % (ref 4–15)
NEUTROPHILS # BLD AUTO: 2.4 K/UL (ref 1.8–7.7)
NEUTROPHILS # BLD AUTO: 3 K/UL (ref 1.8–7.7)
NEUTROPHILS NFR BLD: 55.8 % (ref 38–73)
NEUTROPHILS NFR BLD: 66 % (ref 38–73)
NITRITE UR QL STRIP: NEGATIVE
NRBC BLD-RTO: 0 /100 WBC
NRBC BLD-RTO: 0 /100 WBC
OPIATES UR QL SCN: NEGATIVE
PCP UR QL SCN>25 NG/ML: NEGATIVE
PH UR STRIP: 7 [PH] (ref 5–8)
PLATELET # BLD AUTO: 159 K/UL (ref 150–350)
PLATELET # BLD AUTO: 211 K/UL (ref 150–350)
PMV BLD AUTO: 10.4 FL (ref 9.2–12.9)
PMV BLD AUTO: 10.5 FL (ref 9.2–12.9)
POTASSIUM SERPL-SCNC: 4.3 MMOL/L (ref 3.5–5.1)
PROT SERPL-MCNC: 6.9 G/DL (ref 6–8.4)
PROT UR QL STRIP: ABNORMAL
PROTHROMBIN TIME: 10.7 SEC (ref 9–12.5)
RBC # BLD AUTO: 3.97 M/UL (ref 4–5.4)
RBC # BLD AUTO: 4.1 M/UL (ref 4–5.4)
RBC #/AREA URNS HPF: 20 /HPF (ref 0–4)
SARS-COV-2 RDRP RESP QL NAA+PROBE: NEGATIVE
SODIUM SERPL-SCNC: 139 MMOL/L (ref 136–145)
SP GR UR STRIP: 1.01 (ref 1–1.03)
SQUAMOUS #/AREA URNS HPF: 1 /HPF
STOMATOCYTES BLD QL SMEAR: PRESENT
TOXICOLOGY INFORMATION: NORMAL
URN SPEC COLLECT METH UR: ABNORMAL
UROBILINOGEN UR STRIP-ACNC: ABNORMAL EU/DL
WBC # BLD AUTO: 4.24 K/UL (ref 3.9–12.7)
WBC # BLD AUTO: 4.54 K/UL (ref 3.9–12.7)
WBC #/AREA URNS HPF: 18 /HPF (ref 0–5)
WBC CLUMPS URNS QL MICRO: ABNORMAL

## 2020-08-03 PROCEDURE — 80053 COMPREHEN METABOLIC PANEL: CPT

## 2020-08-03 PROCEDURE — 25000003 PHARM REV CODE 250: Performed by: HOSPITALIST

## 2020-08-03 PROCEDURE — C9113 INJ PANTOPRAZOLE SODIUM, VIA: HCPCS | Performed by: HOSPITALIST

## 2020-08-03 PROCEDURE — 87088 URINE BACTERIA CULTURE: CPT

## 2020-08-03 PROCEDURE — 63600175 PHARM REV CODE 636 W HCPCS: Performed by: HOSPITALIST

## 2020-08-03 PROCEDURE — 36415 COLL VENOUS BLD VENIPUNCTURE: CPT

## 2020-08-03 PROCEDURE — 96375 TX/PRO/DX INJ NEW DRUG ADDON: CPT

## 2020-08-03 PROCEDURE — 85025 COMPLETE CBC W/AUTO DIFF WBC: CPT

## 2020-08-03 PROCEDURE — 83690 ASSAY OF LIPASE: CPT

## 2020-08-03 PROCEDURE — 87077 CULTURE AEROBIC IDENTIFY: CPT

## 2020-08-03 PROCEDURE — 80307 DRUG TEST PRSMV CHEM ANLYZR: CPT

## 2020-08-03 PROCEDURE — 96376 TX/PRO/DX INJ SAME DRUG ADON: CPT

## 2020-08-03 PROCEDURE — 21400001 HC TELEMETRY ROOM

## 2020-08-03 PROCEDURE — 36410 VNPNXR 3YR/> PHY/QHP DX/THER: CPT | Mod: 59

## 2020-08-03 PROCEDURE — 96365 THER/PROPH/DIAG IV INF INIT: CPT

## 2020-08-03 PROCEDURE — 85610 PROTHROMBIN TIME: CPT

## 2020-08-03 PROCEDURE — 82248 BILIRUBIN DIRECT: CPT

## 2020-08-03 PROCEDURE — 87186 SC STD MICRODIL/AGAR DIL: CPT

## 2020-08-03 PROCEDURE — 87086 URINE CULTURE/COLONY COUNT: CPT

## 2020-08-03 PROCEDURE — 93010 EKG 12-LEAD: ICD-10-PCS | Mod: ,,, | Performed by: INTERNAL MEDICINE

## 2020-08-03 PROCEDURE — 63600175 PHARM REV CODE 636 W HCPCS: Performed by: EMERGENCY MEDICINE

## 2020-08-03 PROCEDURE — 81000 URINALYSIS NONAUTO W/SCOPE: CPT | Mod: 59

## 2020-08-03 PROCEDURE — 93005 ELECTROCARDIOGRAM TRACING: CPT

## 2020-08-03 PROCEDURE — C9113 INJ PANTOPRAZOLE SODIUM, VIA: HCPCS | Performed by: EMERGENCY MEDICINE

## 2020-08-03 PROCEDURE — 80320 DRUG SCREEN QUANTALCOHOLS: CPT

## 2020-08-03 PROCEDURE — 93010 ELECTROCARDIOGRAM REPORT: CPT | Mod: ,,, | Performed by: INTERNAL MEDICINE

## 2020-08-03 PROCEDURE — 99291 CRITICAL CARE FIRST HOUR: CPT | Mod: 25

## 2020-08-03 PROCEDURE — 86850 RBC ANTIBODY SCREEN: CPT

## 2020-08-03 PROCEDURE — 25000003 PHARM REV CODE 250: Performed by: EMERGENCY MEDICINE

## 2020-08-03 PROCEDURE — U0002 COVID-19 LAB TEST NON-CDC: HCPCS

## 2020-08-03 RX ORDER — PANTOPRAZOLE SODIUM 40 MG/10ML
40 INJECTION, POWDER, LYOPHILIZED, FOR SOLUTION INTRAVENOUS 2 TIMES DAILY
Status: DISCONTINUED | OUTPATIENT
Start: 2020-08-03 | End: 2020-08-06

## 2020-08-03 RX ORDER — LORAZEPAM 2 MG/ML
1 INJECTION INTRAMUSCULAR
Status: COMPLETED | OUTPATIENT
Start: 2020-08-03 | End: 2020-08-03

## 2020-08-03 RX ORDER — IBUPROFEN 200 MG
24 TABLET ORAL
Status: DISCONTINUED | OUTPATIENT
Start: 2020-08-03 | End: 2020-08-06 | Stop reason: HOSPADM

## 2020-08-03 RX ORDER — DIAZEPAM 5 MG/1
5 TABLET ORAL EVERY 6 HOURS PRN
Status: DISCONTINUED | OUTPATIENT
Start: 2020-08-03 | End: 2020-08-06 | Stop reason: HOSPADM

## 2020-08-03 RX ORDER — LORAZEPAM 2 MG/ML
2 INJECTION INTRAMUSCULAR
Status: COMPLETED | OUTPATIENT
Start: 2020-08-03 | End: 2020-08-03

## 2020-08-03 RX ORDER — ACETAMINOPHEN 325 MG/1
650 TABLET ORAL EVERY 8 HOURS PRN
Status: DISCONTINUED | OUTPATIENT
Start: 2020-08-03 | End: 2020-08-06 | Stop reason: HOSPADM

## 2020-08-03 RX ORDER — PANTOPRAZOLE SODIUM 40 MG/10ML
80 INJECTION, POWDER, LYOPHILIZED, FOR SOLUTION INTRAVENOUS
Status: COMPLETED | OUTPATIENT
Start: 2020-08-03 | End: 2020-08-03

## 2020-08-03 RX ORDER — DIAZEPAM 5 MG/1
5 TABLET ORAL EVERY 8 HOURS
Status: DISCONTINUED | OUTPATIENT
Start: 2020-08-03 | End: 2020-08-04

## 2020-08-03 RX ORDER — ONDANSETRON 8 MG/1
8 TABLET, ORALLY DISINTEGRATING ORAL EVERY 8 HOURS PRN
Status: DISCONTINUED | OUTPATIENT
Start: 2020-08-03 | End: 2020-08-06 | Stop reason: HOSPADM

## 2020-08-03 RX ORDER — ONDANSETRON 2 MG/ML
4 INJECTION INTRAMUSCULAR; INTRAVENOUS EVERY 8 HOURS PRN
Status: DISCONTINUED | OUTPATIENT
Start: 2020-08-03 | End: 2020-08-06 | Stop reason: HOSPADM

## 2020-08-03 RX ORDER — PANTOPRAZOLE SODIUM 40 MG/10ML
80 INJECTION, POWDER, LYOPHILIZED, FOR SOLUTION INTRAVENOUS ONCE
Status: DISCONTINUED | OUTPATIENT
Start: 2020-08-03 | End: 2020-08-03

## 2020-08-03 RX ORDER — GLUCAGON 1 MG
1 KIT INJECTION
Status: DISCONTINUED | OUTPATIENT
Start: 2020-08-03 | End: 2020-08-06 | Stop reason: HOSPADM

## 2020-08-03 RX ORDER — ACETAMINOPHEN 325 MG/1
650 TABLET ORAL EVERY 8 HOURS PRN
Status: DISCONTINUED | OUTPATIENT
Start: 2020-08-03 | End: 2020-08-03

## 2020-08-03 RX ORDER — ACETAMINOPHEN 325 MG/1
650 TABLET ORAL EVERY 4 HOURS PRN
Status: DISCONTINUED | OUTPATIENT
Start: 2020-08-03 | End: 2020-08-03

## 2020-08-03 RX ORDER — FLUOXETINE HYDROCHLORIDE 20 MG/1
40 CAPSULE ORAL DAILY
Status: DISCONTINUED | OUTPATIENT
Start: 2020-08-04 | End: 2020-08-06 | Stop reason: HOSPADM

## 2020-08-03 RX ORDER — TALC
6 POWDER (GRAM) TOPICAL NIGHTLY PRN
Status: DISCONTINUED | OUTPATIENT
Start: 2020-08-03 | End: 2020-08-06 | Stop reason: HOSPADM

## 2020-08-03 RX ORDER — IBUPROFEN 200 MG
16 TABLET ORAL
Status: DISCONTINUED | OUTPATIENT
Start: 2020-08-03 | End: 2020-08-06 | Stop reason: HOSPADM

## 2020-08-03 RX ORDER — HYDRALAZINE HYDROCHLORIDE 20 MG/ML
15 INJECTION INTRAMUSCULAR; INTRAVENOUS EVERY 4 HOURS PRN
Status: DISCONTINUED | OUTPATIENT
Start: 2020-08-03 | End: 2020-08-06 | Stop reason: HOSPADM

## 2020-08-03 RX ORDER — SODIUM CHLORIDE 9 MG/ML
INJECTION, SOLUTION INTRAVENOUS CONTINUOUS
Status: DISCONTINUED | OUTPATIENT
Start: 2020-08-03 | End: 2020-08-05

## 2020-08-03 RX ADMIN — ONDANSETRON 4 MG: 2 INJECTION INTRAMUSCULAR; INTRAVENOUS at 10:08

## 2020-08-03 RX ADMIN — SODIUM CHLORIDE: 0.9 INJECTION, SOLUTION INTRAVENOUS at 04:08

## 2020-08-03 RX ADMIN — LORAZEPAM 2 MG: 2 INJECTION INTRAMUSCULAR; INTRAVENOUS at 03:08

## 2020-08-03 RX ADMIN — PANTOPRAZOLE SODIUM 40 MG: 40 INJECTION, POWDER, FOR SOLUTION INTRAVENOUS at 04:08

## 2020-08-03 RX ADMIN — PANTOPRAZOLE SODIUM 80 MG: 40 INJECTION, POWDER, FOR SOLUTION INTRAVENOUS at 11:08

## 2020-08-03 RX ADMIN — ONDANSETRON 8 MG: 8 TABLET, ORALLY DISINTEGRATING ORAL at 08:08

## 2020-08-03 RX ADMIN — DIAZEPAM 5 MG: 5 TABLET ORAL at 10:08

## 2020-08-03 RX ADMIN — CEFTRIAXONE 1 G: 1 INJECTION, SOLUTION INTRAVENOUS at 11:08

## 2020-08-03 RX ADMIN — DEXTROSE 8 MG/HR: 50 INJECTION, SOLUTION INTRAVENOUS at 01:08

## 2020-08-03 RX ADMIN — SODIUM CHLORIDE 1000 ML: 0.9 INJECTION, SOLUTION INTRAVENOUS at 11:08

## 2020-08-03 RX ADMIN — LORAZEPAM 1 MG: 2 INJECTION INTRAMUSCULAR; INTRAVENOUS at 11:08

## 2020-08-03 NOTE — H&P
[facility] Hospital Medicine  History & Physical    Patient Name: Alicia Chu  MRN: [id number]  Admission Date: 8/3/2020  Attending Physician: Franco Valdez MD  Primary Care Provider: Primary Doctor No         Patient information was obtained from patient, past medical records and ER records.     Subjective:     Principal Problem:Acute upper GI bleeding    Chief Complaint:   Chief Complaint   Patient presents with    Nausea     nausea w/ blood in stool        HPI: Mrs. Chu is a 59 year old woman with history of peptic ulcer disease, gastric bypass in 2006 and alcoholism who presented for evaluation of abdominal pain, nausea and black stool.  She notes that the black stool was first noticed two days ago.  Over the last 24 hours the stool has become thick and tarry.  She has had six black tarry stools in the last 24 hours.  She notes this is associated with abdominal pain that started 4 days ago.  The pain is burning in sensation and located in her mid upper and lower abdomen.  It is non-radiating.  She reports associated cough and nausea for the last two days as well.  She does not take NSAIDs but is an alcoholic.  After 10 months of sobriety she relapsed in April of this year.  Currently she is drinking one small bottle of wine (10oz) and four strong vodka cocktails daily.  Her last drink was last night at 9PM.  She states she has not gone this long with alcohol since April and feels very anxious and tremulous.  She has had alcohol withdrawal before, but she states it has never been this bad.  She denies fevers, chills, vomiting, constipation, diarrhea, chest pain, numbness, tingling or focal weakness.    Past Medical History:   Diagnosis Date    Colon polyps     Gastric bypass status for obesity 2006    Hypertension     MDD (major depressive disorder)     Panic attack     Postmenopausal HRT (hormone replacement therapy)     Ulcer        Past Surgical History:   Procedure Laterality  "Date    ABDOMINAL SURGERY      BREAST BIOPSY  2005    right    CHOLECYSTECTOMY      ESOPHAGOGASTRODUODENOSCOPY N/A 4/18/2019    Procedure: EGD (ESOPHAGOGASTRODUODENOSCOPY);  Surgeon: Mony Plunkett MD;  [facility];  Service: Endoscopy;  Laterality: N/A;    GASTRIC BYPASS      zackary en Y    HERNIA REPAIR  x2    LI hernia    HERNIA REPAIR      INFECTED SKIN DEBRIDEMENT  x3    KNEE ARTHROSCOPY W/ DEBRIDEMENT      right       Review of patient's allergies indicates:   Allergen Reactions    Aspirin      Due to gastric bypass    Ibuprofen      Other reaction(s): Due To Gastric Bypass  Due to gastric bypass    Inderal [propranolol]      "Felt bizarre"       No current facility-administered medications on file prior to encounter.      Current Outpatient Medications on File Prior to Encounter   Medication Sig    FLUoxetine 20 MG capsule Take 1 capsule (20 mg total) by mouth every evening. Also taking Fluoxetine 40 mg QAM    FLUoxetine 40 MG capsule Take 1 capsule (40 mg total) by mouth once daily. Also taking fluoxetine 20 mg QPM    hydrOXYzine pamoate (VISTARIL) 25 MG Cap     pantoprazole (PROTONIX) 40 MG tablet Take 1 tablet PO BID x 2 weeks, then 1 tablet PO QD    trazodone HCl (TRAZODONE ORAL)      Family History     Problem Relation (Age of Onset)    Alzheimer's disease Father (78)    Breast cancer Maternal Uncle    Cancer Mother, Paternal Grandfather    Dementia Father    Hyperlipidemia Father    Hypertension Father        Tobacco Use    Smoking status: Never Smoker    Smokeless tobacco: Never Used   Substance and Sexual Activity    Alcohol use: Yes     Alcohol/week: 0.0 standard drinks     Comment: 1 small bottle wine daily (10oz) + 4 strong vodka cocktails daily    Drug use: No    Sexual activity: Yes     Partners: Male     Birth control/protection: None     Review of Systems   Constitutional: Positive for activity change. Negative for chills, diaphoresis, fatigue and fever.   HENT: " Negative for congestion, drooling and hearing loss.    Eyes: Negative for discharge.   Respiratory: Positive for cough. Negative for apnea, chest tightness, shortness of breath and wheezing.    Cardiovascular: Negative for chest pain, palpitations and leg swelling.   Gastrointestinal: Positive for abdominal pain, blood in stool and nausea. Negative for abdominal distention, constipation, diarrhea, rectal pain and vomiting.   Endocrine: Negative for cold intolerance and heat intolerance.   Genitourinary: Negative for difficulty urinating and dysuria.   Musculoskeletal: Negative for arthralgias and gait problem.   Skin: Negative for rash.   Neurological: Positive for tremors, weakness and light-headedness. Negative for seizures and numbness.   Hematological: Negative for adenopathy.   Psychiatric/Behavioral: Negative for agitation and behavioral problems.     Objective:     Vital Signs (Most Recent):  Temp: 97.9 °F (36.6 °C) (08/03/20 1054)  Pulse: 101 (08/03/20 1302)  Resp: (!) 26 (08/03/20 1246)  BP: (!) 151/88 (08/03/20 1502)  SpO2: 98 % (08/03/20 1502) Vital Signs (24h Range):  Temp:  [97.9 °F (36.6 °C)] 97.9 °F (36.6 °C)  Pulse:  [] 101  Resp:  [16-26] 26  SpO2:  [95 %-100 %] 98 %  BP: (151-174)/(87-99) 151/88     Weight: 74.8 kg (165 lb)  Body mass index is 27.46 kg/m².    Physical Exam  Vitals signs reviewed.   Constitutional:       General: She is not in acute distress.     Appearance: She is well-developed. She is diaphoretic. She is not ill-appearing or toxic-appearing.   HENT:      Head: Normocephalic and atraumatic.      Mouth/Throat:      Mouth: Mucous membranes are dry.   Eyes:      Pupils: Pupils are equal, round, and reactive to light.   Neck:      Musculoskeletal: Normal range of motion and neck supple.   Cardiovascular:      Rate and Rhythm: Regular rhythm. Tachycardia present.      Heart sounds: Murmur present.   Pulmonary:      Effort: Pulmonary effort is normal. No respiratory distress.       Breath sounds: Normal breath sounds.   Abdominal:      Comments: Soft, non-distended, mild ttp in mid lower abdomen, no rebound or guarding, strong bowel sounds.   Musculoskeletal: Normal range of motion.   Skin:     General: Skin is warm.      Coloration: Skin is not jaundiced.   Neurological:      Mental Status: She is alert and oriented to person, place, and time.      Cranial Nerves: No cranial nerve deficit.      Coordination: Coordination normal.      Comments: Alert and oriented x 3, normal strength and no focal weakness, mild fine tremors.   Psychiatric:         Behavior: Behavior normal.           CRANIAL NERVES     CN III, IV, VI   Pupils are equal, round, and reactive to light.       Significant Labs: All pertinent labs within the past 24 hours have been reviewed.    Significant Imaging: I have reviewed and interpreted all pertinent imaging results/findings within the past 24 hours.    Assessment/Plan:     * Acute upper GI bleeding  -Mrs. Chu is admitted to inpatient status  -She has melanic stool for two days which is heme positive.  -Noted history of prior gastric bypass, peptic ulcer disease and GI bleeding.  Also is at risk for alcoholic gastritis or gastropathy.  -Not anemic on admit, but appears hemoconcentrated likely from dehydration.  Anticipate repeat labs will show anemia.  -Will monitor cbc q8h and transfuse for Hb > 7.  She was consented in the ER.  -Will treat with IV fluids and bid IV protonix.  -GI has been consulted and plan for EGD in AM.  -Will allow clear liquids now and NPO after midnight.      PUD (peptic ulcer disease)  -Treatment as above.      History of Keyshawn-en-Y gastric bypass  -History noted.      Alcohol abuse  -Was sober after rehab for 10 months but relapsed in April  -Has been drinking a 10oz bottle of wine and 4 strong vodka cocktails daily per patient  -Counseled on need to abstain from alcohol.    -On admit she is tachycardic, diaphoretic, tremulous and anxious.   Her CIWA-ar score is 12 and she is having alcohol withdrawal.  -Will monitor CIWA q4h  -Will treat with banana bag daily.  -Will start valium 5mg po q8h and titrate as needed.    Transaminitis  -Likely related to alcohol consumption based on ratio of ast/alt  -Repeat labs in AM      Hyperbilirubinemia  -This is new  -Will order direct bilirubin  -If not improved tomorrow will consider imaging.      Hypertension  -Not on treatment at home.  -Hypertensive in ER likely due to alcohol withdrawal.  -Will provide prn hydralazine.        VTE Risk Mitigation (From admission, onward)    scds             Franco Valdez MD  Department of Hospital Medicine   [facility]

## 2020-08-03 NOTE — ASSESSMENT & PLAN NOTE
-Was sober after rehab for 10 months but relapsed in April  -Has been drinking a 10oz bottle of wine and 4 strong vodka cocktails daily per patient  -Counseled on need to abstain from alcohol.    -On admit she is tachycardic, diaphoretic, tremulous and anxious.  Her CIWA-ar score is 12 and she is having alcohol withdrawal.  -Will monitor CIWA q4h  -Will treat with banana bag daily.  -Will start valium 5mg po q8h and titrate as needed.

## 2020-08-03 NOTE — NURSING
Patient arrived to room at 1555, alert and oriented x 4.  Patient voiced no complaint of pain or discomfort at this time.

## 2020-08-03 NOTE — PLAN OF CARE
Problem: Fall Injury Risk  Goal: Absence of Fall and Fall-Related Injury  Outcome: Ongoing, Progressing     Problem: Adjustment to Illness (Gastrointestinal Bleeding)  Goal: Optimal Coping with Acute Illness  Outcome: Ongoing, Progressing     Problem: Bleeding (Gastrointestinal Bleeding)  Goal: Hemostasis  Outcome: Ongoing, Progressing     Problem: Adult Inpatient Plan of Care  Goal: Plan of Care Review  Outcome: Ongoing, Progressing  Goal: Patient-Specific Goal (Individualization)  Outcome: Ongoing, Progressing  Goal: Absence of Hospital-Acquired Illness or Injury  Outcome: Ongoing, Progressing  Goal: Optimal Comfort and Wellbeing  Outcome: Ongoing, Progressing  Goal: Readiness for Transition of Care  Outcome: Ongoing, Progressing  Goal: Rounds/Family Conference  Outcome: Ongoing, Progressing     Problem: Adult Behavioral Health Plan of Care  Goal: Plan of Care Review  Outcome: Ongoing, Progressing  Goal: Patient-Specific Goal (Individualization)  Outcome: Ongoing, Progressing  Goal: Adheres to Safety Considerations for Self and Others  Outcome: Ongoing, Progressing  Goal: Optimized Coping Skills in Response to Life Stressors  Outcome: Ongoing, Progressing  Goal: Develops/Participates in Therapeutic [place] to Support Successful Transition  Outcome: Ongoing, Progressing  Goal: Rounds/Family Conference  Outcome: Ongoing, Progressing

## 2020-08-03 NOTE — ED TRIAGE NOTES
Pt reports to ED via EMS c/o intractable nausea and vomiting/ dry heaving. Pt reports black stool this morning. Pt has a hx of alcoholism, last drink was last night. Pt is visibly tremulous, sweaty, anxious. Pt reports an episode of auditory hallucinations this morning but denies auditory, tactile, and visual hallucinations at this time.

## 2020-08-03 NOTE — SUBJECTIVE & OBJECTIVE
"Past Medical History:   Diagnosis Date    Colon polyps     Gastric bypass status for obesity 2006    Hypertension     MDD (major depressive disorder)     Panic attack     Postmenopausal HRT (hormone replacement therapy)     Ulcer        Past Surgical History:   Procedure Laterality Date    ABDOMINAL SURGERY      BREAST BIOPSY  2005    right    CHOLECYSTECTOMY      ESOPHAGOGASTRODUODENOSCOPY N/A 4/18/2019    Procedure: EGD (ESOPHAGOGASTRODUODENOSCOPY);  Surgeon: Mony Plunkett MD;  Location: [facility];  Service: Endoscopy;  Laterality: N/A;    GASTRIC BYPASS      zackary en Y    HERNIA REPAIR  x2    LI hernia    HERNIA REPAIR      INFECTED SKIN DEBRIDEMENT  x3    KNEE ARTHROSCOPY W/ DEBRIDEMENT      right       Review of patient's allergies indicates:   Allergen Reactions    Aspirin      Due to gastric bypass    Ibuprofen      Other reaction(s): Due To Gastric Bypass  Due to gastric bypass    Inderal [propranolol]      "Felt bizarre"       No current facility-administered medications on file prior to encounter.      Current Outpatient Medications on File Prior to Encounter   Medication Sig    FLUoxetine 20 MG capsule Take 1 capsule (20 mg total) by mouth every evening. Also taking Fluoxetine 40 mg QAM    FLUoxetine 40 MG capsule Take 1 capsule (40 mg total) by mouth once daily. Also taking fluoxetine 20 mg QPM    hydrOXYzine pamoate (VISTARIL) 25 MG Cap     pantoprazole (PROTONIX) 40 MG tablet Take 1 tablet PO BID x 2 weeks, then 1 tablet PO QD    trazodone HCl (TRAZODONE ORAL)      Family History     Problem Relation (Age of Onset)    Alzheimer's disease Father (78)    Breast cancer Maternal Uncle    Cancer Mother, Paternal Grandfather    Dementia Father    Hyperlipidemia Father    Hypertension Father        Tobacco Use    Smoking status: Never Smoker    Smokeless tobacco: Never Used   Substance and Sexual Activity    Alcohol use: Yes     Alcohol/week: 0.0 standard drinks     Comment: 1 small " bottle wine daily (10oz) + 4 strong vodka cocktails daily    Drug use: No    Sexual activity: Yes     Partners: Male     Birth control/protection: None     Review of Systems   Constitutional: Positive for activity change. Negative for chills, diaphoresis, fatigue and fever.   HENT: Negative for congestion, drooling and hearing loss.    Eyes: Negative for discharge.   Respiratory: Positive for cough. Negative for apnea, chest tightness, shortness of breath and wheezing.    Cardiovascular: Negative for chest pain, palpitations and leg swelling.   Gastrointestinal: Positive for abdominal pain, blood in stool and nausea. Negative for abdominal distention, constipation, diarrhea, rectal pain and vomiting.   Endocrine: Negative for cold intolerance and heat intolerance.   Genitourinary: Negative for difficulty urinating and dysuria.   Musculoskeletal: Negative for arthralgias and gait problem.   Skin: Negative for rash.   Neurological: Positive for tremors, weakness and light-headedness. Negative for seizures and numbness.   Hematological: Negative for adenopathy.   Psychiatric/Behavioral: Negative for agitation and behavioral problems.     Objective:     Vital Signs (Most Recent):  Temp: 97.9 °F (36.6 °C) (08/03/20 1054)  Pulse: 101 (08/03/20 1302)  Resp: (!) 26 (08/03/20 1246)  BP: (!) 151/88 (08/03/20 1502)  SpO2: 98 % (08/03/20 1502) Vital Signs (24h Range):  Temp:  [97.9 °F (36.6 °C)] 97.9 °F (36.6 °C)  Pulse:  [] 101  Resp:  [16-26] 26  SpO2:  [95 %-100 %] 98 %  BP: (151-174)/(87-99) 151/88     Weight: 74.8 kg (165 lb)  Body mass index is 27.46 kg/m².    Physical Exam  Vitals signs reviewed.   Constitutional:       General: She is not in acute distress.     Appearance: She is well-developed. She is diaphoretic. She is not ill-appearing or toxic-appearing.   HENT:      Head: Normocephalic and atraumatic.      Mouth/Throat:      Mouth: Mucous membranes are dry.   Eyes:      Pupils: Pupils are equal, round, and  reactive to light.   Neck:      Musculoskeletal: Normal range of motion and neck supple.   Cardiovascular:      Rate and Rhythm: Regular rhythm. Tachycardia present.      Heart sounds: Murmur present.   Pulmonary:      Effort: Pulmonary effort is normal. No respiratory distress.      Breath sounds: Normal breath sounds.   Abdominal:      Comments: Soft, non-distended, mild ttp in mid lower abdomen, no rebound or guarding, strong bowel sounds.   Musculoskeletal: Normal range of motion.   Skin:     General: Skin is warm.      Coloration: Skin is not jaundiced.   Neurological:      Mental Status: She is alert and oriented to person, place, and time.      Cranial Nerves: No cranial nerve deficit.      Coordination: Coordination normal.      Comments: Alert and oriented x 3, normal strength and no focal weakness, mild fine tremors.   Psychiatric:         Behavior: Behavior normal.           CRANIAL NERVES     CN III, IV, VI   Pupils are equal, round, and reactive to light.       Significant Labs: All pertinent labs within the past 24 hours have been reviewed.    Significant Imaging: I have reviewed and interpreted all pertinent imaging results/findings within the past 24 hours.

## 2020-08-03 NOTE — ASSESSMENT & PLAN NOTE
-Not on treatment at home.  -Hypertensive in ER likely due to alcohol withdrawal.  -Will provide prn hydralazine.

## 2020-08-03 NOTE — ASSESSMENT & PLAN NOTE
-Mrs. Chu is admitted to inpatient status  -She has melanic stool for two days which is heme positive.  -Noted history of prior gastric bypass, peptic ulcer disease and GI bleeding.  Also is at risk for alcoholic gastritis or gastropathy.  -Not anemic on admit, but appears hemoconcentrated likely from dehydration.  Anticipate repeat labs will show anemia.  -Will monitor cbc q8h and transfuse for Hb > 7.  She was consented in the ER.  -Will treat with IV fluids and bid IV protonix.  -GI has been consulted and plan for EGD in AM.  -Will allow clear liquids now and NPO after midnight.

## 2020-08-03 NOTE — HPI
Mrs. Chu is a 59 year old woman with history of peptic ulcer disease, gastric bypass in 2006 and alcoholism who presented for evaluation of abdominal pain, nausea and black stool.  She notes that the black stool was first noticed two days ago.  Over the last 24 hours the stool has become thick and tarry.  She has had six black tarry stools in the last 24 hours.  She notes this is associated with abdominal pain that started 4 days ago.  The pain is burning in sensation and located in her mid upper and lower abdomen.  It is non-radiating.  She reports associated cough and nausea for the last two days as well.  She does not take NSAIDs but is an alcoholic.  After 10 months of sobriety she relapsed in April of this year.  Currently she is drinking one small bottle of wine (10oz) and four strong vodka cocktails daily.  Her last drink was last night at 9PM.  She states she has not gone this long with alcohol since April and feels very anxious and tremulous.  She has had alcohol withdrawal before, but she states it has never been this bad.  She denies fevers, chills, vomiting, constipation, diarrhea, chest pain, numbness, tingling or focal weakness.

## 2020-08-03 NOTE — CONSULTS
.[facility]  Gastroenterology  Consult Note    Patient Name: Alicia Chu  MRN: [id number]  Admission Date: 8/3/2020  Hospital Length of Stay: 0 days  Code Status: Prior   Primary Care Physician: Primary Doctor No  Principal Problem:Acute upper GI bleeding    Inpatient consult to Gastroenterology  Consult performed by: Nhi Oneal PA-C  Consult ordered by: Franco Valdez MD    Inpatient consult to Gastroenterology  Consult performed by: Nhi Oneal PA-C  Consult ordered by: Marco Michelle MD        Subjective:     Chief complaint: GI bleed     HPI: The patient is a 59 year old female with a history of HTN, depression and obesity s/p gastric bypass presenting with GI bleed.  She reports acute onset, persistent, progressive, black, tarry stools beginning three days ago.  Associated nausea, weakness and lightheadedness.  No significant abdominal pain.  She denies NSAID use but does drink alcohol heavily.  She takes over the counter medication for acid suppression at home.  She reports a history of similar symptoms in April of last year.  EGD at that time revealed large clean based ulceration at the gastrojejunal anastomosis with mild stenosis.      Past medical history:  History of hypertension.   Depression.    Past surgical history:  Gastric bypass, 2006.  Right knee arthroplasty.   Cholecystectomy.   Hernia repair.    Social history:  Tobacco use: denies.   Alcohol use: admits to heavy EtOH use.   Illicit drug use: denies.    Family history:  Her mother had colorectal cancer.    Medications on admission:  Prozac.     Allergies:  Propanolol (did not like the way it made her feel).     Review of systems:  CONSTITUTIONAL: Positive for weakness. Negative for fever, chills, weight loss, weight gain.  HEENT: Negative for blurred vision, hearing loss, nasal congestion, dry mouth, sore throat.  CARDIOVASCULAR: Negative for chest pain or palpitations.  RESPIRATORY: Negative for  SOB or cough.  GASTROINTESTINAL: See HPI  GENITOURINARY: Negative for dysuria or hematuria.  MUSCULOSKELETAL: Negative for osteoarthritis or muscle pain.  SKIN: Negative for rashes/lesions.  NEUROLOGIC: Positive for lightheadedness. Negative for headaches, numbness/tingling.  ENDOCRINE: Negative for diabetes or thyroid abnormalities.  HEMATOLOGIC: Negative for anemia or blood dyscrasias.    Objective:     Vital Signs (Most Recent):  Temp: 97.9 °F (36.6 °C) (08/03/20 1054)  Pulse: 101 (08/03/20 1302)  Resp: (!) 26 (08/03/20 1246)  BP: (!) 161/97 (08/03/20 1432)  SpO2: 98 % (08/03/20 1432) Vital Signs (24h Range):  Temp:  [97.9 °F (36.6 °C)] 97.9 °F (36.6 °C)  Pulse:  [] 101  Resp:  [16-26] 26  SpO2:  [95 %-100 %] 98 %  BP: (151-174)/(87-99) 161/97     Physical examination:  General: Well developed female in no apparent distress  HENT: NCAT, atraumatic, hearing grossly intact, no visible or palpable thyroid mass  Eyes: PERRL, EOMI, anicteric sclera  Cardiovascular: Regular rate and rhythm. No peripheral edema.   Lungs: Non-labored respirations. Breath sounds equal.   Abdomen: soft, NTND, normoactive BS  Extremities: No C/C, 2+ dorsalis pedis pulses bilaterally  Neuro: AA&O x 3, no asterixes or tremors  Psych: Appropriate mood and affect. No SI.  Skin: No jaundice, rashes or lesions  Musculoskeletal: 5/5 strength bilaterally    Recent Labs   Lab 08/03/20  1145   WBC 4.54   HGB 11.8*   HCT 36.9*        Recent Labs   Lab 08/03/20  1145   *   CALCIUM 9.0   ALBUMIN 3.7   PROT 6.9      K 4.3   CO2 20*   CL 94*   BUN 12   CREATININE 0.9   ALKPHOS 287*   ALT 96*   *   BILITOT 2.7*     Recent Labs   Lab 08/03/20  1145   INR 1.0       Imaging:  None.       Assessment:   59 year old female with a history of HTN and gastric bypass presenting with melena.  BUN nml.  H&H stable.  VSS.      Plan:   1.  EGD tomorrow.   2.  Anesthesia consultation.  3.  IV PPI BID.  4.  Serial monitoring of CBC and  transfuse pRBCs prn.  5.  OK for clear liquid diet today, NPO after MN.      Thank you for your consult.     Nhi Oneal PA-C  Gastroenterology  [facility]

## 2020-08-03 NOTE — ED PROVIDER NOTES
"Encounter Date: 8/3/2020    SCRIBE #1 NOTE: I, Terri Cohen, am scribing for, and in the presence of,  Marco Michelle MD. I have scribed the following portions of the note - Other sections scribed: HPI, ROS, PE, MDM.       History     Chief Complaint   Patient presents with    Nausea     nausea w/ blood in stool     This is a 59 year old female with a PMHx of peptic ulcer who presents to the ED complaining of nausea, rectal bleeding, and mid abdominal pain that started last night. She reports that she feels like she is going through alcohol withdrawal and her last drink was last night at 2100. She states that she normally drinks 1 bottle of wine per day and an unknown amount of vodka. She notes that she has gone 14 hours without drinking before and this has never happened before. She states that she currently feels diaphoretic, shaky, and nauseated. She reports that her abdomen is "throbbing" and she's had a cough all morning. She notes that she's had 5 episodes of melena since yesterday with diarrhea. She states that she's also had a craving for ice for the past 5 days. Denies vomiting or any other associated symptoms.    The history is provided by the patient. No  was used.     Review of patient's allergies indicates:   Allergen Reactions    Aspirin      Due to gastric bypass    Ibuprofen      Other reaction(s): Due To Gastric Bypass  Due to gastric bypass    Inderal [propranolol]      "Felt bizarre"     Past Medical History:   Diagnosis Date    Colon polyps     Gastric bypass status for obesity 2006    Hypertension     MDD (major depressive disorder)     Panic attack     Postmenopausal HRT (hormone replacement therapy)     Ulcer      Past Surgical History:   Procedure Laterality Date    ABDOMINAL SURGERY      BREAST BIOPSY  2005    right    CHOLECYSTECTOMY      ESOPHAGOGASTRODUODENOSCOPY N/A 4/18/2019    Procedure: EGD (ESOPHAGOGASTRODUODENOSCOPY);  Surgeon: Mony Plunkett, " MD;  Location: [facility];  Service: Endoscopy;  Laterality: N/A;    GASTRIC BYPASS      zackary en Y    HERNIA REPAIR  x2    LI hernia    HERNIA REPAIR      INFECTED SKIN DEBRIDEMENT  x3    KNEE ARTHROSCOPY W/ DEBRIDEMENT      right     Family History   Problem Relation Age of Onset    Cancer Mother         colon    Dementia Father     Hypertension Father     Hyperlipidemia Father     Alzheimer's disease Father 78    Cancer Paternal Grandfather         colon cancer    Breast cancer Maternal Uncle      Social History     Tobacco Use    Smoking status: Never Smoker    Smokeless tobacco: Never Used   Substance Use Topics    Alcohol use: Yes     Alcohol/week: 0.0 standard drinks     Comment: 1 small bottle wine daily (10oz) + 4 strong vodka cocktails daily    Drug use: No     Review of Systems   Constitutional: Positive for diaphoresis. Negative for fever.   HENT: Negative for sore throat.    Eyes: Negative for visual disturbance.   Respiratory: Positive for cough. Negative for shortness of breath.    Cardiovascular: Negative for chest pain.   Gastrointestinal: Positive for abdominal pain, anal bleeding, blood in stool, diarrhea and nausea. Negative for vomiting.   Genitourinary: Negative for dysuria.   Musculoskeletal: Negative for back pain.   Skin: Negative for rash.   Neurological: Positive for tremors. Negative for headaches.       Physical Exam     Initial Vitals [08/03/20 1054]   BP Pulse Resp Temp SpO2   (!) 174/90 93 16 97.9 °F (36.6 °C) 100 %      MAP       --         Physical Exam    Nursing note and vitals reviewed.  Constitutional: She appears well-developed and well-nourished.   HENT:   Head: Normocephalic and atraumatic.   Eyes: EOM are normal. Pupils are equal, round, and reactive to light.   Neck: Normal range of motion. Neck supple. No thyromegaly present. No JVD present.   Cardiovascular: Exam reveals no gallop and no friction rub.    No murmur heard.  Tachycardia heart rate of 108 during  my examination   Pulmonary/Chest: Breath sounds normal. No respiratory distress.   Abdominal: Soft. Bowel sounds are normal. There is no abdominal tenderness.   Genitourinary: Rectum:      Guaiac result positive.   Guaiac positive stool. : Acceptable.  Musculoskeletal: Normal range of motion. No tenderness or edema.   Neurological: She is alert and oriented to person, place, and time. She has normal strength. GCS score is 15. GCS eye subscore is 4. GCS verbal subscore is 5. GCS motor subscore is 6.   Patient is significantly tremulous especially with intention   Skin: Skin is warm and dry. Capillary refill takes less than 2 seconds.         ED Course   Critical Care    Date/Time: 8/3/2020 2:00 PM  Performed by: Marco Michelle MD  Authorized by: Marco Michelle MD   Direct patient critical care time: 15 minutes  Additional history critical care time: 8 minutes  Ordering / reviewing critical care time: 10 minutes  Documentation critical care time: 8 minutes  Consulting other physicians critical care time: 5 minutes  Total critical care time (exclusive of procedural time) : 46 minutes  Critical care time was exclusive of separately billable procedures and treating other patients and teaching time.  Critical care was necessary to treat or prevent imminent or life-threatening deterioration of the following conditions: circulatory failure, shock and toxidrome.  Critical care was time spent personally by me on the following activities: development of treatment plan with patient or surrogate, discussions with consultants, interpretation of cardiac output measurements, evaluation of patient's response to treatment, examination of patient, obtaining history from patient or surrogate, ordering and performing treatments and interventions, ordering and review of laboratory studies, ordering and review of radiographic studies, pulse oximetry, re-evaluation of patient's condition and review of old  charts.        Labs Reviewed   CBC W/ AUTO DIFFERENTIAL - Abnormal; Notable for the following components:       Result Value    Hemoglobin 11.8 (*)     Hematocrit 36.9 (*)     RDW 19.9 (*)     Immature Granulocytes 0.9 (*)     All other components within normal limits   COMPREHENSIVE METABOLIC PANEL - Abnormal; Notable for the following components:    Chloride 94 (*)     CO2 20 (*)     Glucose 173 (*)     Total Bilirubin 2.7 (*)     Alkaline Phosphatase 287 (*)      (*)     ALT 96 (*)     Anion Gap 25 (*)     All other components within normal limits   URINALYSIS, REFLEX TO URINE CULTURE - Abnormal; Notable for the following components:    Protein, UA 1+ (*)     Ketones, UA 2+ (*)     Occult Blood UA 2+ (*)     Urobilinogen, UA 2.0-3.0 (*)     Leukocytes, UA 2+ (*)     All other components within normal limits    Narrative:     Specimen Source->Urine   URINALYSIS MICROSCOPIC - Abnormal; Notable for the following components:    RBC, UA 20 (*)     WBC, UA 18 (*)     WBC Clumps, UA Moderate (*)     Bacteria Moderate (*)     All other components within normal limits    Narrative:     Specimen Source->Urine   CULTURE, URINE   PROTIME-INR   LIPASE   DRUG SCREEN PANEL, URINE EMERGENCY    Narrative:     Specimen Source->Urine   SARS-COV-2 RNA AMPLIFICATION, QUAL   ALCOHOL,MEDICAL (ETHANOL)   TYPE & SCREEN        ECG Results          EKG 12-lead (In process)  Result time 08/03/20 12:26:56    In process by Interface, Lab In [facility] (08/03/20 12:26:56)                 Narrative:    Test Reason : K92.2,    Vent. Rate : 099 BPM     Atrial Rate : 097 BPM     P-R Int : 146 ms          QRS Dur : 080 ms      QT Int : 500 ms       P-R-T Axes : 047 043 050 degrees     QTc Int : 641 ms    Undetermined rhythm  Nonspecific ST abnormality  Prolonged QT  Abnormal ECG  When compared with ECG of 13-JUL-2020 20:41,  Significant changes have occurred    Referred By: [email or web address]   SELF           Confirmed By:                   In process  by Interface, Lab In [facility] (08/03/20 12:26:03)                 Narrative:    Test Reason : K92.2,    Vent. Rate : 099 BPM     Atrial Rate : 097 BPM     P-R Int : 146 ms          QRS Dur : 080 ms      QT Int : 500 ms       P-R-T Axes : 047 043 050 degrees     QTc Int : 641 ms    Undetermined rhythm  Nonspecific ST abnormality  Prolonged QT  Abnormal ECG  When compared with ECG of 13-JUL-2020 20:41,  Significant changes have occurred    Referred By: [email or web address]   SELF           Confirmed By:                             Imaging Results    None          Medical Decision Making:   Initial Assessment:   59 year old female presents to the ED complaining of nausea, rectal bleeding, diarrhea, mid abdominal pain, cough, tremors, and diaphoresis. The nursing note and vital signs were reviewed. I examined the patient. Secondary to the patient's symptoms, I ordered a drug screen panel, urinalysis, ethanol, lipase, CBC, CMP, protime-INR, and COVID-19 rapid screening. An EKG was also ordered.      Patient has evidence of alcohol withdrawal.  Treated with benzodiazepines IV Ativan 2 mg with significant improvement.  Patient has had 6 melanotic bowel movement since yesterday.  Patient drinks alcohol daily.  States she has had some upper abdominal pain and nausea but no hematemesis.  Concern for upper GI bleeding.  BUN and creatinine are normal.  H and H is stable at her baseline at this time but it does have mild anemia.  Will admit to the hospital for serial H&H is in GI consultation.  I spoke with Dr. Valdez who accepted the patient.  I also spoke with the gastroenterologist who plans do an EGD tomorrow.  Patient given 80 mg of IV Protonix on arrival and is on a Protonix drip of 8 mg an hour.  Patient was given Rocephin IV 1 g given concern for potential liver dysfunction.  Patient also found to have urinary tract infection.  Patient had 1 melanotic bowel movement while here in the emergency department.  Denies taking Pepto-Bismol  or iron supplementation.          Scribe Attestation:   Scribe #1: I performed the above scribed service and the documentation accurately describes the services I performed. I attest to the accuracy of the note.                          Clinical Impression:       ICD-10-CM ICD-9-CM   1. Urinary tract infection without hematuria, site unspecified  N39.0 599.0   2. GI bleed  K92.2 578.9   3. Chest pain  R07.9 786.50   4. Alcohol withdrawal syndrome with complication  F10.239 291.81             ED Disposition Condition    Admit            Scribe Attestation: I, Marco Michelle, personally performed the services described in this documentation. All medical record entries made by the scribe were at my direction and in my presence. I have reviewed the chart and agree that the record reflects my personal performance and is accurate and complete.               Marco Michelle MD  08/03/20 [id number]

## 2020-08-04 ENCOUNTER — ANESTHESIA (OUTPATIENT)
Dept: ENDOSCOPY | Facility: HOSPITAL | Age: 60
DRG: 378 | End: 2020-08-04
Payer: COMMERCIAL

## 2020-08-04 ENCOUNTER — ANESTHESIA EVENT (OUTPATIENT)
Dept: ENDOSCOPY | Facility: HOSPITAL | Age: 60
DRG: 378 | End: 2020-08-04
Payer: MEDICAID

## 2020-08-04 PROBLEM — K70.10 ACUTE ALCOHOLIC HEPATITIS: Status: ACTIVE | Noted: 2020-08-03

## 2020-08-04 PROBLEM — F10.930 ALCOHOL WITHDRAWAL SYNDROME WITHOUT COMPLICATION: Status: ACTIVE | Noted: 2020-08-04

## 2020-08-04 PROBLEM — F10.20 ALCOHOL USE DISORDER, SEVERE, DEPENDENCE: Status: ACTIVE | Noted: 2020-08-03

## 2020-08-04 LAB
ALBUMIN SERPL BCP-MCNC: 2.7 G/DL (ref 3.5–5.2)
ALP SERPL-CCNC: 226 U/L (ref 55–135)
ALT SERPL W/O P-5'-P-CCNC: 51 U/L (ref 10–44)
ANION GAP SERPL CALC-SCNC: 8 MMOL/L (ref 8–16)
AST SERPL-CCNC: 77 U/L (ref 10–40)
BASOPHILS # BLD AUTO: 0.02 K/UL (ref 0–0.2)
BASOPHILS NFR BLD: 0.7 % (ref 0–1.9)
BILIRUB SERPL-MCNC: 1.2 MG/DL (ref 0.1–1)
BUN SERPL-MCNC: 9 MG/DL (ref 6–20)
CALCIUM SERPL-MCNC: 8.1 MG/DL (ref 8.7–10.5)
CHLORIDE SERPL-SCNC: 100 MMOL/L (ref 95–110)
CO2 SERPL-SCNC: 28 MMOL/L (ref 23–29)
CREAT SERPL-MCNC: 0.8 MG/DL (ref 0.5–1.4)
DIFFERENTIAL METHOD: ABNORMAL
EOSINOPHIL # BLD AUTO: 0 K/UL (ref 0–0.5)
EOSINOPHIL NFR BLD: 0.7 % (ref 0–8)
ERYTHROCYTE [DISTWIDTH] IN BLOOD BY AUTOMATED COUNT: 20.2 % (ref 11.5–14.5)
EST. GFR  (AFRICAN AMERICAN): >60 ML/MIN/1.73 M^2
EST. GFR  (NON AFRICAN AMERICAN): >60 ML/MIN/1.73 M^2
GLUCOSE SERPL-MCNC: 107 MG/DL (ref 70–110)
HCT VFR BLD AUTO: 32.5 % (ref 37–48.5)
HGB BLD-MCNC: 10 G/DL (ref 12–16)
IMM GRANULOCYTES # BLD AUTO: 0.01 K/UL (ref 0–0.04)
IMM GRANULOCYTES NFR BLD AUTO: 0.3 % (ref 0–0.5)
LYMPHOCYTES # BLD AUTO: 1.3 K/UL (ref 1–4.8)
LYMPHOCYTES NFR BLD: 44 % (ref 18–48)
MAGNESIUM SERPL-MCNC: 1.2 MG/DL (ref 1.6–2.6)
MCH RBC QN AUTO: 28.1 PG (ref 27–31)
MCHC RBC AUTO-ENTMCNC: 30.8 G/DL (ref 32–36)
MCV RBC AUTO: 91 FL (ref 82–98)
MONOCYTES # BLD AUTO: 0.4 K/UL (ref 0.3–1)
MONOCYTES NFR BLD: 13.2 % (ref 4–15)
NEUTROPHILS # BLD AUTO: 1.2 K/UL (ref 1.8–7.7)
NEUTROPHILS NFR BLD: 41.1 % (ref 38–73)
NRBC BLD-RTO: 0 /100 WBC
PLATELET # BLD AUTO: 124 K/UL (ref 150–350)
PMV BLD AUTO: 11.3 FL (ref 9.2–12.9)
POTASSIUM SERPL-SCNC: 3.1 MMOL/L (ref 3.5–5.1)
POTASSIUM SERPL-SCNC: 3.6 MMOL/L (ref 3.5–5.1)
PROT SERPL-MCNC: 5.4 G/DL (ref 6–8.4)
RBC # BLD AUTO: 3.56 M/UL (ref 4–5.4)
SODIUM SERPL-SCNC: 136 MMOL/L (ref 136–145)
WBC # BLD AUTO: 3.02 K/UL (ref 3.9–12.7)

## 2020-08-04 PROCEDURE — 43239 EGD BIOPSY SINGLE/MULTIPLE: CPT | Performed by: INTERNAL MEDICINE

## 2020-08-04 PROCEDURE — 21400001 HC TELEMETRY ROOM

## 2020-08-04 PROCEDURE — 25000003 PHARM REV CODE 250: Performed by: HOSPITALIST

## 2020-08-04 PROCEDURE — 25000003 PHARM REV CODE 250

## 2020-08-04 PROCEDURE — 25000003 PHARM REV CODE 250: Performed by: INTERNAL MEDICINE

## 2020-08-04 PROCEDURE — D9220A PRA ANESTHESIA: ICD-10-PCS | Mod: ANES,,, | Performed by: ANESTHESIOLOGY

## 2020-08-04 PROCEDURE — 27201012 HC FORCEPS, HOT/COLD, DISP: Performed by: INTERNAL MEDICINE

## 2020-08-04 PROCEDURE — 80053 COMPREHEN METABOLIC PANEL: CPT

## 2020-08-04 PROCEDURE — 88305 TISSUE EXAM BY PATHOLOGIST: ICD-10-PCS | Mod: 26,,, | Performed by: PATHOLOGY

## 2020-08-04 PROCEDURE — C9113 INJ PANTOPRAZOLE SODIUM, VIA: HCPCS | Performed by: HOSPITALIST

## 2020-08-04 PROCEDURE — 84132 ASSAY OF SERUM POTASSIUM: CPT

## 2020-08-04 PROCEDURE — 37000008 HC ANESTHESIA 1ST 15 MINUTES: Performed by: INTERNAL MEDICINE

## 2020-08-04 PROCEDURE — 63600175 PHARM REV CODE 636 W HCPCS: Performed by: INTERNAL MEDICINE

## 2020-08-04 PROCEDURE — 85025 COMPLETE CBC W/AUTO DIFF WBC: CPT

## 2020-08-04 PROCEDURE — 63600175 PHARM REV CODE 636 W HCPCS: Performed by: NURSE ANESTHETIST, CERTIFIED REGISTERED

## 2020-08-04 PROCEDURE — D9220A PRA ANESTHESIA: Mod: ANES,,, | Performed by: ANESTHESIOLOGY

## 2020-08-04 PROCEDURE — 88305 TISSUE EXAM BY PATHOLOGIST: CPT | Performed by: PATHOLOGY

## 2020-08-04 PROCEDURE — 63600175 PHARM REV CODE 636 W HCPCS: Performed by: HOSPITALIST

## 2020-08-04 PROCEDURE — 88305 TISSUE EXAM BY PATHOLOGIST: CPT | Mod: 26,,, | Performed by: PATHOLOGY

## 2020-08-04 PROCEDURE — 83735 ASSAY OF MAGNESIUM: CPT

## 2020-08-04 PROCEDURE — D9220A PRA ANESTHESIA: ICD-10-PCS | Mod: CRNA,,, | Performed by: NURSE ANESTHETIST, CERTIFIED REGISTERED

## 2020-08-04 PROCEDURE — 36415 COLL VENOUS BLD VENIPUNCTURE: CPT

## 2020-08-04 PROCEDURE — 63600175 PHARM REV CODE 636 W HCPCS

## 2020-08-04 PROCEDURE — D9220A PRA ANESTHESIA: Mod: CRNA,,, | Performed by: NURSE ANESTHETIST, CERTIFIED REGISTERED

## 2020-08-04 PROCEDURE — 25000003 PHARM REV CODE 250: Performed by: NURSE ANESTHETIST, CERTIFIED REGISTERED

## 2020-08-04 RX ORDER — DIAZEPAM 5 MG/1
5 TABLET ORAL EVERY 12 HOURS
Status: COMPLETED | OUTPATIENT
Start: 2020-08-04 | End: 2020-08-05

## 2020-08-04 RX ORDER — POTASSIUM CHLORIDE 7.45 MG/ML
10 INJECTION INTRAVENOUS
Status: COMPLETED | OUTPATIENT
Start: 2020-08-04 | End: 2020-08-04

## 2020-08-04 RX ORDER — PROPOFOL 10 MG/ML
VIAL (ML) INTRAVENOUS
Status: DISCONTINUED | OUTPATIENT
Start: 2020-08-04 | End: 2020-08-04

## 2020-08-04 RX ORDER — DIAZEPAM 5 MG/1
5 TABLET ORAL EVERY 12 HOURS
Status: DISCONTINUED | OUTPATIENT
Start: 2020-08-04 | End: 2020-08-04

## 2020-08-04 RX ORDER — LIDOCAINE HYDROCHLORIDE 20 MG/ML
INJECTION INTRAVENOUS
Status: DISCONTINUED | OUTPATIENT
Start: 2020-08-04 | End: 2020-08-04

## 2020-08-04 RX ORDER — FOLIC ACID 1 MG/1
1 TABLET ORAL DAILY
Status: DISCONTINUED | OUTPATIENT
Start: 2020-08-05 | End: 2020-08-06 | Stop reason: HOSPADM

## 2020-08-04 RX ORDER — LANOLIN ALCOHOL/MO/W.PET/CERES
100 CREAM (GRAM) TOPICAL DAILY
Status: DISCONTINUED | OUTPATIENT
Start: 2020-08-05 | End: 2020-08-06 | Stop reason: HOSPADM

## 2020-08-04 RX ORDER — SUCRALFATE 1 G/10ML
1 SUSPENSION ORAL
Status: DISCONTINUED | OUTPATIENT
Start: 2020-08-04 | End: 2020-08-06 | Stop reason: HOSPADM

## 2020-08-04 RX ORDER — PROPOFOL 10 MG/ML
INJECTION, EMULSION INTRAVENOUS
Status: COMPLETED
Start: 2020-08-04 | End: 2020-08-04

## 2020-08-04 RX ORDER — SODIUM CHLORIDE 9 MG/ML
INJECTION, SOLUTION INTRAVENOUS CONTINUOUS PRN
Status: DISCONTINUED | OUTPATIENT
Start: 2020-08-04 | End: 2020-08-04

## 2020-08-04 RX ORDER — LIDOCAINE HYDROCHLORIDE 20 MG/ML
INJECTION, SOLUTION EPIDURAL; INFILTRATION; INTRACAUDAL; PERINEURAL
Status: COMPLETED
Start: 2020-08-04 | End: 2020-08-04

## 2020-08-04 RX ADMIN — DIAZEPAM 5 MG: 5 TABLET ORAL at 09:08

## 2020-08-04 RX ADMIN — PANTOPRAZOLE SODIUM 40 MG: 40 INJECTION, POWDER, FOR SOLUTION INTRAVENOUS at 08:08

## 2020-08-04 RX ADMIN — POTASSIUM CHLORIDE 10 MEQ: 7.46 INJECTION, SOLUTION INTRAVENOUS at 12:08

## 2020-08-04 RX ADMIN — LIDOCAINE HYDROCHLORIDE: 20 INJECTION, SOLUTION EPIDURAL; INFILTRATION; INTRACAUDAL; PERINEURAL at 12:08

## 2020-08-04 RX ADMIN — PROPOFOL: 10 INJECTION, EMULSION INTRAVENOUS at 12:08

## 2020-08-04 RX ADMIN — SODIUM CHLORIDE: 0.9 INJECTION, SOLUTION INTRAVENOUS at 11:08

## 2020-08-04 RX ADMIN — ONDANSETRON 8 MG: 8 TABLET, ORALLY DISINTEGRATING ORAL at 09:08

## 2020-08-04 RX ADMIN — POTASSIUM CHLORIDE 10 MEQ: 7.46 INJECTION, SOLUTION INTRAVENOUS at 06:08

## 2020-08-04 RX ADMIN — FLUOXETINE 40 MG: 20 CAPSULE ORAL at 02:08

## 2020-08-04 RX ADMIN — Medication 6 MG: at 11:08

## 2020-08-04 RX ADMIN — DIAZEPAM 5 MG: 5 TABLET ORAL at 06:08

## 2020-08-04 RX ADMIN — PROPOFOL 100 MG: 10 INJECTION, EMULSION INTRAVENOUS at 12:08

## 2020-08-04 RX ADMIN — SUCRALFATE 1 G: 1 SUSPENSION ORAL at 05:08

## 2020-08-04 RX ADMIN — Medication 120 MG: at 12:08

## 2020-08-04 RX ADMIN — ACETAMINOPHEN 650 MG: 325 TABLET ORAL at 05:08

## 2020-08-04 RX ADMIN — Medication 6 MG: at 12:08

## 2020-08-04 RX ADMIN — SODIUM CHLORIDE: 0.9 INJECTION, SOLUTION INTRAVENOUS at 12:08

## 2020-08-04 RX ADMIN — PROPOFOL 50 MG: 10 INJECTION, EMULSION INTRAVENOUS at 12:08

## 2020-08-04 RX ADMIN — SUCRALFATE 1 G: 1 SUSPENSION ORAL at 09:08

## 2020-08-04 RX ADMIN — PROPOFOL 20 MG: 10 INJECTION, EMULSION INTRAVENOUS at 12:08

## 2020-08-04 RX ADMIN — ONDANSETRON 8 MG: 8 TABLET, ORALLY DISINTEGRATING ORAL at 02:08

## 2020-08-04 RX ADMIN — PANTOPRAZOLE SODIUM 40 MG: 40 INJECTION, POWDER, FOR SOLUTION INTRAVENOUS at 09:08

## 2020-08-04 RX ADMIN — POTASSIUM CHLORIDE 10 MEQ: 7.46 INJECTION, SOLUTION INTRAVENOUS at 02:08

## 2020-08-04 RX ADMIN — POTASSIUM CHLORIDE 10 MEQ: 7.46 INJECTION, SOLUTION INTRAVENOUS at 09:08

## 2020-08-04 NOTE — PROGRESS NOTES
[facility] Hospital Medicine  Progress Note    Patient Name: Alicia Chu  MRN: [id number]  Patient Class: IP- Inpatient   Admission Date: 8/3/2020  Length of Stay: 1 days  Attending Physician: Leonila Alonzo MD  Primary Care Provider: Primary Doctor No        Subjective:     Principal Problem:Acute upper GI bleeding        HPI:  Mrs. Chu is a 59 year old woman with history of peptic ulcer disease, gastric bypass in 2006 and alcoholism who presented for evaluation of abdominal pain, nausea and black stool.  She notes that the black stool was first noticed two days ago.  Over the last 24 hours the stool has become thick and tarry.  She has had six black tarry stools in the last 24 hours.  She notes this is associated with abdominal pain that started 4 days ago.  The pain is burning in sensation and located in her mid upper and lower abdomen.  It is non-radiating.  She reports associated cough and nausea for the last two days as well.  She does not take NSAIDs but is an alcoholic.  After 10 months of sobriety she relapsed in April of this year.  Currently she is drinking one small bottle of wine (10oz) and four strong vodka cocktails daily.  Her last drink was last night at 9PM.  She states she has not gone this long with alcohol since April and feels very anxious and tremulous.  She has had alcohol withdrawal before, but she states it has never been this bad.  She denies fevers, chills, vomiting, constipation, diarrhea, chest pain, numbness, tingling or focal weakness.    Overview/Hospital Course:  Ms Leary presented with melena in setting of history of Keyshawn-en-Y gastric bypass and alcohol consumption. Patient also presented with acute alcoholic hepatitis and mild alcohol withdrawals. Placed on pantoprazole 40 mg IV BID, low dose diazepam, changed to NPO status and gastroenterology consulted. Also placed on intravenous fluid. Liver enzymes/TBil improved on a daily basis just with  supportive treatment.     Interval History: no tremors today. Feels well. Potassium 3.1. Mag level pending. Hgb 10 from 11 yesterday. BP stable.     Review of Systems   Respiratory: Negative.    Cardiovascular: Negative.    Gastrointestinal: Negative.      Objective:     Vital Signs (Most Recent):  Temp: 97.7 °F (36.5 °C) (08/04/20 0740)  Pulse: 90 (08/04/20 0740)  Resp: 16 (08/04/20 0740)  BP: 133/86 (08/04/20 0740)  SpO2: 98 % (08/04/20 0740) Vital Signs (24h Range):  Temp:  [97.7 °F (36.5 °C)-99 °F (37.2 °C)] 97.7 °F (36.5 °C)  Pulse:  [] 90  Resp:  [16-26] 16  SpO2:  [95 %-100 %] 98 %  BP: (133-174)/(77-99) 133/86     Weight: 73.4 kg (161 lb 13.1 oz)  Body mass index is 26.93 kg/m².    Intake/Output Summary (Last 24 hours) at 8/4/2020 0922  Last data filed at 8/4/2020 0600  Gross per 24 hour   Intake 1350 ml   Output --   Net 1350 ml      Physical Exam  Vitals signs and nursing note reviewed.   Constitutional:       General: She is not in acute distress.     Appearance: She is not toxic-appearing.   Cardiovascular:      Rate and Rhythm: Normal rate.   Pulmonary:      Effort: Pulmonary effort is normal.      Breath sounds: No wheezing or rales.   Abdominal:      General: Abdomen is flat. Bowel sounds are normal.      Palpations: Abdomen is soft.   Skin:     General: Skin is warm and dry.      Capillary Refill: Capillary refill takes less than 2 seconds.   Neurological:      Mental Status: She is alert and oriented to person, place, and time.      Comments: No tremors   Psychiatric:         Mood and Affect: Mood normal.         Thought Content: Thought content normal.         Judgment: Judgment normal.         Significant Labs: All pertinent labs within the past 24 hours have been reviewed.    Significant Imaging: I have reviewed all pertinent imaging results/findings within the past 24 hours.  I have reviewed and interpreted all pertinent imaging results/findings within the past 24  hours.      Assessment/Plan:      * Acute upper GI bleeding  Concern for ulceration to anastomotic site vs gastric ulcer vs other potential pathology   Plan for EGD today. Continue pantoprazole 40 mg IV BID  Is NPO. Will replace potassium. Mag level pending  Continue intravenous fluids  Appreciate further gastroenterology recs      Alcohol withdrawal syndrome without complication  Mild withdrawals  No tremors today  Will consider tapering dose today  Change banana bag to oral multivitamins/folic acid/thiamine to start after EGD or in AM      Acute alcoholic hepatitis  Likely related to alcohol consumption based on ratio of ast/alt  Labwork improving with supportive treatment and abstinence from alcohol  Consider imaging if any acute change       PUD (peptic ulcer disease)  Treatment as above.      Hyperbilirubinemia  Likely due to acute alcohol hepatitis  Improving with supportive treatment and abdominal exam benign  Consider imaging if unchanged or worsens      Alcohol use disorder, severe, dependence  Was sober after rehab for 10 months but relapsed in April  Has been drinking a 10oz bottle of wine and 4 strong vodka cocktails daily, per patient  Spent > 5 minutes discussing alcohol cessation      History of Keyshawn-en-Y gastric bypass  History noted  Concern for ulceration of anastomosis vs other etiology for bleeding. EGD planned for today      Essential hypertension  Not on treatment at home.  Currently stable without antihypertensive therapy  On treatment for alcohol withdrawals        VTE Risk Mitigation (From admission, onward)         Ordered     Place sequential compression device  Until discontinued      08/03/20 1607     IP VTE LOW RISK PATIENT  Once      08/03/20 1607     Place sequential compression device  Until discontinued      08/03/20 1607                Assessment and plan discussed with patient who currently has capacity to make own medical decisions. All questions answered to satisfaction.        Leonila Mary MD  Department of Hospital Medicine   [facility]

## 2020-08-04 NOTE — ASSESSMENT & PLAN NOTE
Not on treatment at home.  Currently stable without antihypertensive therapy  On treatment for alcohol withdrawals

## 2020-08-04 NOTE — ASSESSMENT & PLAN NOTE
Was sober after rehab for 10 months but relapsed in April  Has been drinking a 10oz bottle of wine and 4 strong vodka cocktails daily, per patient  Spent > 5 minutes discussing alcohol cessation

## 2020-08-04 NOTE — ASSESSMENT & PLAN NOTE
Likely related to alcohol consumption based on ratio of ast/alt  Labwork improving with supportive treatment and abstinence from alcohol  Consider imaging if any acute change

## 2020-08-04 NOTE — PLAN OF CARE
Patient is off the unit for a procedure. TN called spouse, no answer x 2.     08/04/20 1240   Discharge Assessment   Assessment Type Discharge Planning Assessment   Confirmed/corrected address and phone number on facesheet? No   Assessment information obtained from? Medical Record   Expected Length of Stay (days) 1   Communicated expected length of stay with patient/caregiver no   Prior to hospitilization cognitive status: Alert/Oriented   Prior to hospitalization functional status: Independent   Current cognitive status: Alert/Oriented   Current Functional Status: Independent   Lives With spouse   Able to Return to Prior Arrangements yes   Is patient able to care for self after discharge? Yes   Who are your caregiver(s) and their phone number(s)? spouse, Sukhdeep Guajardo [phone]   Readmission Within the Last 30 Days no previous admission in last 30 days   Patient currently being followed by outpatient case management? No   Patient currently receives any other outside agency services? No   Equipment Currently Used at Home none   Do you have any problems affording any of your prescribed medications? TBD   Is the patient taking medications as prescribed? yes   Does the patient have transportation home? Yes   Transportation Anticipated family or friend will provide;health plan transportation   Does the patient receive services at the [facility]? No   Discharge Plan A Home with family   Discharge Plan B Home with family   DME Needed Upon Discharge  none   Patient/Family in Agreement with Plan unable to assess     [id number] [address] [facility]  [address] [place] [address]  Phone: [phone] Fax: [phone]

## 2020-08-04 NOTE — ANESTHESIA POSTPROCEDURE EVALUATION
Anesthesia Post Evaluation    Patient: Alicia Chu    Procedure(s) Performed: Procedure(s) (LRB):  EGD (ESOPHAGOGASTRODUODENOSCOPY) (N/A)    Final Anesthesia Type: general    Patient location during evaluation: GI PACU  Patient participation: Yes- Able to Participate  Level of consciousness: awake and alert and oriented  Post-procedure vital signs: reviewed and stable  Pain management: adequate  Airway patency: patent    PONV status at discharge: No PONV  Anesthetic complications: no      Cardiovascular status: blood pressure returned to baseline and hemodynamically stable  Respiratory status: unassisted, spontaneous ventilation and room air  Hydration status: euvolemic  Follow-up not needed.          Vitals Value Taken Time   /79 08/04/20 1221   Temp 36.7 °C (98.1 °F) 08/04/20 1221   Pulse 100 08/04/20 1221   Resp 17 08/04/20 1221   SpO2 96 % 08/04/20 1221         No case tracking events are documented in the log.      Pain/Jasmin Score: Pain Rating Prior to Med Admin: 0 (8/4/2020  6:35 AM)  Pain Rating Post Med Admin: 0 (8/4/2020  6:35 AM)

## 2020-08-04 NOTE — TRANSFER OF CARE
"Anesthesia Transfer of Care Note    Patient: Alicia Chu    Procedure(s) Performed: Procedure(s) (LRB):  EGD (ESOPHAGOGASTRODUODENOSCOPY) (N/A)    Patient location: PACU    Anesthesia Type: general    Transport from OR: Transported from OR on room air with adequate spontaneous ventilation    Post pain: adequate analgesia    Post assessment: no apparent anesthetic complications and tolerated procedure well    Post vital signs: stable    Level of consciousness: awake, alert and oriented    Nausea/Vomiting: no nausea/vomiting    Complications: none    Transfer of care protocol was followed      Last vitals:   Visit Vitals  /79 (BP Location: Right arm, Patient Position: Lying)   Pulse 100   Temp 36.7 °C (98.1 °F) (Oral)   Resp 17   Ht 5' 5" (1.651 m)   Wt 73.4 kg (161 lb 13.1 oz)   LMP 12/24/2012   SpO2 96%   Breastfeeding No   BMI 26.93 kg/m²     "

## 2020-08-04 NOTE — ASSESSMENT & PLAN NOTE
Mild withdrawals  No tremors today  Will consider tapering dose today  Change banana bag to oral multivitamins/folic acid/thiamine to start after EGD or in AM

## 2020-08-04 NOTE — [employer]ATION PATIENT INSTRUCTIONS
Discharge Summary/Instructions after an Endoscopic Procedure  Patient Name: Alicia Chu  Patient MRN: [id number]  Patient YOB: 1960 Tuesday, August 4, 2020  Jose F Oreilly MD  RESTRICTIONS:  During your procedure today, you received medications for sedation.  These   medications may affect your judgment, balance and coordination.  Therefore,   for 24 hours, you have the following restrictions:   - DO NOT drive a car, operate machinery, make legal/financial decisions,   sign important papers or drink alcohol.    ACTIVITY:  Today: no heavy lifting, straining or running due to procedural   sedation/anesthesia.  The following day: return to full activity including work.  DIET:  Eat and drink normally unless instructed otherwise.     TREATMENT FOR COMMON SIDE EFFECTS:  - Mild abdominal pain, nausea, belching, bloating or excessive gas:  rest,   eat lightly and use a heating pad.  - Sore Throat: treat with throat lozenges and/or gargle with warm salt   water.  - Because air was used during the procedure, expelling large amounts of air   from your rectum or belching is normal.  - If a bowel prep was taken, you may not have a bowel movement for 1-3 days.    This is normal.  SYMPTOMS TO WATCH FOR AND REPORT TO YOUR PHYSICIAN:  1. Abdominal pain or bloating, other than gas cramps.  2. Chest pain.  3. Back pain.  4. Signs of infection such as: chills or fever occurring within 24 hours   after the procedure.  5. Rectal bleeding, which would show as bright red, maroon, or black stools.   (A tablespoon of blood from the rectum is not serious, especially if   hemorrhoids are present.)  6. Vomiting.  7. Weakness or dizziness.  GO DIRECTLY TO THE NEAREST EMERGENCY ROOM IF YOU HAVE ANY OF THE FOLLOWING:      Difficulty breathing              Chills and/or fever over 101 F   Persistent vomiting and/or vomiting blood   Severe abdominal pain   Severe chest pain   Black, tarry stools   Bleeding- more than one  tablespoon   Any other symptom or condition that you feel may need urgent attention  Your doctor recommends these additional instructions:  If any biopsies were taken, your doctors clinic will contact you in 1 to 2   weeks with any results.  - Return patient to [facility] for [facility] care.   - Full liquid diet today.   - PPI BID for one month, then daily.   - No NSAIDS.   - Repeat upper endoscopy in 2 months for surveillance.   - Will have her follow up in the office in 2 weeks - call with questions.   - The findings and recommendations were discussed with the patient.  For questions, problems or results please call your physician - Jose F Oreilly MD at Work:  [phone].  [facility] can be reached at [phone]     IF A COMPLICATION OR EMERGENCY SITUATION ARISES AND YOU ARE UNABLE TO REACH   YOUR PHYSICIAN - GO DIRECTLY TO THE EMERGENCY ROOM.  MD Jose F Mendieta MD  8/4/2020 12:20:08 PM  This report has been verified and signed electronically.  [employer]

## 2020-08-04 NOTE — OR NURSING
Procedure and recovery completed . Dr. Oreilly discussed results with patient. Report called to RIGOBERTO Mosqueda. Patient incontinent  Pennie care given . Patient ready for transport.

## 2020-08-04 NOTE — NURSING
Per handoff received from Viky VACA RN. Patient care assumed. Patient observed lying in bed with cardiac monitoring in progress. Vital signs obtained and can be found in flow sheets with complete patient assessment. Skin dry and intact with a 20g LA PIV with normal saline infusing at 100cc/hr. Complaints of mild abdominal cramping noted but patient in NAD. Plan to continue IV fluids, provide prn antiemetics for nausea, and maintain npo status after midnight for possible EGD in the am. Patient updated on plan of care and verbalized understanding. Call light in reach and patient instructed to inform the nurse if anything is needed. Patient stable and will continue to be monitored.

## 2020-08-04 NOTE — SUBJECTIVE & OBJECTIVE
Interval History: no tremors today. Feels well. Potassium 3.1. Mag level pending. Hgb 10 from 11 yesterday. BP stable.     Review of Systems   Respiratory: Negative.    Cardiovascular: Negative.    Gastrointestinal: Negative.      Objective:     Vital Signs (Most Recent):  Temp: 97.7 °F (36.5 °C) (08/04/20 0740)  Pulse: 90 (08/04/20 0740)  Resp: 16 (08/04/20 0740)  BP: 133/86 (08/04/20 0740)  SpO2: 98 % (08/04/20 0740) Vital Signs (24h Range):  Temp:  [97.7 °F (36.5 °C)-99 °F (37.2 °C)] 97.7 °F (36.5 °C)  Pulse:  [] 90  Resp:  [16-26] 16  SpO2:  [95 %-100 %] 98 %  BP: (133-174)/(77-99) 133/86     Weight: 73.4 kg (161 lb 13.1 oz)  Body mass index is 26.93 kg/m².    Intake/Output Summary (Last 24 hours) at 8/4/2020 0922  Last data filed at 8/4/2020 0600  Gross per 24 hour   Intake 1350 ml   Output --   Net 1350 ml      Physical Exam  Vitals signs and nursing note reviewed.   Constitutional:       General: She is not in acute distress.     Appearance: She is not toxic-appearing.   Cardiovascular:      Rate and Rhythm: Normal rate.   Pulmonary:      Effort: Pulmonary effort is normal.      Breath sounds: No wheezing or rales.   Abdominal:      General: Abdomen is flat. Bowel sounds are normal.      Palpations: Abdomen is soft.   Skin:     General: Skin is warm and dry.      Capillary Refill: Capillary refill takes less than 2 seconds.   Neurological:      Mental Status: She is alert and oriented to person, place, and time.      Comments: No tremors   Psychiatric:         Mood and Affect: Mood normal.         Thought Content: Thought content normal.         Judgment: Judgment normal.         Significant Labs: All pertinent labs within the past 24 hours have been reviewed.    Significant Imaging: I have reviewed all pertinent imaging results/findings within the past 24 hours.  I have reviewed and interpreted all pertinent imaging results/findings within the past 24 hours.

## 2020-08-04 NOTE — ASSESSMENT & PLAN NOTE
Concern for ulceration to anastomotic site vs gastric ulcer vs other potential pathology   Plan for EGD today. Continue pantoprazole 40 mg IV BID  Is NPO. Will replace potassium. Mag level pending  Continue intravenous fluids  Appreciate further gastroenterology recs

## 2020-08-04 NOTE — HOSPITAL COURSE
Ms Leary presented with melena in setting of history of Keyshawn-en-Y gastric bypass and alcohol consumption. Patient also presented with acute alcoholic hepatitis and mild alcohol withdrawals. Placed on pantoprazole 40 mg IV BID, low dose diazepam, changed to NPO status and gastroenterology consulted. Also placed on intravenous fluid. Liver enzymes/TBil improved on a daily basis with supportive treatment. Underwent EGD. This showed. Non-bleeding clean based gastric ulcer with no stigmata of bleeding at G-J anastomosis. Gastric remnant biopsied. Results are pending. No evidence of active bleeding at this point. Will continue with pantoprazole 40 mg BID and upgrade diet as tolerated. Hyperbilirubinemia and elevation of ALT resolved. AST just 44 prior to discharge. Weaned off diazepam. Withdrawals resolved. Patient very motivated to quit drinking again and would like to see a Psychiatrist before she is discharged to discuss Vivitrol. Consult placed. Patient will need pantoprazole 40 mg BID for at least 2 months. GI added sucralfate which was also prescribed. Mag and potassium supplementation for at least a week. MVI, thiamine and folic acid. Activity as tolerated. F/u with GI.   
[FreeTextEntry1] : 82 y/o female, presents for f/u. Mild AI. No chest pain or dyspnea. ECG - unchanged. Labs reviewed - c/w CLL. No new complaints.

## 2020-08-04 NOTE — PLAN OF CARE
Problem: Bleeding (Gastrointestinal Bleeding)  Goal: Hemostasis  Intervention: Manage Gastrointestinal Bleeding  Flowsheets (Taken 8/4/2020 0224)  Bleeding Management: (hemoglobin and hematocrit levels monitored) other (see comments)     Problem: Adult Inpatient Plan of Care  Goal: Plan of Care Review  Flowsheets (Taken 8/4/2020 0224)  Plan of Care Reviewed With: patient     Problem: Alcohol Withdrawal  Goal: Alcohol Withdrawal Symptom Control  Intervention: Manage Addictive Behavior (Brief Intervention)  Flowsheets (Taken 8/4/2020 0226)  Supportive Measures:   active listening utilized   positive reinforcement provided   goal setting facilitated   relaxation techniques promoted   self-care encouraged   verbalization of feelings encouraged     Patient remained free of injury throughout the shift. Vital signs remained WNL. Mild complaints of medial abdominal pain noted. Nausea noted to be continuous and was treated with prn Zofran with mild relief noted. GI consulted. NPO status maintained after midnight for scheduled EGD. Patient updated on plan of care and verbalized understanding. Bed alarm maintained for patient safety.

## 2020-08-04 NOTE — ASSESSMENT & PLAN NOTE
Likely due to acute alcohol hepatitis  Improving with supportive treatment and abdominal exam benign  Consider imaging if unchanged or worsens

## 2020-08-04 NOTE — PROGRESS NOTES
[facility] WRITTEN HEALTHCARE AND DISCHARGE INFORMATION.  Follow-up Information     Thony Lange MD.    Specialty: Gastroenterology  Why: [facility] does not accept patient's insurance.  Will need referral with [facility] for GI when seen  Contact information:  [address] [place] [address]  [id number]  [facility]  [place] [address]  [phone]             [facility] In 1 week.    Why: out patient services:  Emeli says new patient  will call patient to set up an appointment  Contact information:  [address]  [place] [address]  [phone]                  Help at Home           [phone]  After discharge for assistance [facility] On Call Nurse Care Line 24/7  Assistance   Things You are responsible For To Manage Your Care At Home:  1.    Getting your prescriptions filled   2.    Taking your medications as directed, DO NOT MISS ANY DOSES!  3.    Going to your follow-up doctor appointment. This is important because it  allow the doctor to monitor your progress and determine if  any changes need to made to your treatment plan.   Thank you for choosing [facility] for your care.  Please answer any calls you may receive from [facility] we want to continue to support you as you manage your healthcare needs. [facility] is happy to have the opportunity to serve you.    Sincerely,  Your [facility] Team,  Kamla Eid RN, BSN, STN [facility]  8/4/2020  [phone]

## 2020-08-04 NOTE — ASSESSMENT & PLAN NOTE
History noted  Concern for ulceration of anastomosis vs other etiology for bleeding. EGD planned for today

## 2020-08-04 NOTE — ANESTHESIA PREPROCEDURE EVALUATION
"08/04/2020    Pre-operative evaluation for Procedure(s) (LRB):  EGD (ESOPHAGOGASTRODUODENOSCOPY) (N/A)    Alicia Chu is a 59 y.o. female     Patient Active Problem List   Diagnosis    Gastric bypass status for obesity    Colon polyps    Essential hypertension    GI bleed    Acute post-hemorrhagic anemia    History of Keyshawn-en-Y gastric bypass    Abdominal pain    Acute upper GI bleeding    Iron deficiency anemia    Upper GI bleed    Alcohol use disorder, severe, dependence    Hyperbilirubinemia    Acute alcoholic hepatitis    PUD (peptic ulcer disease)    Alcohol withdrawal syndrome without complication       Review of patient's allergies indicates:   Allergen Reactions    Aspirin      Due to gastric bypass    Ibuprofen      Other reaction(s): Due To Gastric Bypass  Due to gastric bypass    Inderal [propranolol]      "Felt bizarre"       No current facility-administered medications on file prior to encounter.      Current Outpatient Medications on File Prior to Encounter   Medication Sig Dispense Refill    FLUoxetine 20 MG capsule Take 1 capsule (20 mg total) by mouth every evening. Also taking Fluoxetine 40 mg QAM 90 capsule 0    FLUoxetine 40 MG capsule Take 1 capsule (40 mg total) by mouth once daily. Also taking fluoxetine 20 mg QPM 90 capsule 0    hydrOXYzine pamoate (VISTARIL) 25 MG Cap       pantoprazole (PROTONIX) 40 MG tablet Take 1 tablet PO BID x 2 weeks, then 1 tablet PO QD 45 tablet 0    trazodone HCl (TRAZODONE ORAL)        VITALS  Vitals:    08/04/20 1221   BP: 118/79   Pulse: 100   Resp: 17   Temp: 36.7 °C (98.1 °F)       LABS  CBC:   Recent Labs     08/03/20  1756 08/04/20  0439   WBC 4.24 3.02*   RBC 3.97* 3.56*   HGB 11.2* 10.0*   HCT 35.7* 32.5*    124*   MCV 90 91   MCH 28.2 28.1   MCHC 31.4* 30.8*       CMP:   Recent Labs     08/03/20  1145 08/04/20  0439    136   K 4.3 3.1*   CL 94* 100   CO2 20* 28   BUN 12 9   CREATININE 0.9 0.8   * 107   CALCIUM " 9.0 8.1*   ALBUMIN 3.7 2.7*   PROT 6.9 5.4*   ALKPHOS 287* 226*   ALT 96* 51*   * 77*   BILITOT 2.7* 1.2*       INR  Recent Labs     08/03/20  1145   INR 1.0       Anesthesia Evaluation    I have reviewed the Patient Summary Reports.    I have reviewed the Nursing Notes.    I have reviewed the Medications.     Review of Systems  Anesthesia Hx:  No problems with previous Anesthesia Denies Hx of Anesthetic complications  Neg history of prior surgery. Denies Family Hx of Anesthesia complications.   Denies Personal Hx of Anesthesia complications.   Social:  Non-Smoker, No Alcohol Use    Hematology/Oncology:  Hematology Normal   Oncology Normal     EENT/Dental:EENT/Dental Normal   Cardiovascular:   Exercise tolerance: good Hypertension    Pulmonary:  Pulmonary Normal    Renal/:  Renal/ Normal     Hepatic/GI:   PUD, GERD    Musculoskeletal:  Musculoskeletal Normal    Neurological:  Neurology Normal    Endocrine:  Endocrine Normal    Dermatological:  Skin Normal    Psych:  Psychiatric Normal           Physical Exam  General:  Well nourished    Airway/Jaw/Neck:  Airway Findings: Mouth Opening: Normal General Airway Assessment: Adult  Mallampati: II  TM Distance: Normal, at least 6 cm         Dental:  DENTAL FINDINGS: Normal   Chest/Lungs:  Chest/Lungs Findings: Normal Respiratory Rate     Heart/Vascular:  Heart Findings: Normal Heart murmur: negative       Mental Status:  Mental Status Findings:  Cooperative, Alert and Oriented         Anesthesia Plan  Type of Anesthesia, risks & benefits discussed:  Anesthesia Type:  general  Patient's Preference:   Intra-op Monitoring Plan: standard ASA monitors  Intra-op Monitoring Plan Comments:   Post Op Pain Control Plan: per primary service following discharge from PACU  Post Op Pain Control Plan Comments:   Induction:   IV  Beta Blocker:  Patient is not currently on a Beta-Blocker (No further documentation required).       Informed Consent: Patient understands risks and  agrees with Anesthesia plan.  Questions answered. Anesthesia consent signed with patient.  ASA Score: 2     Day of Surgery Review of History & Physical: I have interviewed and examined the patient. I have reviewed the patient's H&P dated:  There are no significant changes.          Ready For Surgery From Anesthesia Perspective.

## 2020-08-05 PROBLEM — K28.9 ULCER AT SITE OF SURGICAL ANASTOMOSIS FOLLOWING BYPASS OF STOMACH: Status: ACTIVE | Noted: 2020-08-03

## 2020-08-05 LAB
ALBUMIN SERPL BCP-MCNC: 2.6 G/DL (ref 3.5–5.2)
ALP SERPL-CCNC: 207 U/L (ref 55–135)
ALT SERPL W/O P-5'-P-CCNC: 35 U/L (ref 10–44)
ANION GAP SERPL CALC-SCNC: 9 MMOL/L (ref 8–16)
AST SERPL-CCNC: 44 U/L (ref 10–40)
BACTERIA UR CULT: ABNORMAL
BASOPHILS # BLD AUTO: 0.03 K/UL (ref 0–0.2)
BASOPHILS NFR BLD: 0.8 % (ref 0–1.9)
BILIRUB SERPL-MCNC: 0.7 MG/DL (ref 0.1–1)
BUN SERPL-MCNC: 5 MG/DL (ref 6–20)
CALCIUM SERPL-MCNC: 8.1 MG/DL (ref 8.7–10.5)
CHLORIDE SERPL-SCNC: 104 MMOL/L (ref 95–110)
CO2 SERPL-SCNC: 25 MMOL/L (ref 23–29)
CREAT SERPL-MCNC: 0.8 MG/DL (ref 0.5–1.4)
DIFFERENTIAL METHOD: ABNORMAL
EOSINOPHIL # BLD AUTO: 0 K/UL (ref 0–0.5)
EOSINOPHIL NFR BLD: 1 % (ref 0–8)
ERYTHROCYTE [DISTWIDTH] IN BLOOD BY AUTOMATED COUNT: 19.5 % (ref 11.5–14.5)
EST. GFR  (AFRICAN AMERICAN): >60 ML/MIN/1.73 M^2
EST. GFR  (NON AFRICAN AMERICAN): >60 ML/MIN/1.73 M^2
GLUCOSE SERPL-MCNC: 104 MG/DL (ref 70–110)
HCT VFR BLD AUTO: 33.9 % (ref 37–48.5)
HGB BLD-MCNC: 10.3 G/DL (ref 12–16)
IMM GRANULOCYTES # BLD AUTO: 0.01 K/UL (ref 0–0.04)
IMM GRANULOCYTES NFR BLD AUTO: 0.3 % (ref 0–0.5)
LYMPHOCYTES # BLD AUTO: 1.1 K/UL (ref 1–4.8)
LYMPHOCYTES NFR BLD: 26.6 % (ref 18–48)
MCH RBC QN AUTO: 28.1 PG (ref 27–31)
MCHC RBC AUTO-ENTMCNC: 30.4 G/DL (ref 32–36)
MCV RBC AUTO: 93 FL (ref 82–98)
MONOCYTES # BLD AUTO: 0.4 K/UL (ref 0.3–1)
MONOCYTES NFR BLD: 9.1 % (ref 4–15)
NEUTROPHILS # BLD AUTO: 2.5 K/UL (ref 1.8–7.7)
NEUTROPHILS NFR BLD: 62.2 % (ref 38–73)
NRBC BLD-RTO: 0 /100 WBC
PLATELET # BLD AUTO: 119 K/UL (ref 150–350)
PMV BLD AUTO: 11.6 FL (ref 9.2–12.9)
POTASSIUM SERPL-SCNC: 3.2 MMOL/L (ref 3.5–5.1)
PROT SERPL-MCNC: 5.2 G/DL (ref 6–8.4)
RBC # BLD AUTO: 3.66 M/UL (ref 4–5.4)
SODIUM SERPL-SCNC: 138 MMOL/L (ref 136–145)
WBC # BLD AUTO: 3.95 K/UL (ref 3.9–12.7)

## 2020-08-05 PROCEDURE — 25000003 PHARM REV CODE 250: Performed by: INTERNAL MEDICINE

## 2020-08-05 PROCEDURE — 85025 COMPLETE CBC W/AUTO DIFF WBC: CPT

## 2020-08-05 PROCEDURE — 21400001 HC TELEMETRY ROOM

## 2020-08-05 PROCEDURE — 36415 COLL VENOUS BLD VENIPUNCTURE: CPT

## 2020-08-05 PROCEDURE — 63600175 PHARM REV CODE 636 W HCPCS: Performed by: HOSPITALIST

## 2020-08-05 PROCEDURE — C9113 INJ PANTOPRAZOLE SODIUM, VIA: HCPCS | Performed by: HOSPITALIST

## 2020-08-05 PROCEDURE — 80053 COMPREHEN METABOLIC PANEL: CPT

## 2020-08-05 PROCEDURE — 25000003 PHARM REV CODE 250: Performed by: HOSPITALIST

## 2020-08-05 RX ORDER — POTASSIUM CHLORIDE 20 MEQ/1
20 TABLET, EXTENDED RELEASE ORAL 2 TIMES DAILY
Status: DISCONTINUED | OUTPATIENT
Start: 2020-08-05 | End: 2020-08-06 | Stop reason: HOSPADM

## 2020-08-05 RX ORDER — LANOLIN ALCOHOL/MO/W.PET/CERES
400 CREAM (GRAM) TOPICAL 2 TIMES DAILY
Status: DISCONTINUED | OUTPATIENT
Start: 2020-08-05 | End: 2020-08-06 | Stop reason: HOSPADM

## 2020-08-05 RX ADMIN — POTASSIUM CHLORIDE 20 MEQ: 1500 TABLET, EXTENDED RELEASE ORAL at 09:08

## 2020-08-05 RX ADMIN — FLUOXETINE 40 MG: 20 CAPSULE ORAL at 08:08

## 2020-08-05 RX ADMIN — SUCRALFATE 1 G: 1 SUSPENSION ORAL at 09:08

## 2020-08-05 RX ADMIN — SUCRALFATE 1 G: 1 SUSPENSION ORAL at 12:08

## 2020-08-05 RX ADMIN — SUCRALFATE 1 G: 1 SUSPENSION ORAL at 05:08

## 2020-08-05 RX ADMIN — ONDANSETRON 8 MG: 8 TABLET, ORALLY DISINTEGRATING ORAL at 08:08

## 2020-08-05 RX ADMIN — FOLIC ACID 1 MG: 1 TABLET ORAL at 08:08

## 2020-08-05 RX ADMIN — POTASSIUM CHLORIDE 20 MEQ: 1500 TABLET, EXTENDED RELEASE ORAL at 08:08

## 2020-08-05 RX ADMIN — PANTOPRAZOLE SODIUM 40 MG: 40 INJECTION, POWDER, FOR SOLUTION INTRAVENOUS at 10:08

## 2020-08-05 RX ADMIN — ONDANSETRON 8 MG: 8 TABLET, ORALLY DISINTEGRATING ORAL at 09:08

## 2020-08-05 RX ADMIN — SUCRALFATE 1 G: 1 SUSPENSION ORAL at 06:08

## 2020-08-05 RX ADMIN — DIAZEPAM 5 MG: 5 TABLET ORAL at 08:08

## 2020-08-05 RX ADMIN — Medication 100 MG: at 08:08

## 2020-08-05 RX ADMIN — SODIUM CHLORIDE 1000 ML: 9 INJECTION, SOLUTION INTRAVENOUS at 09:08

## 2020-08-05 RX ADMIN — Medication 400 MG: at 08:08

## 2020-08-05 RX ADMIN — DIAZEPAM 5 MG: 5 TABLET ORAL at 09:08

## 2020-08-05 RX ADMIN — THERA TABS 1 TABLET: TAB at 08:08

## 2020-08-05 RX ADMIN — Medication 400 MG: at 10:08

## 2020-08-05 NOTE — ASSESSMENT & PLAN NOTE
Mild withdrawals  Stop further benzodiazepines for now and watch  Oral multivitamins/folic acid/thiamine

## 2020-08-05 NOTE — ASSESSMENT & PLAN NOTE
EGD: Non-bleeding clean based gastric ulcer with no stigmata of bleeding at G-J anastomosis. Gastric remnant biopsied. Results are pending  No evidence of active bleeding at this point. Will continue with pantoprazole 40 mg BID and upgrade diet as tolerated  Avoid NSAIDs and especially alcohol consumption. Patient is motivated to quit  Vitals every 4 hours. Give more fluids for lightheadedness

## 2020-08-05 NOTE — PROGRESS NOTES
[facility] Hospital Medicine  Progress Note    Patient Name: Alicia Chu  MRN: [id number]  Patient Class: IP- Inpatient   Admission Date: 8/3/2020  Length of Stay: 2 days  Attending Physician: Leonila Alonzo MD  Primary Care Provider: Primary Doctor No        Subjective:     Principal Problem:Acute upper GI bleeding        HPI:  Mrs. Chu is a 59 year old woman with history of peptic ulcer disease, gastric bypass in 2006 and alcoholism who presented for evaluation of abdominal pain, nausea and black stool.  She notes that the black stool was first noticed two days ago.  Over the last 24 hours the stool has become thick and tarry.  She has had six black tarry stools in the last 24 hours.  She notes this is associated with abdominal pain that started 4 days ago.  The pain is burning in sensation and located in her mid upper and lower abdomen.  It is non-radiating.  She reports associated cough and nausea for the last two days as well.  She does not take NSAIDs but is an alcoholic.  After 10 months of sobriety she relapsed in April of this year.  Currently she is drinking one small bottle of wine (10oz) and four strong vodka cocktails daily.  Her last drink was last night at 9PM.  She states she has not gone this long with alcohol since April and feels very anxious and tremulous.  She has had alcohol withdrawal before, but she states it has never been this bad.  She denies fevers, chills, vomiting, constipation, diarrhea, chest pain, numbness, tingling or focal weakness.    Overview/Hospital Course:  Ms Leary presented with melena in setting of history of Keyshawn-en-Y gastric bypass and alcohol consumption. Patient also presented with acute alcoholic hepatitis and mild alcohol withdrawals. Placed on pantoprazole 40 mg IV BID, low dose diazepam, changed to NPO status and gastroenterology consulted. Also placed on intravenous fluid. Liver enzymes/TBil improved on a daily basis with supportive  "treatment.     Interval History: felt lightheaded when getting out of bed to commode today. Also with lower abdominal "crampy" pain that resolves with having a BM. Per patient, BM was reported as "dark". Hgb is stable for the last 2 days. A Non-bleeding clean based gastric ulcer with no stigmata of bleeding at G-J anastomosis seen on EGD    Review of Systems   Respiratory: Negative.    Cardiovascular: Negative.    Gastrointestinal: Positive for abdominal pain.   Neurological: Negative for light-headedness.     Objective:     Vital Signs (Most Recent):  Temp: 98.1 °F (36.7 °C) (08/05/20 0738)  Pulse: 79 (08/05/20 0738)  Resp: 16 (08/05/20 0738)  BP: 136/89 (08/05/20 0738)  SpO2: 97 % (08/05/20 0738) Vital Signs (24h Range):  Temp:  [97.9 °F (36.6 °C)-98.8 °F (37.1 °C)] 98.1 °F (36.7 °C)  Pulse:  [] 79  Resp:  [16-20] 16  SpO2:  [95 %-98 %] 97 %  BP: (118-145)/(76-89) 136/89     Weight: 70.3 kg (154 lb 15.7 oz)  Body mass index is 25.79 kg/m².    Intake/Output Summary (Last 24 hours) at 8/5/2020 0914  Last data filed at 8/4/2020 1730  Gross per 24 hour   Intake 100 ml   Output --   Net 100 ml      Physical Exam  Vitals signs and nursing note reviewed.   Constitutional:       General: She is not in acute distress.     Appearance: She is not toxic-appearing.   Cardiovascular:      Rate and Rhythm: Normal rate and regular rhythm.   Pulmonary:      Effort: Pulmonary effort is normal.   Abdominal:      General: Abdomen is flat. Bowel sounds are normal.      Palpations: Abdomen is soft.   Skin:     Coloration: Skin is not jaundiced or pale.   Neurological:      Mental Status: She is alert and oriented to person, place, and time.      Comments: No tremors at rest but mild with active ROM   Psychiatric:         Mood and Affect: Mood normal.         Behavior: Behavior normal.         Thought Content: Thought content normal.         Judgment: Judgment normal.         Significant Labs: All pertinent labs within the past 24 " hours have been reviewed.    Significant Imaging: I have reviewed all pertinent imaging results/findings within the past 24 hours.  I have reviewed and interpreted all pertinent imaging results/findings within the past 24 hours.      Assessment/Plan:      * Acute upper GI bleeding  EGD: Non-bleeding clean based gastric ulcer with no stigmata of bleeding at G-J anastomosis. Gastric remnant biopsied. Results are pending  No evidence of active bleeding at this point. Will continue with pantoprazole 40 mg BID and upgrade diet as tolerated  Avoid NSAIDs and especially alcohol consumption. Patient is motivated to quit  Vitals every 4 hours. Give more fluids for lightheadedness       Ulcer at site of surgical anastomosis following bypass of stomach  As above      Acute alcoholic hepatitis  Likely related to alcohol consumption based on ratio of ast/alt  Labwork improving with supportive treatment and abstinence from alcohol  Almost resolved. Hold off on imaging at this time      Alcohol withdrawal syndrome without complication  Mild withdrawals  Stop further benzodiazepines for now and watch  Oral multivitamins/folic acid/thiamine      Hyperbilirubinemia  Likely due to acute alcohol hepatitis  Resolved       Alcohol use disorder, severe, dependence  Was sober after rehab for 10 months but relapsed in April  Has been drinking a 10oz bottle of wine and 4 strong vodka cocktails daily, per patient  Spent > 5 minutes discussing alcohol cessation      History of Keyshawn-en-Y gastric bypass  History noted        Essential hypertension  Not on treatment at home.  Currently stable without antihypertensive therapy  On treatment for alcohol withdrawals      VTE Risk Mitigation (From admission, onward)         Ordered     Place sequential compression device  Until discontinued      08/03/20 1607     IP VTE LOW RISK PATIENT  Once      08/03/20 1607     Place sequential compression device  Until discontinued      08/03/20 1607                       Leonila Mary MD  Department of Hospital Medicine   [facility]

## 2020-08-05 NOTE — PLAN OF CARE
Problem: Adult Inpatient Plan of Care  Goal: Plan of Care Review  Flowsheets (Taken 8/4/2020 0194)  Plan of Care Reviewed With: patient     Patient remained free of injury throughout the shift. Vital signs remained WNL. Complaints of abdominal cramping and nausea noted and treated with mild relief obtained for both. Gastroenterology consulted. Plan to continue IV fluids, monitor and manage pain, and await further input from the provider. Patient updated on plan of care and verbalized understanding. Call light in reach and bed alarm maintained for patient safety.

## 2020-08-05 NOTE — SUBJECTIVE & OBJECTIVE
"Interval History: felt lightheaded when getting out of bed to commode today. Also with lower abdominal "crampy" pain that resolves with having a BM. Per patient, BM was reported as "dark". Hgb is stable for the last 2 days. A Non-bleeding clean based gastric ulcer with no stigmata of bleeding at G-J anastomosis seen on EGD    Review of Systems   Respiratory: Negative.    Cardiovascular: Negative.    Gastrointestinal: Positive for abdominal pain.   Neurological: Negative for light-headedness.     Objective:     Vital Signs (Most Recent):  Temp: 98.1 °F (36.7 °C) (08/05/20 0738)  Pulse: 79 (08/05/20 0738)  Resp: 16 (08/05/20 0738)  BP: 136/89 (08/05/20 0738)  SpO2: 97 % (08/05/20 0738) Vital Signs (24h Range):  Temp:  [97.9 °F (36.6 °C)-98.8 °F (37.1 °C)] 98.1 °F (36.7 °C)  Pulse:  [] 79  Resp:  [16-20] 16  SpO2:  [95 %-98 %] 97 %  BP: (118-145)/(76-89) 136/89     Weight: 70.3 kg (154 lb 15.7 oz)  Body mass index is 25.79 kg/m².    Intake/Output Summary (Last 24 hours) at 8/5/2020 0914  Last data filed at 8/4/2020 1730  Gross per 24 hour   Intake 100 ml   Output --   Net 100 ml      Physical Exam  Vitals signs and nursing note reviewed.   Constitutional:       General: She is not in acute distress.     Appearance: She is not toxic-appearing.   Cardiovascular:      Rate and Rhythm: Normal rate and regular rhythm.   Pulmonary:      Effort: Pulmonary effort is normal.   Abdominal:      General: Abdomen is flat. Bowel sounds are normal.      Palpations: Abdomen is soft.   Skin:     Coloration: Skin is not jaundiced or pale.   Neurological:      Mental Status: She is alert and oriented to person, place, and time.      Comments: No tremors at rest but mild with active ROM   Psychiatric:         Mood and Affect: Mood normal.         Behavior: Behavior normal.         Thought Content: Thought content normal.         Judgment: Judgment normal.         Significant Labs: All pertinent labs within the past 24 hours have " been reviewed.    Significant Imaging: I have reviewed all pertinent imaging results/findings within the past 24 hours.  I have reviewed and interpreted all pertinent imaging results/findings within the past 24 hours.

## 2020-08-05 NOTE — ASSESSMENT & PLAN NOTE
Likely related to alcohol consumption based on ratio of ast/alt  Labwork improving with supportive treatment and abstinence from alcohol  Almost resolved. Hold off on imaging at this time

## 2020-08-06 VITALS
OXYGEN SATURATION: 99 % | WEIGHT: 158.94 LBS | BODY MASS INDEX: 26.48 KG/M2 | HEART RATE: 70 BPM | SYSTOLIC BLOOD PRESSURE: 137 MMHG | HEIGHT: 65 IN | TEMPERATURE: 98 F | RESPIRATION RATE: 18 BRPM | DIASTOLIC BLOOD PRESSURE: 62 MMHG

## 2020-08-06 PROBLEM — F10.94 ALCOHOL-INDUCED MOOD DISORDER: Status: ACTIVE | Noted: 2020-08-06

## 2020-08-06 LAB
COMMENT: NORMAL
FINAL PATHOLOGIC DIAGNOSIS: NORMAL
GROSS: NORMAL

## 2020-08-06 PROCEDURE — 90792 PSYCH DIAG EVAL W/MED SRVCS: CPT | Mod: AF,HB,S$PBB, | Performed by: PSYCHIATRY & NEUROLOGY

## 2020-08-06 PROCEDURE — 25000003 PHARM REV CODE 250: Performed by: INTERNAL MEDICINE

## 2020-08-06 PROCEDURE — 25000003 PHARM REV CODE 250: Performed by: HOSPITALIST

## 2020-08-06 PROCEDURE — 90792 PR PSYCHIATRIC DIAGNOSTIC EVALUATION W/MEDICAL SERVICES: ICD-10-PCS | Mod: AF,HB,S$PBB, | Performed by: PSYCHIATRY & NEUROLOGY

## 2020-08-06 RX ORDER — PANTOPRAZOLE SODIUM 40 MG/1
40 TABLET, DELAYED RELEASE ORAL 2 TIMES DAILY
Status: DISCONTINUED | OUTPATIENT
Start: 2020-08-06 | End: 2020-08-06 | Stop reason: HOSPADM

## 2020-08-06 RX ORDER — NALTREXONE HYDROCHLORIDE 50 MG/1
50 TABLET, FILM COATED ORAL DAILY
Qty: 30 TABLET | Refills: 0 | Status: SHIPPED | OUTPATIENT
Start: 2020-08-06 | End: 2020-09-05

## 2020-08-06 RX ORDER — BUPROPION HYDROCHLORIDE 100 MG/1
100 TABLET ORAL DAILY
Qty: 30 TABLET | Refills: 0 | Status: SHIPPED | OUTPATIENT
Start: 2020-08-06 | End: 2020-11-16

## 2020-08-06 RX ORDER — LANOLIN ALCOHOL/MO/W.PET/CERES
400 CREAM (GRAM) TOPICAL 2 TIMES DAILY
Refills: 0 | COMMUNITY
Start: 2020-08-06 | End: 2020-08-13

## 2020-08-06 RX ORDER — SUCRALFATE 1 G/10ML
1 SUSPENSION ORAL
Qty: 1200 ML | Refills: 1 | Status: SHIPPED | OUTPATIENT
Start: 2020-08-06 | End: 2020-10-05

## 2020-08-06 RX ORDER — PANTOPRAZOLE SODIUM 40 MG/1
40 TABLET, DELAYED RELEASE ORAL 2 TIMES DAILY
Qty: 120 TABLET | Refills: 0 | Status: SHIPPED | OUTPATIENT
Start: 2020-08-06 | End: 2020-08-06 | Stop reason: SDUPTHER

## 2020-08-06 RX ORDER — FOLIC ACID 1 MG/1
1 TABLET ORAL DAILY
Refills: 0 | COMMUNITY
Start: 2020-08-07 | End: 2021-08-07

## 2020-08-06 RX ORDER — FLUOXETINE HYDROCHLORIDE 20 MG/1
60 CAPSULE ORAL DAILY
Qty: 90 CAPSULE | Refills: 0 | Status: SHIPPED | OUTPATIENT
Start: 2020-08-06 | End: 2020-09-23

## 2020-08-06 RX ORDER — PANTOPRAZOLE SODIUM 40 MG/1
40 TABLET, DELAYED RELEASE ORAL 2 TIMES DAILY
Qty: 120 TABLET | Refills: 0 | Status: SHIPPED | OUTPATIENT
Start: 2020-08-06 | End: 2020-10-05

## 2020-08-06 RX ORDER — POTASSIUM CHLORIDE 20 MEQ/1
20 TABLET, EXTENDED RELEASE ORAL DAILY
Qty: 7 TABLET | Refills: 0 | Status: SHIPPED | OUTPATIENT
Start: 2020-08-06 | End: 2020-08-13

## 2020-08-06 RX ORDER — LANOLIN ALCOHOL/MO/W.PET/CERES
100 CREAM (GRAM) TOPICAL DAILY
COMMUNITY
Start: 2020-08-07

## 2020-08-06 RX ADMIN — SUCRALFATE 1 G: 1 SUSPENSION ORAL at 06:08

## 2020-08-06 RX ADMIN — Medication 100 MG: at 09:08

## 2020-08-06 RX ADMIN — PANTOPRAZOLE SODIUM 40 MG: 40 TABLET, DELAYED RELEASE ORAL at 09:08

## 2020-08-06 RX ADMIN — DIAZEPAM 5 MG: 5 TABLET ORAL at 11:08

## 2020-08-06 RX ADMIN — Medication 400 MG: at 09:08

## 2020-08-06 RX ADMIN — FLUOXETINE 40 MG: 20 CAPSULE ORAL at 09:08

## 2020-08-06 RX ADMIN — POTASSIUM CHLORIDE 20 MEQ: 1500 TABLET, EXTENDED RELEASE ORAL at 09:08

## 2020-08-06 RX ADMIN — ONDANSETRON 8 MG: 8 TABLET, ORALLY DISINTEGRATING ORAL at 09:08

## 2020-08-06 RX ADMIN — FOLIC ACID 1 MG: 1 TABLET ORAL at 09:08

## 2020-08-06 RX ADMIN — SUCRALFATE 1 G: 1 SUSPENSION ORAL at 12:08

## 2020-08-06 RX ADMIN — THERA TABS 1 TABLET: TAB at 09:08

## 2020-08-06 NOTE — SUBJECTIVE & OBJECTIVE
Patient History           Medical as of 8/6/2020     Past Medical History     Diagnosis Date Comments Source    Addiction to drug -- -- Provider    Alcohol abuse -- -- Provider    Colon polyps -- -- Provider    Gastric bypass status for obesity 2006 -- Provider    History of psychiatric hospitalization -- -- Provider    Hx of psychiatric care -- -- Provider    Hypertension -- -- Provider    Postmenopausal HRT (hormone replacement therapy) -- -- Provider    Psychiatric problem -- -- Provider    Therapy -- -- Provider    Ulcer -- -- Provider          Pertinent Negatives     Diagnosis Date Noted Comments Source    Suicide attempt 08/06/2020 -- Provider                  Surgical as of 8/6/2020     Past Surgical History     Procedure Laterality Date Comments Source    CHOLECYSTECTOMY -- -- -- Provider    GASTRIC BYPASS -- -- zackary en Y Provider    HERNIA REPAIR -- x2 LI hernia Provider    KNEE ARTHROSCOPY W/ DEBRIDEMENT -- -- right Provider    INFECTED SKIN DEBRIDEMENT -- x3 -- Provider    BREAST BIOPSY -- 2005 right Provider    ABDOMINAL SURGERY -- -- -- Provider    ESOPHAGOGASTRODUODENOSCOPY N/A 4/18/2019 Procedure: EGD (ESOPHAGOGASTRODUODENOSCOPY);  Surgeon: Mony Plunkett MD;  Location: [facility];  Service: Endoscopy;  Laterality: N/A; Provider    HERNIA REPAIR -- -- -- Provider    ESOPHAGOGASTRODUODENOSCOPY N/A 8/4/2020 Procedure: EGD (ESOPHAGOGASTRODUODENOSCOPY);  Surgeon: Jose F Oreilly MD;  Location: [facility];  Service: Endoscopy;  Laterality: N/A; Provider                  Family as of 8/6/2020     Problem Relation Name Age of Onset Comments Source    Cancer Mother 61 -- colon Provider    Dementia Father -- -- -- Provider    Hypertension Father -- -- -- Provider    Hyperlipidemia Father -- -- -- Provider    Alzheimer's disease Father -- 78 -- Provider    Cancer Paternal Grandfather -- -- colon cancer Provider    Breast cancer Maternal Uncle -- -- -- Provider            Tobacco Use as of 8/6/2020     Smoking  Status Smoking Start Date Smoking Quit Date Packs/Day Years Used    Never Smoker -- -- -- --    Types Comments Smokeless Tobacco Status Smokeless Tobacco Quit Date Source    -- -- Never Used -- Provider            Alcohol Use as of 8/6/2020     Alcohol Use Drinks/Week Alcohol/Week Comments Source    Yes   0.0 standard drinks 1 small bottle wine daily (10oz) + 4 strong vodka cocktails daily Provider    Frequency Typical Drinks Binge Drinking        -- -- --              Drug Use as of 8/6/2020     Drug Use Types Frequency Comments Source    No -- -- -- Provider            Sexual Activity as of 8/6/2020     Sexually Active Birth Control Partners Comments Source    Yes None Male -- Provider            Activities of Daily Living as of 8/6/2020     Activities of Daily Living Question Response Comments Source    Patient feels they ought to cut down on drinking/drug use Not Asked -- Provider    Patient annoyed by others criticizing their drinking/drug use Not Asked -- Provider    Patient has felt bad or guilty about drinking/drug use Not Asked -- Provider    Patient has had a drink/used drugs as an eye opener in the AM Not Asked -- Provider            Social Documentation as of 8/6/2020    None           Occupational as of 8/6/2020    None           Socioeconomic as of 8/6/2020     Marital Status Spouse Name Number of Children Years Education Education Level Preferred Language Ethnicity Race Source     -- 0 -- -- English /White White Provider    Financial Resource Strain Food Insecurity: Worry Food Insecurity: Inability Transportation Needs: Medical Transportation Needs: Non-medical    -- -- -- -- --            Pertinent History     Question Response Comments    Lives with spouse --    Place in Birth Order -- --    Lives in home --    Number of Siblings -- --    Raised by -- Grew up in [place]    Legal Involvement -- --    Childhood Trauma -- --    Criminal History of -- --    Financial Status unemployed  --    Highest Level of Education -- --    Does patient have access to a firearm? -- --     Service No --    Primary Leisure Activity -- --    Spirituality -- --        Past Medical History:   Diagnosis Date    Addiction to drug     Alcohol abuse     Colon polyps     Gastric bypass status for obesity 2006    History of psychiatric hospitalization     Hx of psychiatric care     Hypertension     Postmenopausal HRT (hormone replacement therapy)     Psychiatric problem     Therapy     Ulcer      Past Surgical History:   Procedure Laterality Date    ABDOMINAL SURGERY      BREAST BIOPSY  2005    right    CHOLECYSTECTOMY      ESOPHAGOGASTRODUODENOSCOPY N/A 4/18/2019    Procedure: EGD (ESOPHAGOGASTRODUODENOSCOPY);  Surgeon: Mony Plunkett MD;  Location: [facility] ENDO;  Service: Endoscopy;  Laterality: N/A;    ESOPHAGOGASTRODUODENOSCOPY N/A 8/4/2020    Procedure: EGD (ESOPHAGOGASTRODUODENOSCOPY);  Surgeon: Jose F Oreilly MD;  Location: [facility] ENDO;  Service: Endoscopy;  Laterality: N/A;    GASTRIC BYPASS      zackary en Y    HERNIA REPAIR  x2    LI hernia    HERNIA REPAIR      INFECTED SKIN DEBRIDEMENT  x3    KNEE ARTHROSCOPY W/ DEBRIDEMENT      right     Family History     Problem Relation (Age of Onset)    Alzheimer's disease Father (78)    Breast cancer Maternal Uncle    Cancer Mother, Paternal Grandfather    Dementia Father    Hyperlipidemia Father    Hypertension Father        Tobacco Use    Smoking status: Never Smoker    Smokeless tobacco: Never Used   Substance and Sexual Activity    Alcohol use: Yes     Alcohol/week: 0.0 standard drinks     Comment: 1 small bottle wine daily (10oz) + 4 strong vodka cocktails daily    Drug use: No    Sexual activity: Yes     Partners: Male     Birth control/protection: None     Review of patient's allergies indicates:   Allergen Reactions    Aspirin      Due to gastric bypass    Ibuprofen      Other reaction(s): Due To Gastric Bypass  Due to gastric bypass     "Inderal [propranolol]      "Felt bizarre"       No current facility-administered medications on file prior to encounter.      Current Outpatient Medications on File Prior to Encounter   Medication Sig    FLUoxetine 20 MG capsule Take 1 capsule (20 mg total) by mouth every evening. Also taking Fluoxetine 40 mg QAM    FLUoxetine 40 MG capsule Take 1 capsule (40 mg total) by mouth once daily. Also taking fluoxetine 20 mg QPM    hydrOXYzine pamoate (VISTARIL) 25 MG Cap     trazodone HCl (TRAZODONE ORAL)     [DISCONTINUED] pantoprazole (PROTONIX) 40 MG tablet Take 1 tablet PO BID x 2 weeks, then 1 tablet PO QD     Psychotherapeutics (From admission, onward)    Start     Stop Route Frequency Ordered    08/04/20 0900  FLUoxetine capsule 40 mg      -- Oral Daily 08/03/20 1608    08/03/20 1607  diazePAM tablet 5 mg      -- Oral Every 6 hours PRN 08/03/20 1607        Review of Systems   Constitutional: Positive for fatigue. Negative for activity change.   Respiratory: Negative for shortness of breath.    Cardiovascular: Negative for chest pain.   Gastrointestinal: Positive for nausea.   Musculoskeletal: Positive for myalgias.   Psychiatric/Behavioral: Positive for dysphoric mood. Negative for decreased concentration, hallucinations, sleep disturbance and suicidal ideas. The patient is not nervous/anxious.      Strengths and Liabilities: Strength: Patient is motivated for change., Liability: Patient has no suport network., Liability: Patient lacks coping skills.    Objective:     Vital Signs (Most Recent):  Temp: 97.6 °F (36.4 °C) (08/06/20 1114)  Pulse: 77 (08/06/20 1114)  Resp: 18 (08/06/20 1114)  BP: (!) 158/97 (08/06/20 1114)  SpO2: 98 % (08/06/20 1114) Vital Signs (24h Range):  Temp:  [97.6 °F (36.4 °C)-98.5 °F (36.9 °C)] 97.6 °F (36.4 °C)  Pulse:  [69-87] 77  Resp:  [16-18] 18  SpO2:  [97 %-99 %] 98 %  BP: (119-160)/(70-99) 158/97     Height: 5' 5" (165.1 cm)  Weight: 72.1 kg (158 lb 15.2 oz)  Body mass index is " 26.45 kg/m².      Intake/Output Summary (Last 24 hours) at 8/6/2020 1536  Last data filed at 8/5/2020 1700  Gross per 24 hour   Intake 100 ml   Output --   Net 100 ml       Physical Exam  Psychiatric:      Comments: EXAMINATION    CONSTITUTIONAL  General Appearance:  Hospital attire    MUSCULOSKELETAL  Muscle Strength and Tone:  Normal  Abnormal Involuntary Movements:  None noted  Gait and Station:  Not observed    PSYCHIATRIC MENTAL STATUS EXAM   Level of Consciousness:  Awake and alert  Orientation:  Name, place, date, situation  Grooming:  Fair  Psychomotor Behavior:  Normal  Speech:  Normal rate, tone, volume  Language:  No abnormality  Mood:  Good  Affect:  Slightly blunted  Thought Process:  Linear  Associations:  Intact  Thought Content:  Denies suicidal/homicidal/psychosis  Memory:  Intact to recent and remote  Attention:  Full  Fund of Knowledge:  Appropriate for conversation  Insight:  Fair into alcohol use and mental illness  Judgment:  Fair into cooperation with care and outpatient follow-up            Significant Labs:   Last 24 Hours:   Recent Lab Results     None        All pertinent labs within the past 24 hours have been reviewed.    Significant Imaging: I have reviewed all pertinent imaging results/findings within the past 24 hours.

## 2020-08-06 NOTE — PLAN OF CARE
"EDUCATION:  SW provided patient with educational information on GI Disorders.  Information was reviewed and placed in "My Healthcare Packet" to be brought home for use as a resource after discharge.  Information included: signs and symptoms to look for and call the doctor if experiencing, and symptoms that may indicate a medical emergency: CALL 911.  All questions answered.  Teach back method used. Patient stated that he/she will remember the following signs and symptoms: black stools and dry mouth. CHEPE informed patient that [facility] will call her to schedule appointment and will refer her to GI. CHEPE spoke with nurse Escobar and informed her that patient was ready for discharge from  standpoint.        08/06/20 1137   Final Note   Assessment Type Final Discharge Note   Anticipated Discharge Disposition Home   What phone number can be called within the next 1-3 days to see how you are doing after discharge? [phone]   Hospital Follow Up  Appt(s) scheduled? Yes   Discharge plans and expectations educations in teach back method with documentation complete? Yes   Right Care Referral Info   Post Acute Recommendation No Care   Post-Acute Status   Post-Acute Authorization Other   Other Status No Post-Acute Service Needs   Discharge Delays None known at this time                                                 "

## 2020-08-06 NOTE — CONSULTS
[facility]  Psychiatry  Consult Note    Patient Name: Alicia Chu  MRN: [id number]   Code Status: Full Code  Admission Date: 8/3/2020  Hospital Length of Stay: 3 days  Attending Physician: Leonila Alonzo MD  Primary Care Provider: Primary Doctor No    Current Legal Status: N/A    Patient information was obtained from patient, chart review, nursing,  and ER records.   Inpatient consult to Psychiatry  Consult performed by: Arun Kay MD  Consult ordered by: Leonila Alonzo MD        Subjective:     Principal Problem:Acute upper GI bleeding    Chief Complaint:  Alcohol use, depression     HPI: Patient Alicia Chu presents to the hospital with nausea, vomiting, black stool and disclose that she recently relapsed on alcohol and would like some resources.  She has a history of heavy alcohol use for many years and went to inpatient rehab at [facility] last year.  She was 10 months sober when she return to drinking recently in April.  She admits to drinking wine and vodka daily to the point of feeling intoxicated.  She says that her  is also an alcoholic and lives in house with her.  She admits to purchasing the alcohol for both of them because he is disabled and not able to drive.  She was doing well with her Vivitrol injections monthly but stopped getting them earlier this year.  She admits to worsening depression because of her alcohol use and stress in the family setting.  Still has anxiety and is a constant worry wart.  Prior psychiatric hospitalization for her alcohol use and passive suicidal thoughts but no suicidal history.  Not sleeping well recently.  Denies any manic or psychotic history.  Says that she had an appointment to establish with outpatient psychiatric care today but missed it because of coming into the hospital.  She says that her Prozac 60 mg daily does not feel like it is working anymore and she would like to get restarted back on her Vivitrol  injection on a monthly basis.  She is set to be discharged today.  She feels that she does not need inpatient rehab and has enough resources and determination to remain off of alcohol.  She says that her alcohol withdrawals are usually shakes and tremors but has no seizure history.  She has no family support but does have friends and neighbors which help her out.    Hospital Course: No notes on file         Patient History           Medical as of 8/6/2020     Past Medical History     Diagnosis Date Comments Source    Addiction to drug -- -- Provider    Alcohol abuse -- -- Provider    Colon polyps -- -- Provider    Gastric bypass status for obesity 2006 -- Provider    History of psychiatric hospitalization -- -- Provider    Hx of psychiatric care -- -- Provider    Hypertension -- -- Provider    Postmenopausal HRT (hormone replacement therapy) -- -- Provider    Psychiatric problem -- -- Provider    Therapy -- -- Provider    Ulcer -- -- Provider          Pertinent Negatives     Diagnosis Date Noted Comments Source    Suicide attempt 08/06/2020 -- Provider                  Surgical as of 8/6/2020     Past Surgical History     Procedure Laterality Date Comments Source    CHOLECYSTECTOMY -- -- -- Provider    GASTRIC BYPASS -- -- zackary en Y Provider    HERNIA REPAIR -- x2 LI hernia Provider    KNEE ARTHROSCOPY W/ DEBRIDEMENT -- -- right Provider    INFECTED SKIN DEBRIDEMENT -- x3 -- Provider    BREAST BIOPSY -- 2005 right Provider    ABDOMINAL SURGERY -- -- -- Provider    ESOPHAGOGASTRODUODENOSCOPY N/A 4/18/2019 Procedure: EGD (ESOPHAGOGASTRODUODENOSCOPY);  Surgeon: Mony Plunkett MD;  Location: [facility];  Service: Endoscopy;  Laterality: N/A; Provider    HERNIA REPAIR -- -- -- Provider    ESOPHAGOGASTRODUODENOSCOPY N/A 8/4/2020 Procedure: EGD (ESOPHAGOGASTRODUODENOSCOPY);  Surgeon: Jose F Oreilly MD;  Location: [facility];  Service: Endoscopy;  Laterality: N/A; Provider                  Family as of 8/6/2020     Problem  Relation Name Age of Onset Comments Source    Cancer Mother 61 -- colon Provider    Dementia Father -- -- -- Provider    Hypertension Father -- -- -- Provider    Hyperlipidemia Father -- -- -- Provider    Alzheimer's disease Father -- 78 -- Provider    Cancer Paternal Grandfather -- -- colon cancer Provider    Breast cancer Maternal Uncle -- -- -- Provider            Tobacco Use as of 8/6/2020     Smoking Status Smoking Start Date Smoking Quit Date Packs/Day Years Used    Never Smoker -- -- -- --    Types Comments Smokeless Tobacco Status Smokeless Tobacco Quit Date Source    -- -- Never Used -- Provider            Alcohol Use as of 8/6/2020     Alcohol Use Drinks/Week Alcohol/Week Comments Source    Yes   0.0 standard drinks 1 small bottle wine daily (10oz) + 4 strong vodka cocktails daily Provider    Frequency Typical Drinks Binge Drinking        -- -- --              Drug Use as of 8/6/2020     Drug Use Types Frequency Comments Source    No -- -- -- Provider            Sexual Activity as of 8/6/2020     Sexually Active Birth Control Partners Comments Source    Yes None Male -- Provider            Activities of Daily Living as of 8/6/2020     Activities of Daily Living Question Response Comments Source    Patient feels they ought to cut down on drinking/drug use Not Asked -- Provider    Patient annoyed by others criticizing their drinking/drug use Not Asked -- Provider    Patient has felt bad or guilty about drinking/drug use Not Asked -- Provider    Patient has had a drink/used drugs as an eye opener in the AM Not Asked -- Provider            Social Documentation as of 8/6/2020    None           Occupational as of 8/6/2020    None           Socioeconomic as of 8/6/2020     Marital Status Spouse Name Number of Children Years Education Education Level Preferred Language Ethnicity Race Source     -- 0 -- -- English /White White Provider    Financial Resource Strain Food Insecurity: Worry Food  Insecurity: Inability Transportation Needs: Medical Transportation Needs: Non-medical    -- -- -- -- --            Pertinent History     Question Response Comments    Lives with spouse --    Place in Birth Order -- --    Lives in home --    Number of Siblings -- --    Raised by -- Grew up in [place]    Legal Involvement -- --    Childhood Trauma -- --    Criminal History of -- --    Financial Status unemployed --    Highest Level of Education -- --    Does patient have access to a firearm? -- --     Service No --    Primary Leisure Activity -- --    Spirituality -- --        Past Medical History:   Diagnosis Date    Addiction to drug     Alcohol abuse     Colon polyps     Gastric bypass status for obesity 2006    History of psychiatric hospitalization     Hx of psychiatric care     Hypertension     Postmenopausal HRT (hormone replacement therapy)     Psychiatric problem     Therapy     Ulcer      Past Surgical History:   Procedure Laterality Date    ABDOMINAL SURGERY      BREAST BIOPSY  2005    right    CHOLECYSTECTOMY      ESOPHAGOGASTRODUODENOSCOPY N/A 4/18/2019    Procedure: EGD (ESOPHAGOGASTRODUODENOSCOPY);  Surgeon: Mony Plunkett MD;  Location: [facility] ENDO;  Service: Endoscopy;  Laterality: N/A;    ESOPHAGOGASTRODUODENOSCOPY N/A 8/4/2020    Procedure: EGD (ESOPHAGOGASTRODUODENOSCOPY);  Surgeon: Jose F Oreilly MD;  Location: [facility] ENDO;  Service: Endoscopy;  Laterality: N/A;    GASTRIC BYPASS      zackary en Y    HERNIA REPAIR  x2    LI hernia    HERNIA REPAIR      INFECTED SKIN DEBRIDEMENT  x3    KNEE ARTHROSCOPY W/ DEBRIDEMENT      right     Family History     Problem Relation (Age of Onset)    Alzheimer's disease Father (78)    Breast cancer Maternal Uncle    Cancer Mother, Paternal Grandfather    Dementia Father    Hyperlipidemia Father    Hypertension Father        Tobacco Use    Smoking status: Never Smoker    Smokeless tobacco: Never Used   Substance and Sexual Activity    Alcohol  "use: Yes     Alcohol/week: 0.0 standard drinks     Comment: 1 small bottle wine daily (10oz) + 4 strong vodka cocktails daily    Drug use: No    Sexual activity: Yes     Partners: Male     Birth control/protection: None     Review of patient's allergies indicates:   Allergen Reactions    Aspirin      Due to gastric bypass    Ibuprofen      Other reaction(s): Due To Gastric Bypass  Due to gastric bypass    Inderal [propranolol]      "Felt bizarre"       No current facility-administered medications on file prior to encounter.      Current Outpatient Medications on File Prior to Encounter   Medication Sig    FLUoxetine 20 MG capsule Take 1 capsule (20 mg total) by mouth every evening. Also taking Fluoxetine 40 mg QAM    FLUoxetine 40 MG capsule Take 1 capsule (40 mg total) by mouth once daily. Also taking fluoxetine 20 mg QPM    hydrOXYzine pamoate (VISTARIL) 25 MG Cap     trazodone HCl (TRAZODONE ORAL)     [DISCONTINUED] pantoprazole (PROTONIX) 40 MG tablet Take 1 tablet PO BID x 2 weeks, then 1 tablet PO QD     Psychotherapeutics (From admission, onward)    Start     Stop Route Frequency Ordered    08/04/20 0900  FLUoxetine capsule 40 mg      -- Oral Daily 08/03/20 1608    08/03/20 1607  diazePAM tablet 5 mg      -- Oral Every 6 hours PRN 08/03/20 1607        Review of Systems   Constitutional: Positive for fatigue. Negative for activity change.   Respiratory: Negative for shortness of breath.    Cardiovascular: Negative for chest pain.   Gastrointestinal: Positive for nausea.   Musculoskeletal: Positive for myalgias.   Psychiatric/Behavioral: Positive for dysphoric mood. Negative for decreased concentration, hallucinations, sleep disturbance and suicidal ideas. The patient is not nervous/anxious.      Strengths and Liabilities: Strength: Patient is motivated for change., Liability: Patient has no suport network., Liability: Patient lacks coping skills.    Objective:     Vital Signs (Most Recent):  Temp: " "97.6 °F (36.4 °C) (08/06/20 1114)  Pulse: 77 (08/06/20 1114)  Resp: 18 (08/06/20 1114)  BP: (!) 158/97 (08/06/20 1114)  SpO2: 98 % (08/06/20 1114) Vital Signs (24h Range):  Temp:  [97.6 °F (36.4 °C)-98.5 °F (36.9 °C)] 97.6 °F (36.4 °C)  Pulse:  [69-87] 77  Resp:  [16-18] 18  SpO2:  [97 %-99 %] 98 %  BP: (119-160)/(70-99) 158/97     Height: 5' 5" (165.1 cm)  Weight: 72.1 kg (158 lb 15.2 oz)  Body mass index is 26.45 kg/m².      Intake/Output Summary (Last 24 hours) at 8/6/2020 1536  Last data filed at 8/5/2020 1700  Gross per 24 hour   Intake 100 ml   Output --   Net 100 ml       Physical Exam  Psychiatric:      Comments: EXAMINATION    CONSTITUTIONAL  General Appearance:  Hospital attire    MUSCULOSKELETAL  Muscle Strength and Tone:  Normal  Abnormal Involuntary Movements:  None noted  Gait and Station:  Not observed    PSYCHIATRIC MENTAL STATUS EXAM   Level of Consciousness:  Awake and alert  Orientation:  Name, place, date, situation  Grooming:  Fair  Psychomotor Behavior:  Normal  Speech:  Normal rate, tone, volume  Language:  No abnormality  Mood:  Good  Affect:  Slightly blunted  Thought Process:  Linear  Associations:  Intact  Thought Content:  Denies suicidal/homicidal/psychosis  Memory:  Intact to recent and remote  Attention:  Full  Fund of Knowledge:  Appropriate for conversation  Insight:  Fair into alcohol use and mental illness  Judgment:  Fair into cooperation with care and outpatient follow-up            Significant Labs:   Last 24 Hours:   Recent Lab Results     None        All pertinent labs within the past 24 hours have been reviewed.    Significant Imaging: I have reviewed all pertinent imaging results/findings within the past 24 hours.    Assessment/Plan:     Alcohol-induced mood disorder  Long term use of alcohol and comorbid anxiety and depression.  Depression worsened recently by her alcohol use.  Agree with continuing fluoxetine 60 mg daily and augment with bupropion  mg in the " morning only.  Can provide 1 month of outpatient medications into shooting and established elsewhere.  She can follow up with outpatient psychiatry and says that she is getting established at the [facility].  She incidentally missed an appointment today from being in the hospital.  No need for acute inpatient psychiatric hospitalization at this time.    Alcohol use disorder, severe, dependence  Long history of alcohol use with occasional setbacks.  Would do best with long-term inpatient rehab but feels that she has enough determination and resources to rehab on her own.  Start naltrexone 50 mg p.o. nightly to help with cravings.  She can transition to Vivitrol monthly injections IM as an outpatient.  Can provide 1 month of outpatient p.o. medications until she can get established with her provider.         Total Time:  60 minutes      Arun Kay MD   Psychiatry  [facility]

## 2020-08-06 NOTE — DISCHARGE SUMMARY
[facility] Hospital Medicine  Discharge Summary      Patient Name: Alicia Chu  MRN: [id number]  Admission Date: 8/3/2020  Hospital Length of Stay: 3 days  Discharge Date and Time:  08/06/2020 12:51 PM  Attending Physician: Leonila Alonzo MD   Discharging Provider: Leonila Mary MD  Primary Care Provider: Primary Doctor No      HPI:   Mrs. Chu is a 59 year old woman with history of peptic ulcer disease, gastric bypass in 2006 and alcoholism who presented for evaluation of abdominal pain, nausea and black stool.  She notes that the black stool was first noticed two days ago.  Over the last 24 hours the stool has become thick and tarry.  She has had six black tarry stools in the last 24 hours.  She notes this is associated with abdominal pain that started 4 days ago.  The pain is burning in sensation and located in her mid upper and lower abdomen.  It is non-radiating.  She reports associated cough and nausea for the last two days as well.  She does not take NSAIDs but is an alcoholic.  After 10 months of sobriety she relapsed in April of this year.  Currently she is drinking one small bottle of wine (10oz) and four strong vodka cocktails daily.  Her last drink was last night at 9PM.  She states she has not gone this long with alcohol since April and feels very anxious and tremulous.  She has had alcohol withdrawal before, but she states it has never been this bad.  She denies fevers, chills, vomiting, constipation, diarrhea, chest pain, numbness, tingling or focal weakness.    Procedure(s) (LRB):  EGD (ESOPHAGOGASTRODUODENOSCOPY) (N/A)      Hospital Course:   Ms Leary presented with melena in setting of history of Keyshawn-en-Y gastric bypass and alcohol consumption. Patient also presented with acute alcoholic hepatitis and mild alcohol withdrawals. Placed on pantoprazole 40 mg IV BID, low dose diazepam, changed to NPO status and gastroenterology consulted. Also placed on intravenous fluid.  Liver enzymes/TBil improved on a daily basis with supportive treatment. Underwent EGD. This showed. Non-bleeding clean based gastric ulcer with no stigmata of bleeding at G-J anastomosis. Gastric remnant biopsied. Results are pending. No evidence of active bleeding at this point. Will continue with pantoprazole 40 mg BID and upgrade diet as tolerated. Hyperbilirubinemia and elevation of ALT resolved. AST just 44 prior to discharge. Weaned off diazepam. Withdrawals resolved. Patient very motivated to quit drinking again and would like to see a Psychiatrist before she is discharged to discuss Vivitrol. Consult placed. Patient will need pantoprazole 40 mg BID for at least 2 months. GI added sucralfate which was also prescribed. Mag and potassium supplementation for at least a week. MVI, thiamine and folic acid. Activity as tolerated. F/u with GI.      Addendum: per Psychiatry, patient is to start fluoxetine 60 mg daily and bupropion 100 mg daily for alcohol-induced mood disorder, and naltrexone 50 mg QHS for alcohol use disorder. Recommends prescription for one month until she is able to follow up with Psychiatry    Consults:   Consults (From admission, onward)        Status Ordering Provider     Inpatient consult to Gastroenterology  Once     Provider:  Thony Lange MD    Completed CHRISTINA JAEGER     Inpatient consult to Gastroenterology  Once     Provider:  Thony Lange MD    Completed SARAH WOODWARD     Inpatient consult to Psychiatry  Once     Provider:  Arun Kay MD    Ordered BISHOP GARZA        Final Active Diagnoses:    Diagnosis Date Noted POA    PRINCIPAL PROBLEM:  Acute upper GI bleeding [K92.2] 04/17/2019 Yes    Ulcer at site of surgical anastomosis following bypass of stomach [K28.9] 08/03/2020 Yes    Acute alcoholic hepatitis [K70.10] 08/03/2020 Yes    Alcohol withdrawal syndrome without complication [F10.230] 08/04/2020 Yes    Alcohol use disorder, severe,  dependence [F10.20] 08/03/2020 Yes    Hyperbilirubinemia [E80.6] 08/03/2020 Yes    History of Keyshawn-en-Y gastric bypass [Z98.84] 09/26/2013 Not Applicable    Essential hypertension [I10]  Yes      Problems Resolved During this Admission:       Discharged Condition: good    Disposition: Home or Self Care    Follow Up:  Follow-up Information     Thony Lange MD.    Specialty: Gastroenterology  Why: [facility] does not accept patient's insurance.  Will need referral with [facility] for GI when seen  Contact information:  [address] [place] [address]  [address]  [facility]  [place] [address]  [phone]             [facility] [place] In 1 week.    Why: out patient services:  Emeli says new patient  will call patient to set up an appointment  Contact information:  [address] [facility]  [place] [address]  [phone]                 Pending Diagnostic Studies:     Procedure Component Value Units Date/Time    Specimen to Pathology, Surgery Gastrointestinal tract [[id number]] Collected: 08/04/20 1214    Order Status: Sent Lab Status: In process Updated: 08/05/20 0835         Medications:   Alicia Chu   Home Medication Instructions SHWETA:[id number]    Printed on:08/06/20 1617   Medication Information                      buPROPion (WELLBUTRIN) 100 MG tablet  Take 1 tablet (100 mg total) by mouth once daily.             FLUoxetine 20 MG capsule  Take 3 capsules (60 mg total) by mouth once daily. Also taking Fluoxetine 40 mg QAM             folic acid (FOLVITE) 1 MG tablet  Take 1 tablet (1 mg total) by mouth once daily.             hydrOXYzine pamoate (VISTARIL) 25 MG Cap               magnesium oxide (MAG-OX) 400 mg (241.3 mg magnesium) tablet  Take 1 tablet (400 mg total) by mouth 2 (two) times daily. for 7 days             multivitamin Tab  Take 1 tablet by mouth once daily.             naltrexone (DEPADE) 50 mg tablet  Take 1 tablet (50 mg total) by mouth once  daily.             pantoprazole (PROTONIX) 40 MG tablet  Take 1 tablet (40 mg total) by mouth 2 (two) times daily.             potassium chloride SA (K-DUR,KLOR-CON) 20 MEQ tablet  Take 1 tablet (20 mEq total) by mouth once daily. for 7 days             sucralfate (CARAFATE) 100 mg/mL suspension  Take 10 mLs (1 g total) by mouth 4 (four) times daily before meals and nightly.             thiamine 100 MG tablet  Take 1 tablet (100 mg total) by mouth once daily.             trazodone HCl (TRAZODONE ORAL)                 Time spent on the discharge of patient: > 35 minutes  Patient was seen and examined on the date of discharge and determined to be suitable for discharge.        Leonila Mary MD  Department of Hospital Medicine  [facility]

## 2020-08-06 NOTE — ASSESSMENT & PLAN NOTE
Long term use of alcohol and comorbid anxiety and depression.  Depression worsened recently by her alcohol use.  Agree with continuing fluoxetine 60 mg daily and augment with bupropion  mg in the morning only.  Can provide 1 month of outpatient medications into shooting and established elsewhere.  She can follow up with outpatient psychiatry and says that she is getting established at the [facility].  She incidentally missed an appointment today from being in the hospital.  No need for acute inpatient psychiatric hospitalization at this time.

## 2020-08-06 NOTE — ASSESSMENT & PLAN NOTE
Long history of alcohol use with occasional setbacks.  Would do best with long-term inpatient rehab but feels that she has enough determination and resources to rehab on her own.  Start naltrexone 50 mg p.o. nightly to help with cravings.  She can transition to Vivitrol monthly injections IM as an outpatient.  Can provide 1 month of outpatient p.o. medications until she can get established with her provider.

## 2020-08-06 NOTE — PROGRESS NOTES
[facility] CASE MANAGEMENT                  WRITTEN DISCHARGE INFORMATION      APPOINTMENTS AND RESOURCES TO HELP YOU MANAGE YOUR CARE AT HOME BASED ON YOUR PREFERENCES:  Follow-up Information     Thony Lange MD.    Specialty: Gastroenterology  Why: [facility] does not accept patient's insurance.  Will need referral with [facility] for GI when seen  Contact information:  [address] [place] [address]  [address]  [facility]  [place] [address]  [phone]             [facility] [place] In 1 week.    Why: out patient services:  Emeli says new patient  will call patient to set up an appointment  Contact information:  [address]  [place] [address]  [phone]                 Healthy Living Instructions to HELP MANAGE YOUR CARE AT HOME:  Things You are responsible for:  1.    Getting your prescriptions filled   2.    Taking your medications as directed, DO NOT MISS ANY DOSES!  3.    Following the diet and exercise recommended by your doctor  4.    Going to your follow-up doctor appointment. This is important because it allows the doctor to monitor your progress and determine if any changes need to made to your treatment plan.  5. If you have any questions about MANAGING YOUR CARE AT HOME Call the Nurse Care Line for 24/7 Assistance [phone]       Please answer any calls you may receive from [facility]. We want to continue to support you as you manage your healthcare needs. [facility] is happy to have the opportunity to serve you.      Thank you for choosing [facility] for your healthcare needs!  Your [facility] Case Management Team,    Juana Salcido   II  Care Management  [phone]

## 2020-08-06 NOTE — NURSING
Pt discharge teaching was implemented along with paperwork. D/c IV sites, catheter intact. Pt AAOx4 VS were stable. Pt verbalizes understanding and no questions were asked.

## 2020-08-06 NOTE — HPI
Patient Alicia Chu presents to the hospital with nausea, vomiting, black stool and disclose that she recently relapsed on alcohol and would like some resources.  She has a history of heavy alcohol use for many years and went to inpatient rehab at [facility] last year.  She was 10 months sober when she return to drinking recently in April.  She admits to drinking wine and vodka daily to the point of feeling intoxicated.  She says that her  is also an alcoholic and lives in house with her.  She admits to purchasing the alcohol for both of them because he is disabled and not able to drive.  She was doing well with her Vivitrol injections monthly but stopped getting them earlier this year.  She admits to worsening depression because of her alcohol use and stress in the family setting.  Still has anxiety and is a constant worry wart.  Prior psychiatric hospitalization for her alcohol use and passive suicidal thoughts but no suicidal history.  Not sleeping well recently.  Denies any manic or psychotic history.  Says that she had an appointment to establish with outpatient psychiatric care today but missed it because of coming into the hospital.  She says that her Prozac 60 mg daily does not feel like it is working anymore and she would like to get restarted back on her Vivitrol injection on a monthly basis.  She is set to be discharged today.  She feels that she does not need inpatient rehab and has enough resources and determination to remain off of alcohol.  She says that her alcohol withdrawals are usually shakes and tremors but has no seizure history.  She has no family support but does have friends and neighbors which help her out.

## 2020-08-07 ENCOUNTER — PATIENT OUTREACH (OUTPATIENT)
Dept: ADMINISTRATIVE | Facility: CLINIC | Age: 60
End: 2020-08-07

## 2020-08-07 NOTE — PROGRESS NOTES
C3 nurse attempted to contact patient. No answer. The following message was left for the patient to return the call:  Good afternoon, I am a nurse calling on behalf of [facility] from the [facility].  This is a Transitional Care Call for Alicia Chu. When you have a moment please contact us at [phone] or [phone] Monday through Friday, between the hours of 8 am to 4 pm. We look forward to speaking with you. On behalf of [facility] have a nice day.    The patient does not have a scheduled HOSFU appointment within 7 days post hospital discharge date 8/6.

## 2020-09-03 ENCOUNTER — HOSPITAL ENCOUNTER (OUTPATIENT)
Facility: HOSPITAL | Age: 60
LOS: 1 days | Discharge: HOME | End: 2020-09-04
Attending: STUDENT IN AN ORGANIZED HEALTH CARE EDUCATION/TRAINING PROGRAM | Admitting: STUDENT IN AN ORGANIZED HEALTH CARE EDUCATION/TRAINING PROGRAM
Payer: MEDICAID

## 2020-09-03 DIAGNOSIS — D50.0 ANEMIA DUE TO CHRONIC BLOOD LOSS: ICD-10-CM

## 2020-09-03 DIAGNOSIS — R06.02 SHORTNESS OF BREATH: ICD-10-CM

## 2020-09-03 DIAGNOSIS — I10 ESSENTIAL HYPERTENSION: ICD-10-CM

## 2020-09-03 DIAGNOSIS — E83.39 HYPOPHOSPHATEMIA: ICD-10-CM

## 2020-09-03 DIAGNOSIS — Z98.84 GASTRIC BYPASS STATUS FOR OBESITY: Primary | ICD-10-CM

## 2020-09-03 DIAGNOSIS — K92.2 CHRONIC GI BLEEDING: ICD-10-CM

## 2020-09-03 DIAGNOSIS — R53.1 WEAKNESS: ICD-10-CM

## 2020-09-03 DIAGNOSIS — F10.94 ALCOHOL-INDUCED MOOD DISORDER: ICD-10-CM

## 2020-09-03 PROBLEM — K25.7 CHRONIC GASTRIC ULCER: Status: ACTIVE | Noted: 2020-09-03

## 2020-09-03 LAB
ABO + RH BLD: NORMAL
ALBUMIN SERPL BCP-MCNC: 3 G/DL (ref 3.5–5.2)
ALP SERPL-CCNC: 260 U/L (ref 55–135)
ALT SERPL W/O P-5'-P-CCNC: 35 U/L (ref 10–44)
ANION GAP SERPL CALC-SCNC: 17 MMOL/L (ref 8–16)
AST SERPL-CCNC: 66 U/L (ref 10–40)
BASOPHILS # BLD AUTO: 0.01 K/UL (ref 0–0.2)
BASOPHILS NFR BLD: 0.2 % (ref 0–1.9)
BILIRUB SERPL-MCNC: 1.7 MG/DL (ref 0.1–1)
BLD GP AB SCN CELLS X3 SERPL QL: NORMAL
BNP SERPL-MCNC: 65 PG/ML (ref 0–99)
BUN SERPL-MCNC: 17 MG/DL (ref 6–20)
CALCIUM SERPL-MCNC: 9.6 MG/DL (ref 8.7–10.5)
CHLORIDE SERPL-SCNC: 89 MMOL/L (ref 95–110)
CO2 SERPL-SCNC: 24 MMOL/L (ref 23–29)
CREAT SERPL-MCNC: 0.9 MG/DL (ref 0.5–1.4)
DIFFERENTIAL METHOD: ABNORMAL
EOSINOPHIL # BLD AUTO: 0 K/UL (ref 0–0.5)
EOSINOPHIL NFR BLD: 0.6 % (ref 0–8)
ERYTHROCYTE [DISTWIDTH] IN BLOOD BY AUTOMATED COUNT: 16.7 % (ref 11.5–14.5)
EST. GFR  (AFRICAN AMERICAN): >60 ML/MIN/1.73 M^2
EST. GFR  (NON AFRICAN AMERICAN): >60 ML/MIN/1.73 M^2
GLUCOSE SERPL-MCNC: 171 MG/DL (ref 70–110)
HCT VFR BLD AUTO: 35.5 % (ref 37–48.5)
HGB BLD-MCNC: 11.4 G/DL (ref 12–16)
IMM GRANULOCYTES # BLD AUTO: 0.08 K/UL (ref 0–0.04)
IMM GRANULOCYTES NFR BLD AUTO: 1.5 % (ref 0–0.5)
LIPASE SERPL-CCNC: 88 U/L (ref 4–60)
LYMPHOCYTES # BLD AUTO: 0.7 K/UL (ref 1–4.8)
LYMPHOCYTES NFR BLD: 12.8 % (ref 18–48)
MAGNESIUM SERPL-MCNC: 1.9 MG/DL (ref 1.6–2.6)
MCH RBC QN AUTO: 28.6 PG (ref 27–31)
MCHC RBC AUTO-ENTMCNC: 32.1 G/DL (ref 32–36)
MCV RBC AUTO: 89 FL (ref 82–98)
MONOCYTES # BLD AUTO: 0.7 K/UL (ref 0.3–1)
MONOCYTES NFR BLD: 12.6 % (ref 4–15)
NEUTROPHILS # BLD AUTO: 3.9 K/UL (ref 1.8–7.7)
NEUTROPHILS NFR BLD: 72.3 % (ref 38–73)
NRBC BLD-RTO: 2 /100 WBC
PHOSPHATE SERPL-MCNC: 1 MG/DL (ref 2.7–4.5)
PLATELET # BLD AUTO: 75 K/UL (ref 150–350)
PMV BLD AUTO: 12.2 FL (ref 9.2–12.9)
POTASSIUM SERPL-SCNC: 3.2 MMOL/L (ref 3.5–5.1)
PROT SERPL-MCNC: 6.4 G/DL (ref 6–8.4)
RBC # BLD AUTO: 3.99 M/UL (ref 4–5.4)
SARS-COV-2 RDRP RESP QL NAA+PROBE: NEGATIVE
SODIUM SERPL-SCNC: 130 MMOL/L (ref 136–145)
TROPONIN I SERPL DL<=0.01 NG/ML-MCNC: 0.02 NG/ML (ref 0–0.03)
WBC # BLD AUTO: 5.33 K/UL (ref 3.9–12.7)

## 2020-09-03 PROCEDURE — 84100 ASSAY OF PHOSPHORUS: CPT

## 2020-09-03 PROCEDURE — 96376 TX/PRO/DX INJ SAME DRUG ADON: CPT

## 2020-09-03 PROCEDURE — 94761 N-INVAS EAR/PLS OXIMETRY MLT: CPT

## 2020-09-03 PROCEDURE — 93005 ELECTROCARDIOGRAM TRACING: CPT

## 2020-09-03 PROCEDURE — 25000003 PHARM REV CODE 250: Performed by: STUDENT IN AN ORGANIZED HEALTH CARE EDUCATION/TRAINING PROGRAM

## 2020-09-03 PROCEDURE — G0378 HOSPITAL OBSERVATION PER HR: HCPCS

## 2020-09-03 PROCEDURE — 25000003 PHARM REV CODE 250: Performed by: PHYSICIAN ASSISTANT

## 2020-09-03 PROCEDURE — 84484 ASSAY OF TROPONIN QUANT: CPT

## 2020-09-03 PROCEDURE — 99291 CRITICAL CARE FIRST HOUR: CPT | Mod: 25

## 2020-09-03 PROCEDURE — 83880 ASSAY OF NATRIURETIC PEPTIDE: CPT

## 2020-09-03 PROCEDURE — 96375 TX/PRO/DX INJ NEW DRUG ADDON: CPT

## 2020-09-03 PROCEDURE — 93010 EKG 12-LEAD: ICD-10-PCS | Mod: ,,, | Performed by: INTERNAL MEDICINE

## 2020-09-03 PROCEDURE — 99292 CRITICAL CARE ADDL 30 MIN: CPT

## 2020-09-03 PROCEDURE — 63600175 PHARM REV CODE 636 W HCPCS: Performed by: HOSPITALIST

## 2020-09-03 PROCEDURE — 85025 COMPLETE CBC W/AUTO DIFF WBC: CPT

## 2020-09-03 PROCEDURE — 25000003 PHARM REV CODE 250: Performed by: HOSPITALIST

## 2020-09-03 PROCEDURE — 96365 THER/PROPH/DIAG IV INF INIT: CPT

## 2020-09-03 PROCEDURE — 83690 ASSAY OF LIPASE: CPT

## 2020-09-03 PROCEDURE — 96361 HYDRATE IV INFUSION ADD-ON: CPT

## 2020-09-03 PROCEDURE — 80053 COMPREHEN METABOLIC PANEL: CPT

## 2020-09-03 PROCEDURE — C9113 INJ PANTOPRAZOLE SODIUM, VIA: HCPCS | Performed by: HOSPITALIST

## 2020-09-03 PROCEDURE — 96366 THER/PROPH/DIAG IV INF ADDON: CPT

## 2020-09-03 PROCEDURE — 63600175 PHARM REV CODE 636 W HCPCS: Performed by: STUDENT IN AN ORGANIZED HEALTH CARE EDUCATION/TRAINING PROGRAM

## 2020-09-03 PROCEDURE — 93010 ELECTROCARDIOGRAM REPORT: CPT | Mod: ,,, | Performed by: INTERNAL MEDICINE

## 2020-09-03 PROCEDURE — 96367 TX/PROPH/DG ADDL SEQ IV INF: CPT

## 2020-09-03 PROCEDURE — 86850 RBC ANTIBODY SCREEN: CPT

## 2020-09-03 PROCEDURE — 83735 ASSAY OF MAGNESIUM: CPT

## 2020-09-03 PROCEDURE — U0002 COVID-19 LAB TEST NON-CDC: HCPCS

## 2020-09-03 RX ORDER — SUCRALFATE 1 G/10ML
1 SUSPENSION ORAL EVERY 6 HOURS
Status: DISCONTINUED | OUTPATIENT
Start: 2020-09-03 | End: 2020-09-04 | Stop reason: HOSPADM

## 2020-09-03 RX ORDER — DIAZEPAM 5 MG/1
5 TABLET ORAL EVERY 8 HOURS
Status: DISCONTINUED | OUTPATIENT
Start: 2020-09-03 | End: 2020-09-03

## 2020-09-03 RX ORDER — TRAZODONE HYDROCHLORIDE 50 MG/1
150 TABLET ORAL NIGHTLY
Status: DISCONTINUED | OUTPATIENT
Start: 2020-09-03 | End: 2020-09-04 | Stop reason: HOSPADM

## 2020-09-03 RX ORDER — SODIUM CHLORIDE 9 MG/ML
INJECTION, SOLUTION INTRAVENOUS CONTINUOUS
Status: DISCONTINUED | OUTPATIENT
Start: 2020-09-03 | End: 2020-09-03

## 2020-09-03 RX ORDER — LANOLIN ALCOHOL/MO/W.PET/CERES
100 CREAM (GRAM) TOPICAL DAILY
Status: DISCONTINUED | OUTPATIENT
Start: 2020-09-04 | End: 2020-09-04 | Stop reason: HOSPADM

## 2020-09-03 RX ORDER — CLONIDINE HYDROCHLORIDE 0.1 MG/1
0.1 TABLET ORAL 3 TIMES DAILY
Status: DISCONTINUED | OUTPATIENT
Start: 2020-09-03 | End: 2020-09-04 | Stop reason: HOSPADM

## 2020-09-03 RX ORDER — FOLIC ACID 1 MG/1
1 TABLET ORAL DAILY
Status: DISCONTINUED | OUTPATIENT
Start: 2020-09-04 | End: 2020-09-04 | Stop reason: HOSPADM

## 2020-09-03 RX ORDER — ACETAMINOPHEN 500 MG
500 TABLET ORAL EVERY 6 HOURS PRN
Status: DISCONTINUED | OUTPATIENT
Start: 2020-09-03 | End: 2020-09-04 | Stop reason: HOSPADM

## 2020-09-03 RX ORDER — CLONIDINE HYDROCHLORIDE 0.1 MG/1
0.1 TABLET ORAL 3 TIMES DAILY
Status: DISCONTINUED | OUTPATIENT
Start: 2020-09-03 | End: 2020-09-03

## 2020-09-03 RX ORDER — SODIUM CHLORIDE 9 MG/ML
INJECTION, SOLUTION INTRAVENOUS CONTINUOUS
Status: DISCONTINUED | OUTPATIENT
Start: 2020-09-03 | End: 2020-09-04

## 2020-09-03 RX ORDER — BUPROPION HYDROCHLORIDE 100 MG/1
100 TABLET ORAL DAILY
Status: DISCONTINUED | OUTPATIENT
Start: 2020-09-04 | End: 2020-09-04 | Stop reason: HOSPADM

## 2020-09-03 RX ORDER — DIAZEPAM 5 MG/1
5 TABLET ORAL EVERY 8 HOURS
Status: DISCONTINUED | OUTPATIENT
Start: 2020-09-03 | End: 2020-09-04 | Stop reason: HOSPADM

## 2020-09-03 RX ORDER — CLONIDINE HYDROCHLORIDE 0.1 MG/1
0.1 TABLET ORAL EVERY 4 HOURS PRN
Status: DISCONTINUED | OUTPATIENT
Start: 2020-09-03 | End: 2020-09-03

## 2020-09-03 RX ORDER — FLUOXETINE 20 MG/5ML
20 SOLUTION ORAL DAILY
Status: DISCONTINUED | OUTPATIENT
Start: 2020-09-04 | End: 2020-09-04 | Stop reason: HOSPADM

## 2020-09-03 RX ADMIN — ACETAMINOPHEN 500 MG: 500 TABLET ORAL at 09:09

## 2020-09-03 RX ADMIN — DIAZEPAM 5 MG: 5 TABLET ORAL at 09:09

## 2020-09-03 RX ADMIN — CEFTRIAXONE 1 G: 1 INJECTION, SOLUTION INTRAVENOUS at 10:09

## 2020-09-03 RX ADMIN — SUCRALFATE 1 G: 1 SUSPENSION ORAL at 11:09

## 2020-09-03 RX ADMIN — POTASSIUM PHOSPHATE, MONOBASIC POTASSIUM PHOSPHATE, DIBASIC 30 MMOL: 224; 236 INJECTION, SOLUTION, CONCENTRATE INTRAVENOUS at 04:09

## 2020-09-03 RX ADMIN — POTASSIUM PHOSPHATE, MONOBASIC 1000 MG: 500 TABLET, SOLUBLE ORAL at 09:09

## 2020-09-03 RX ADMIN — SODIUM CHLORIDE, SODIUM LACTATE, POTASSIUM CHLORIDE, AND CALCIUM CHLORIDE 1000 ML: .6; .31; .03; .02 INJECTION, SOLUTION INTRAVENOUS at 01:09

## 2020-09-03 RX ADMIN — TRAZODONE HYDROCHLORIDE 150 MG: 50 TABLET ORAL at 09:09

## 2020-09-03 RX ADMIN — SODIUM CHLORIDE: 0.9 INJECTION, SOLUTION INTRAVENOUS at 09:09

## 2020-09-03 RX ADMIN — CLONIDINE HYDROCHLORIDE 0.1 MG: 0.1 TABLET ORAL at 09:09

## 2020-09-03 RX ADMIN — DEXTROSE 8 MG/HR: 50 INJECTION, SOLUTION INTRAVENOUS at 09:09

## 2020-09-03 NOTE — ED PROVIDER NOTES
"Encounter Date: 9/3/2020       History     Chief Complaint   Patient presents with    GI Bleeding     pt comes in via EMS from home with c/o bright red blood in stool x2 days. She states hx of GI bleed for which she was hospitalized here 3 weeks ago for. She states she has had diarrhea with nausea but denies vomiting. hx of gastric bypass 14 years ago.      HPI     59-year-old female with past medical history of gastric bypass, HTN, gastric ulcers, anemia and alcohol use presents by EMS from home with a 2 days history of rectal bleeding. Patient states she reports 3 episodes today and also endorses associated SOB, nausea, diarrhea, lower quadrant abdominal pain, weakness since Tuesday and LE swelling that she noticed today. She reports she lives at home with her  and prior to Tuesday, she was able to care for herself, performing all her daily ADLs. However, since Tuesday her weakness has gradually worsen and she has been incontinent of stool due to inability to get out of the bed. EMS reports that the patient was covered in feces on their arrival and her home appeared unkept. Patient was admitted at [facility] on 8/3/2020 for rectal bleeding with alcohol intoxication and found to have non-bleeding clean based gastric ulcer with no stigmata of bleeding at G-J anastomosis on EGD. Patient states she has since quit drinking and denies alcohol use at this time.     Review of patient's allergies indicates:   Allergen Reactions    Aspirin      Due to gastric bypass    Ibuprofen      Other reaction(s): Due To Gastric Bypass  Due to gastric bypass    Inderal [propranolol]      "Felt bizarre"     Past Medical History:   Diagnosis Date    Addiction to drug     Alcohol abuse     Colon polyps     Gastric bypass status for obesity 2006    GIB (gastrointestinal bleeding)     History of psychiatric hospitalization     Hx of psychiatric care     Hypertension     Postmenopausal HRT (hormone replacement therapy)     " Psychiatric problem     Therapy     Ulcer      Past Surgical History:   Procedure Laterality Date    ABDOMINAL SURGERY      BREAST BIOPSY  2005    right    CHOLECYSTECTOMY      ESOPHAGOGASTRODUODENOSCOPY N/A 4/18/2019    Procedure: EGD (ESOPHAGOGASTRODUODENOSCOPY);  Surgeon: Mony Plunkett MD;  Location: [facility] ENDO;  Service: Endoscopy;  Laterality: N/A;    ESOPHAGOGASTRODUODENOSCOPY N/A 8/4/2020    Procedure: EGD (ESOPHAGOGASTRODUODENOSCOPY);  Surgeon: Jose F Oreilly MD;  Location: [facility] ENDO;  Service: Endoscopy;  Laterality: N/A;    GASTRIC BYPASS  2006    zackary en Y    HERNIA REPAIR  x2    LI hernia    HERNIA REPAIR      INFECTED SKIN DEBRIDEMENT  x3    KNEE ARTHROSCOPY W/ DEBRIDEMENT      right     Family History   Problem Relation Age of Onset    Cancer Mother         colon    Dementia Father     Hypertension Father     Hyperlipidemia Father     Alzheimer's disease Father 78    Cancer Paternal Grandfather         colon cancer    Breast cancer Maternal Uncle      Social History     Tobacco Use    Smoking status: Never Smoker    Smokeless tobacco: Never Used   Substance Use Topics    Alcohol use: Yes     Alcohol/week: 0.0 standard drinks     Comment: 1 small bottle wine daily (10oz) + 4 strong vodka cocktails daily    Drug use: No     Review of Systems   Constitutional: Positive for appetite change. Negative for chills and fever.   HENT: Positive for congestion and rhinorrhea. Negative for drooling and facial swelling.    Eyes: Negative for redness and visual disturbance.   Respiratory: Positive for shortness of breath. Negative for cough.    Cardiovascular: Positive for leg swelling. Negative for chest pain.   Gastrointestinal: Positive for abdominal pain, blood in stool, diarrhea and nausea. Negative for vomiting.   Genitourinary: Negative for dysuria and hematuria.   Musculoskeletal: Negative for back pain and neck pain.   Skin: Negative for rash and wound.   Neurological: Positive for  weakness. Negative for dizziness, tremors, seizures, syncope, facial asymmetry, numbness and headaches.   Psychiatric/Behavioral: Negative for confusion.       Physical Exam     Initial Vitals [09/03/20 1232]   BP Pulse Resp Temp SpO2   (!) 131/92 99 18 98.3 °F (36.8 °C) 99 %      MAP       --         Physical Exam    Nursing note and vitals reviewed.  Constitutional: She is not diaphoretic.  Non-toxic appearance. She has a sickly appearance.   Unkept and covered in old feces   HENT:   Head: Normocephalic and atraumatic.   Right Ear: External ear normal.   Left Ear: External ear normal.   Mouth/Throat: Mucous membranes are not pale and dry.   Eyes: EOM are normal. Pupils are equal, round, and reactive to light. No scleral icterus.   Neck: Normal range of motion. Neck supple. No JVD present.   Cardiovascular: Normal rate and regular rhythm. Exam reveals no gallop and no friction rub.    No murmur heard.  Pulmonary/Chest:   Coarse breath sounds diffusely    Abdominal: Soft. She exhibits no distension and no mass. There is generalized abdominal tenderness. There is no rigidity, no rebound, no guarding and no CVA tenderness.   Genitourinary:    Rectum normal.   Rectum:      Guaiac result positive.      No rectal mass, tenderness, external hemorrhoid or internal hemorrhoid.   Guaiac positive stool. : Acceptable.  Musculoskeletal: Normal range of motion. Edema (2+ non-pitting, symmetric bilaterally) present. No tenderness.   Neurological: She is alert. She displays no atrophy and no tremor. No cranial nerve deficit or sensory deficit. She exhibits normal muscle tone. She displays no seizure activity.   Skin: Skin is warm and dry. No rash noted.   Psychiatric: She has a normal mood and affect.         ED Course   Critical Care    Date/Time: 9/3/2020 4:48 PM  Performed by: Jing Brooks DO  Authorized by: Jing Brooks DO   Direct patient critical care time: 20 minutes  Additional  history critical care time: 10 minutes  Ordering / reviewing critical care time: 15 minutes  Documentation critical care time: 20 minutes  Consulting other physicians critical care time: 10 minutes  Consult with family critical care time: 10 minutes  Total critical care time (exclusive of procedural time) : 85 minutes  Critical care time was exclusive of separately billable procedures and treating other patients.  Critical care was necessary to treat or prevent imminent or life-threatening deterioration of the following conditions: circulatory failure.  Critical care was time spent personally by me on the following activities: development of treatment plan with patient or surrogate, discussions with consultants, evaluation of patient's response to treatment, examination of patient, ordering and performing treatments and interventions, ordering and review of laboratory studies, ordering and review of radiographic studies, re-evaluation of patient's condition and review of old charts.        Labs Reviewed   CBC W/ AUTO DIFFERENTIAL - Abnormal; Notable for the following components:       Result Value    RBC 3.99 (*)     Hemoglobin 11.4 (*)     Hematocrit 35.5 (*)     RDW 16.7 (*)     Platelets 75 (*)     Immature Granulocytes 1.5 (*)     Immature Grans (Abs) 0.08 (*)     Lymph # 0.7 (*)     nRBC 2 (*)     Lymph% 12.8 (*)     All other components within normal limits   COMPREHENSIVE METABOLIC PANEL - Abnormal; Notable for the following components:    Sodium 130 (*)     Potassium 3.2 (*)     Chloride 89 (*)     Glucose 171 (*)     Albumin 3.0 (*)     Total Bilirubin 1.7 (*)     Alkaline Phosphatase 260 (*)     AST 66 (*)     Anion Gap 17 (*)     All other components within normal limits   LIPASE - Abnormal; Notable for the following components:    Lipase 88 (*)     All other components within normal limits   PHOSPHORUS - Abnormal; Notable for the following components:    Phosphorus 1.0 (*)     All other components within  normal limits    Narrative:        phosphorus critical result(s) called and verbal readback obtained   from RN BEN MARQUEZBEBETOTONYA  by TN3 09/03/2020 14:00   MAGNESIUM   SARS-COV-2 RNA AMPLIFICATION, QUAL   TROPONIN I   B-TYPE NATRIURETIC PEPTIDE   TYPE & SCREEN     EKG Readings: (Independently Interpreted)   Initial Reading: No STEMI. Previous EKG: Compared with most recent EKG Previous EKG Date: 8/3/2020. Rhythm: Normal Sinus Rhythm. Heart Rate: 96 . Ectopy: PVCs. ST Segments: Normal ST Segments. Axis: Normal. Other Findings: Prolonged QT Interval.     ECG Results          EKG 12-lead (Final result)  Result time 09/04/20 20:50:00    Final result by Interface, Lab In [facility] (09/04/20 20:50:00)                 Narrative:    Test Reason : R06.02,    Vent. Rate : 096 BPM     Atrial Rate : 096 BPM     P-R Int : 138 ms          QRS Dur : 084 ms      QT Int : 438 ms       P-R-T Axes : 068 069 054 degrees     QTc Int : 553 ms    Sinus rhythm with occasional Premature ventricular complexes  Right atrial enlargement  Prolonged QT  Abnormal ECG  When compared with ECG of 03-AUG-2020 12:08,  Significant changes have occurred  Confirmed by Boaz ANDRADE, Trav OSEGUERA ([id number]) on 9/4/2020 8:49:50 PM    Referred By: [email or web address]   SELF           Confirmed By:Trav Sandra MD                            Imaging Results          X-Ray Abdomen AP 1 View (Final result)  Result time 09/03/20 18:07:57    Final result by Klaus Pinzon MD (09/03/20 18:07:57)                 Impression:      1. Scattered upper limit of normal caliber air-filled small bowel loops.  Finding is nonspecific, possibly on the basis of constipation or other generalized slow flow.  Correlation is recommended.  Further evaluation as warranted.      Electronically signed by: Klaus Pinzon MD  Date:    09/03/2020  Time:    18:07             Narrative:    EXAMINATION:  XR ABDOMEN AP 1 VIEW    CLINICAL HISTORY:  pain;    TECHNIQUE:  AP View(s) of the abdomen was  performed.    COMPARISON:  04/18/2019    FINDINGS:  Two views abdomen supine.    Air and stool is seen within the large bowel and projected over the rectum.  There is moderate stool throughout the colon.  There are a few scattered mildly prominent small bowel loops, gas-filled.  There are no calcifications to convincingly suggest nephrolithiasis or cholelithiasis.  No findings to suggest pneumatosis.  The osseous structures are intact.  Surgical change overlies the right upper quadrant.                               X-Ray Chest AP Portable (Final result)  Result time 09/03/20 13:22:50    Final result by Marleny Cook MD (09/03/20 13:22:50)                 Impression:      No source for the patient's symptoms identified.      Electronically signed by: Marleny Cook MD  Date:    09/03/2020  Time:    13:22             Narrative:    EXAMINATION:  XR CHEST AP PORTABLE    CLINICAL HISTORY:  shortness of breath;    TECHNIQUE:  Single frontal view of the chest was performed.    COMPARISON:  07/13/2020.  07/05/2020.    FINDINGS:  Mediastinal structures are midline. Cardiac silhouette and pulmonary vascular distribution are normal.    Lung volumes are normal and symmetric. I detect no pulmonary disease, pleural fluid, lymph node enlargement, cardiac decompensation, pneumothorax, pneumomediastinum, pneumoperitoneum or significant osseous abnormality.  I suspect old injury at the lateral aspect of the right clavicle.                                                              MDM  This is an emergent evaluation of a 59-year-old female with past medical history of gastric bypass, HTN, gastric ulcers, anemia and alcohol use presents by EMS from home with a 2 days history of rectal bleeding and associated SOB, nausea, diarrhea, lower quadrant abdominal pain, weakness since and LE swelling. Initial vitals unremarkable. Physical exam notable for sickly appearing, unkept female who was covered in feces on ED arrival. Lungs  with coarse breath sounds diffusely. Abdomen soft, tender to palpation diffusely without obvious peritoneal signs, non-distended. Hemoccult positive on SHARYN without gross blood masses or lesions. MACKAY, following all commands, no focal neurologic deficits. Remainder of exam benign. DDx includes but is not limited to UGIB vs LGIB, symptomatic anemia, COVID vs other viral illness, dehydration, electrolyte abnormality. Will obtain labs and imaging including CBC, CMP, lipase, Mag, phos, troponin, BNP, type and screen, UA, COVID screening, CXR, and EKG. Will also provide IV fluid bolus and pain medication as need. Will continue to monitor and frequently reassess pending results of labs, treatments and final disposition.     Jing Brooks D.O  EMERGENCY MEDICINE  12:40 PM 09/05/2020    UPDATE  Labs reveal a stable normocytic anemia with Hgb of 11.4, HCT 35.3 with a thromocytopenia of 75. CMP with mild hyponatremia with Na of 130, hypokalemia with K of 3.2, Chloride 89, which consistent with dehydration. Lipase 88. Hypophetemia with a phos of 1. Remainder of labs benign. CXR with no acute abnormalities. Patient treated with IV potassium phosphate and consulted to hospital medicine, Dr. Mccarthy and admitted to his service. Patient aware and agreeable to the plan.     Jing Brooks D.O  EMERGENCY MEDICINE   4:05 PM 09/05/2020      Clinical Impression:       ICD-10-CM ICD-9-CM   1. Gastric bypass status for obesity  Z98.84 V45.86   2. Shortness of breath  R06.02 786.05   3. Hypophosphatemia  E83.39 275.3   4. Essential hypertension  I10 401.9   5. Anemia due to chronic blood loss  D50.0 280.0   6. Alcohol-induced mood disorder  F10.94 291.89   7. Chronic GI bleeding  K92.2 578.9   8. Weakness  R53.1 780.79             ED Disposition Condition    Observation                           Jing Brooks, DO  09/05/20 0147       Jing Brooks, DO  09/05/20 0151

## 2020-09-03 NOTE — ASSESSMENT & PLAN NOTE
likely duo to GI lost,she has phosphorus of 1.0,replacing per IV and PO..she had nausea,vomiting,diarrhea,but improved at this time.will monitor with daily labs.

## 2020-09-03 NOTE — ASSESSMENT & PLAN NOTE
Had few EGD in he past with gastric ulcer,ulcer on gastric bypass anastomose side,her HH is stable,FOBT positive,no sign of active bleeding,started on PPI drip,consulteed GI,need surveillance EGD.also GI bleeding could be duo to colon polyps.

## 2020-09-03 NOTE — H&P
[facility] Medicine  History & Physical    Patient Name: Alicia Chu  MRN: [id number]  Admission Date: 9/3/2020  Attending Physician: Ailyn Mccarthy    Primary Care Provider: Primary Doctor No         Patient information was obtained from patient and ER records.     Subjective:     Principal Problem:Hypophosphatemia    Chief Complaint:   Chief Complaint   Patient presents with    GI Bleeding     pt comes in via EMS from home with c/o bright red blood in stool x2 days. She states hx of GI bleed for which she was hospitalized here 3 weeks ago for. She states she has had diarrhea with nausea but denies vomiting. hx of gastric bypass 14 years ago.         HPI: 59 years old female with history of gastric ulcer,GI bleeding,anxiety,alcohol abuse,came to ER with CC of weakness,abdominal discomfort,she say she had also BRBPR after BM ,3 time today,she has history of gastric ulcer on EGD on 8.4.20,she say she was taking Protonix,she  is positive on ectal exam for FOBT with no sign of active bleeding,her HH is stable,same as before,she has phosphorus of 1.0,replacing per IV.she had nausea,vomiting,diarrhea,but improved at this time.    Past Medical History:   Diagnosis Date    Addiction to drug     Alcohol abuse     Colon polyps     Gastric bypass status for obesity 2006    GIB (gastrointestinal bleeding)     History of psychiatric hospitalization     Hx of psychiatric care     Hypertension     Postmenopausal HRT (hormone replacement therapy)     Psychiatric problem     Therapy     Ulcer        Past Surgical History:   Procedure Laterality Date    ABDOMINAL SURGERY      BREAST BIOPSY  2005    right    CHOLECYSTECTOMY      ESOPHAGOGASTRODUODENOSCOPY N/A 4/18/2019    Procedure: EGD (ESOPHAGOGASTRODUODENOSCOPY);  Surgeon: Mony Plunkett MD;  Location: [facility];  Service: Endoscopy;  Laterality: N/A;    ESOPHAGOGASTRODUODENOSCOPY N/A 8/4/2020    Procedure: EGD  "(ESOPHAGOGASTRODUODENOSCOPY);  Surgeon: Jose F Oreilly MD;  Location: [facility];  Service: Endoscopy;  Laterality: N/A;    GASTRIC BYPASS  2006    zackary en Y    HERNIA REPAIR  x2    LI hernia    HERNIA REPAIR      INFECTED SKIN DEBRIDEMENT  x3    KNEE ARTHROSCOPY W/ DEBRIDEMENT      right       Review of patient's allergies indicates:   Allergen Reactions    Aspirin      Due to gastric bypass    Ibuprofen      Other reaction(s): Due To Gastric Bypass  Due to gastric bypass    Inderal [propranolol]      "Felt bizarre"       No current facility-administered medications on file prior to encounter.      Current Outpatient Medications on File Prior to Encounter   Medication Sig    buPROPion (WELLBUTRIN) 100 MG tablet Take 1 tablet (100 mg total) by mouth once daily.    FLUoxetine 20 MG capsule Take 3 capsules (60 mg total) by mouth once daily. Also taking Fluoxetine 40 mg QAM    folic acid (FOLVITE) 1 MG tablet Take 1 tablet (1 mg total) by mouth once daily.    hydrOXYzine pamoate (VISTARIL) 25 MG Cap     multivitamin Tab Take 1 tablet by mouth once daily.    naltrexone (DEPADE) 50 mg tablet Take 1 tablet (50 mg total) by mouth once daily.    pantoprazole (PROTONIX) 40 MG tablet Take 1 tablet (40 mg total) by mouth 2 (two) times daily.    sucralfate (CARAFATE) 100 mg/mL suspension Take 10 mLs (1 g total) by mouth 4 (four) times daily before meals and nightly.    thiamine 100 MG tablet Take 1 tablet (100 mg total) by mouth once daily.    trazodone HCl (TRAZODONE ORAL)      Family History     Problem Relation (Age of Onset)    Alzheimer's disease Father (78)    Breast cancer Maternal Uncle    Cancer Mother, Paternal Grandfather    Dementia Father    Hyperlipidemia Father    Hypertension Father        Tobacco Use    Smoking status: Never Smoker    Smokeless tobacco: Never Used   Substance and Sexual Activity    Alcohol use: Yes     Alcohol/week: 0.0 standard drinks     Comment: 1 small bottle wine daily " (10oz) + 4 strong vodka cocktails daily    Drug use: No    Sexual activity: Yes     Partners: Male     Birth control/protection: None     Review of Systems   Constitutional: Positive for activity change and appetite change.   HENT: Negative for congestion and dental problem.    Eyes: Negative for discharge and itching.   Respiratory: Negative for apnea.    Cardiovascular: Negative for chest pain.   Gastrointestinal: Positive for abdominal pain, diarrhea, nausea and vomiting.   Endocrine: Negative for cold intolerance.   Genitourinary: Negative for decreased urine volume, difficulty urinating and dyspareunia.   Musculoskeletal: Negative for arthralgias.   Skin: Negative for color change and pallor.   Allergic/Immunologic: Negative for environmental allergies and food allergies.   Neurological: Positive for weakness. Negative for dizziness and facial asymmetry.   Hematological: Negative for adenopathy. Does not bruise/bleed easily.   Psychiatric/Behavioral: Negative for agitation and confusion.     Objective:     Vital Signs (Most Recent):  Temp: 98.3 °F (36.8 °C) (09/03/20 1232)  Pulse: 90 (09/03/20 1601)  Resp: 18 (09/03/20 1532)  BP: (!) 160/88 (09/03/20 1601)  SpO2: 100 % (09/03/20 1601) Vital Signs (24h Range):  Temp:  [98.3 °F (36.8 °C)] 98.3 °F (36.8 °C)  Pulse:  [83-99] 90  Resp:  [18-21] 18  SpO2:  [99 %-100 %] 100 %  BP: (131-160)/(85-92) 160/88     Weight: 67.1 kg (148 lb)  Body mass index is 24.63 kg/m².    Physical Exam  Constitutional:       Appearance: Normal appearance.   HENT:      Nose: Nose normal.      Mouth/Throat:      Mouth: Mucous membranes are dry.   Eyes:      Extraocular Movements: Extraocular movements intact.      Pupils: Pupils are equal, round, and reactive to light.   Neck:      Musculoskeletal: Normal range of motion and neck supple.   Cardiovascular:      Rate and Rhythm: Normal rate and regular rhythm.      Pulses: Normal pulses.      Heart sounds: Normal heart sounds.   Pulmonary:       Effort: Pulmonary effort is normal.      Breath sounds: Normal breath sounds.   Abdominal:      General: Abdomen is flat.      Palpations: Abdomen is soft.   Musculoskeletal: Normal range of motion.         General: No swelling or tenderness.   Skin:     General: Skin is warm and dry.   Neurological:      General: No focal deficit present.      Mental Status: She is alert and oriented to person, place, and time.   Psychiatric:         Mood and Affect: Mood normal.         Behavior: Behavior normal.           CRANIAL NERVES     CN III, IV, VI   Pupils are equal, round, and reactive to light.       Significant Labs:   BMP:   Recent Labs   Lab 09/03/20  1310   *   *   K 3.2*   CL 89*   CO2 24   BUN 17   CREATININE 0.9   CALCIUM 9.6   MG 1.9     CBC:   Recent Labs   Lab 09/03/20  1310   WBC 5.33   HGB 11.4*   HCT 35.5*   PLT 75*     CMP:   Recent Labs   Lab 09/03/20  1310   *   K 3.2*   CL 89*   CO2 24   *   BUN 17   CREATININE 0.9   CALCIUM 9.6   PROT 6.4   ALBUMIN 3.0*   BILITOT 1.7*   ALKPHOS 260*   AST 66*   ALT 35   ANIONGAP 17*   EGFRNONAA >60     Magnesium:   Recent Labs   Lab 09/03/20  1310   MG 1.9       Significant Imaging: reviewed.    Assessment/Plan:     * Hypophosphatemia    likely duo to GI lost,she has phosphorus of 1.0,replacing per IV and PO..she had nausea,vomiting,diarrhea,but improved at this time.will monitor with daily labs.    Chronic GI bleeding  Had few EGD in he past with gastric ulcer,ulcer on gastric bypass anastomose side,her HH is stable,FOBT positive,no sign of active bleeding,started on PPI drip,consulteed GI,need surveillance EGD.also GI bleeding could be duo to colon polyps.    Alcohol-induced mood disorder  Stable,at this time.      Ulcer at site of surgical anastomosis following bypass of stomach  Per EGD in the past. ,on PPI      Alcohol use disorder, severe, dependence  Unfortunately she still drink,no sign of withdraw at this time,she is alert time  3,started on scheduled valium also on vitamins.      Anemia due to chronic blood loss  Duo to chronic blood lost,HH is stable.      Essential hypertension  Started on clonidine,will help also with alcohol withdraw.      Gastric bypass status for obesity  Per history..      chronic gastric ulcer,per EGD on 8.4.20,on PPI,    VTE Risk Mitigation (From admission, onward)    None             Ailyn Mccarthy MD  Department of Hospital Medicine   [facility]

## 2020-09-03 NOTE — PLAN OF CARE
Dsicharge planning assessment completed with patient's assistance.  Patient from home with spouse and reported that she needed assistance with aDLs and her  cannot assist. Patient reported having a RW.  OP services include behavioral health services.  Patient is a readmit.  Patient reported that she has a PCP but was unable to recall his name.  Stated that she has had one visit with him.  When patient is medically stable, she will discharge to home.  Through further conversation and exploration of resources, patient stated that she has a stepdaughter and neighbors who might be able to help if needed.          09/03/20 1703   Discharge Assessment   Assessment Type Discharge Planning Assessment   Confirmed/corrected address and phone number on facesheet? Yes   Assessment information obtained from? Patient   Expected Length of Stay (days) 2   Communicated expected length of stay with patient/caregiver no  (TX and assessment ongoing.)   Prior to hospitilization cognitive status: Alert/Oriented   Prior to hospitalization functional status: Assistive Equipment  (RW)   Current cognitive status: Alert/Oriented   Current Functional Status: Assistive Equipment  (RW)   Facility Arrived From: Home   Lives With spouse   Able to Return to Prior Arrangements yes   Is patient able to care for self after discharge? Yes   Who are your caregiver(s) and their phone number(s)? Emergency contact:  spouse; Sukhdeep Bunn; [phone]   Patient's perception of discharge disposition home or selfcare   Readmission Within the Last 30 Days current reason for admission unrelated to previous admission   If yes, most recent facility name: [facility]   Patient currently being followed by outpatient case management? No   Patient currently receives any other outside agency services?   (Behavioral Health)   Equipment Currently Used at Home walker, rolling   Part D Coverage n/a   Do you have any problems affording any of your  prescribed medications? No   Is the patient taking medications as prescribed? yes   Does the patient have transportation home? Yes   Dialysis Name and Scheduled days n/a   Does the patient receive services at the [facility]? No   Discharge Plan A Home   Discharge Plan B Home   DME Needed Upon Discharge  none   Patient/Family in Agreement with Plan yes   Readmission Questionnaire   At the time of your discharge, did someone talk to you about what your health problems were? Yes   At the time of discharge, did someone talk to you about what to watch out for regarding worsening of your health problem? Yes   At the time of discharge, did someone talk to you about what to do if you experienced worsening of your health problem? Yes   At the time of discharge, did someone talk to you about which medication to take when you left the hospital and which ones to stop taking? Yes   At the time of discharge, did someone talk to you about when and where to follow up with a doctor after you left the hospital? Yes   What do you believe caused you to be sick enough to be re-admitted? Bright red blood in stool and weakness-unable to walk.   How often do you need to have someone help you when you read instructions, pamphlets, or other written material from your doctor or pharmacy? Never   Do you have problems taking your medications as prescribed? No   Do you have any problems affording any of  your prescribed medications? No   Do you have problems obtaining/receiving your medications? No   Living Arrangements house   Does the patient have family/friends to help with healtcare needs after discharge? other (comments)  (Initially patient stated that she did not have help at home.  Through more conversation, she stated that she has a stepdaughter and neighbors to help.)   Does your caregiver provide all the help you need? No   If no, what kind of help do you need at home? Cannot help at all; Needs assistance with walking, getting up,  and going to bathroom.   Are you currently feeling confused? No   Are you currently having problems thinking? No   Are you currently having memory problems? No   Have you felt down, depressed, or hopeless? 0  (Takes antidepressants.)   Have you felt little interest or pleasure in doing things? 0   In the last 7 days, my sleep quality was: poor   Teressa Araujo LM, [facility] [place]  9/3/20

## 2020-09-03 NOTE — SUBJECTIVE & OBJECTIVE
"Past Medical History:   Diagnosis Date    Addiction to drug     Alcohol abuse     Colon polyps     Gastric bypass status for obesity 2006    GIB (gastrointestinal bleeding)     History of psychiatric hospitalization     Hx of psychiatric care     Hypertension     Postmenopausal HRT (hormone replacement therapy)     Psychiatric problem     Therapy     Ulcer        Past Surgical History:   Procedure Laterality Date    ABDOMINAL SURGERY      BREAST BIOPSY  2005    right    CHOLECYSTECTOMY      ESOPHAGOGASTRODUODENOSCOPY N/A 4/18/2019    Procedure: EGD (ESOPHAGOGASTRODUODENOSCOPY);  Surgeon: Mony Plunkett MD;  Location: [facility] ENDO;  Service: Endoscopy;  Laterality: N/A;    ESOPHAGOGASTRODUODENOSCOPY N/A 8/4/2020    Procedure: EGD (ESOPHAGOGASTRODUODENOSCOPY);  Surgeon: Jsoe F Oreilly MD;  Location: [facility] ENDO;  Service: Endoscopy;  Laterality: N/A;    GASTRIC BYPASS  2006    zackary en Y    HERNIA REPAIR  x2    LI hernia    HERNIA REPAIR      INFECTED SKIN DEBRIDEMENT  x3    KNEE ARTHROSCOPY W/ DEBRIDEMENT      right       Review of patient's allergies indicates:   Allergen Reactions    Aspirin      Due to gastric bypass    Ibuprofen      Other reaction(s): Due To Gastric Bypass  Due to gastric bypass    Inderal [propranolol]      "Felt bizarre"       No current facility-administered medications on file prior to encounter.      Current Outpatient Medications on File Prior to Encounter   Medication Sig    buPROPion (WELLBUTRIN) 100 MG tablet Take 1 tablet (100 mg total) by mouth once daily.    FLUoxetine 20 MG capsule Take 3 capsules (60 mg total) by mouth once daily. Also taking Fluoxetine 40 mg QAM    folic acid (FOLVITE) 1 MG tablet Take 1 tablet (1 mg total) by mouth once daily.    hydrOXYzine pamoate (VISTARIL) 25 MG Cap     multivitamin Tab Take 1 tablet by mouth once daily.    naltrexone (DEPADE) 50 mg tablet Take 1 tablet (50 mg total) by mouth once daily.    pantoprazole (PROTONIX) 40 MG " Pt is a 88 y/o Female with PMHx of CHF, DM, HLD, HTN, CAD prior MI, c/o fatigue, "feeling off" and unable to get out of bed; Tmax 104F toay. Rash/redness on buttocks noted by home health aid. patient also complaining of +chronic cough, nonproductive.  No abdominal pain, no n/v/d. No chest pain.  States she has been feeling hot and cold over past few days, and took her temperature today, found fever of 104, and came to ED for evaluation.  No dysuria, no diarrhea. +constipation (chronic).  Increased diffuse leg swelling for past few days. patient lives at home with family. walks by herself with no use of walker. denies of any MEAD< chest pain on exertion. compliant with all her home medications (04 Sep 2019 22:22)    ER vitals: Tm 102.2, P 75, /77.  WBC 14.8 --> 12.2.  Cr 2.0 <-- 1.3.  PCT 0.49.  Ucx >100K E.coli.  Pt given dose of ceftriaxone, now on cipro for UTI.  ID consult called for further abx managment.      Sepsis:    - fever, leukocytosis. Source less likely UTI given persistent fevers, pt has infiltration of perirectal tissues, likely cellulitis.  Abx broadened     - Check lactate.  Pct elevated 0.49.  Pt with rising WBC, abx broadened on 9/11 (dapto/aztreanam/flagyl).    - Monitor hemodynamic status and BPs.  s/p IV fluid bolus, cont maintanence fluid.      - blood cx, urine cx.  RVP (-).  No pna on cxr      UTI:    - UA (+).  Ucx E.coli, sensitive to cipro, now on aztreonam    - Cr rising.  Check bladder scan/PVR, renal/bladder US,  Ctap no hydro or obstruction.  Renal US no obstruction - medical renal disease.     - Renally dose abx.          Perirectal cellulitis, r/o abscess:    - Seen by Surgery, area not amenable to I&D.  Perirectal infiltration seen on CTap, no abscess noted.  Abx broadened again on 9/11 due to rising wbc and persistent inflammation. (Dapto/azactam/flagyl) Pt with h/o PCN allergy.  Possibly drug reaction to vanco ?, no rash or hives reported.       - Dapto renally dosed at 6mg/kg q48 hrs.  (CrCl < 30)    - Ultrasound of perirectal tissues today without abscess.     - WBC continues to rise - check stool Cdiff.  Pt considered for indium scan - however not able to lie on her back for 3 hrs due to pain.  Can consider repeat CT.                  Will follow,    Sandrita Zaman  [phone] tablet Take 1 tablet (40 mg total) by mouth 2 (two) times daily.    sucralfate (CARAFATE) 100 mg/mL suspension Take 10 mLs (1 g total) by mouth 4 (four) times daily before meals and nightly.    thiamine 100 MG tablet Take 1 tablet (100 mg total) by mouth once daily.    trazodone HCl (TRAZODONE ORAL)      Family History     Problem Relation (Age of Onset)    Alzheimer's disease Father (78)    Breast cancer Maternal Uncle    Cancer Mother, Paternal Grandfather    Dementia Father    Hyperlipidemia Father    Hypertension Father        Tobacco Use    Smoking status: Never Smoker    Smokeless tobacco: Never Used   Substance and Sexual Activity    Alcohol use: Yes     Alcohol/week: 0.0 standard drinks     Comment: 1 small bottle wine daily (10oz) + 4 strong vodka cocktails daily    Drug use: No    Sexual activity: Yes     Partners: Male     Birth control/protection: None     Review of Systems   Constitutional: Positive for activity change and appetite change.   HENT: Negative for congestion and dental problem.    Eyes: Negative for discharge and itching.   Respiratory: Negative for apnea.    Cardiovascular: Negative for chest pain.   Gastrointestinal: Positive for abdominal pain, diarrhea, nausea and vomiting.   Endocrine: Negative for cold intolerance.   Genitourinary: Negative for decreased urine volume, difficulty urinating and dyspareunia.   Musculoskeletal: Negative for arthralgias.   Skin: Negative for color change and pallor.   Allergic/Immunologic: Negative for environmental allergies and food allergies.   Neurological: Positive for weakness. Negative for dizziness and facial asymmetry.   Hematological: Negative for adenopathy. Does not bruise/bleed easily.   Psychiatric/Behavioral: Negative for agitation and confusion.     Objective:     Vital Signs (Most Recent):  Temp: 98.3 °F (36.8 °C) (09/03/20 1232)  Pulse: 90 (09/03/20 1601)  Resp: 18 (09/03/20 1532)  BP: (!) 160/88 (09/03/20 1601)  SpO2: 100 %  (09/03/20 1601) Vital Signs (24h Range):  Temp:  [98.3 °F (36.8 °C)] 98.3 °F (36.8 °C)  Pulse:  [83-99] 90  Resp:  [18-21] 18  SpO2:  [99 %-100 %] 100 %  BP: (131-160)/(85-92) 160/88     Weight: 67.1 kg (148 lb)  Body mass index is 24.63 kg/m².    Physical Exam  Constitutional:       Appearance: Normal appearance.   HENT:      Nose: Nose normal.      Mouth/Throat:      Mouth: Mucous membranes are dry.   Eyes:      Extraocular Movements: Extraocular movements intact.      Pupils: Pupils are equal, round, and reactive to light.   Neck:      Musculoskeletal: Normal range of motion and neck supple.   Cardiovascular:      Rate and Rhythm: Normal rate and regular rhythm.      Pulses: Normal pulses.      Heart sounds: Normal heart sounds.   Pulmonary:      Effort: Pulmonary effort is normal.      Breath sounds: Normal breath sounds.   Abdominal:      General: Abdomen is flat.      Palpations: Abdomen is soft.   Musculoskeletal: Normal range of motion.         General: No swelling or tenderness.   Skin:     General: Skin is warm and dry.   Neurological:      General: No focal deficit present.      Mental Status: She is alert and oriented to person, place, and time.   Psychiatric:         Mood and Affect: Mood normal.         Behavior: Behavior normal.           CRANIAL NERVES     CN III, IV, VI   Pupils are equal, round, and reactive to light.       Significant Labs:   BMP:   Recent Labs   Lab 09/03/20  1310   *   *   K 3.2*   CL 89*   CO2 24   BUN 17   CREATININE 0.9   CALCIUM 9.6   MG 1.9     CBC:   Recent Labs   Lab 09/03/20  1310   WBC 5.33   HGB 11.4*   HCT 35.5*   PLT 75*     CMP:   Recent Labs   Lab 09/03/20  1310   *   K 3.2*   CL 89*   CO2 24   *   BUN 17   CREATININE 0.9   CALCIUM 9.6   PROT 6.4   ALBUMIN 3.0*   BILITOT 1.7*   ALKPHOS 260*   AST 66*   ALT 35   ANIONGAP 17*   EGFRNONAA >60     Magnesium:   Recent Labs   Lab 09/03/20  1310   MG 1.9       Significant Imaging: reviewed.

## 2020-09-03 NOTE — HPI
59 years old female with history of gastric ulcer,GI bleeding,anxiety,alcohol abuse,came to ER with CC of weakness,abdominal discomfort,she say she had also BRBPR after BM ,3 time today,she has history of gastric ulcer on EGD on 8.4.20,she say she was taking Protonix,she  is positive on ectal exam for FOBT with no sign of active bleeding,her HH is stable,same as before,she has phosphorus of 1.0,replacing per IV.she had nausea,vomiting,diarrhea,but improved at this time.

## 2020-09-03 NOTE — ED TRIAGE NOTES
Pt reports to the ED with complaints of generalized weakness that has worsen over the past 4 days. Pt reports she is unable to walk and has been using a walker. This morning pt began bleeding rectally w/ moderate amount of blood found in stool. Pt is having incontience of bowel and bladder and feces noted to bilateral extremities and abdomen. Pt reports SOB, nausea, extreme exhaustion, lower adb pain and dizziness. Denies chest pain, vomiting. Bilateral edema in extremities noted. NAD noted.  AAOx4. VSS. Will continue to monitor.

## 2020-09-03 NOTE — PROGRESS NOTES
Attempted discharge planning assessment.  Dr. Ayala at the bedside.  Teressa Araujo LMSW, CCM  9/3/20

## 2020-09-03 NOTE — ASSESSMENT & PLAN NOTE
Unfortunately she still drink,no sign of withdraw at this time,she is alert time 3,started on scheduled valium also on vitamins.

## 2020-09-04 VITALS
TEMPERATURE: 98 F | DIASTOLIC BLOOD PRESSURE: 68 MMHG | RESPIRATION RATE: 16 BRPM | WEIGHT: 159.63 LBS | BODY MASS INDEX: 26.6 KG/M2 | HEIGHT: 65 IN | HEART RATE: 73 BPM | SYSTOLIC BLOOD PRESSURE: 101 MMHG | OXYGEN SATURATION: 100 %

## 2020-09-04 LAB
ALBUMIN SERPL BCP-MCNC: 2.2 G/DL (ref 3.5–5.2)
ALP SERPL-CCNC: 180 U/L (ref 55–135)
ALT SERPL W/O P-5'-P-CCNC: 22 U/L (ref 10–44)
ANION GAP SERPL CALC-SCNC: 9 MMOL/L (ref 8–16)
AST SERPL-CCNC: 32 U/L (ref 10–40)
BACTERIA #/AREA URNS HPF: ABNORMAL /HPF
BASOPHILS # BLD AUTO: 0.02 K/UL (ref 0–0.2)
BASOPHILS NFR BLD: 0.7 % (ref 0–1.9)
BILIRUB SERPL-MCNC: 0.9 MG/DL (ref 0.1–1)
BILIRUB UR QL STRIP: NEGATIVE
BILIRUB UR QL STRIP: NEGATIVE
BUN SERPL-MCNC: 10 MG/DL (ref 6–20)
CALCIUM SERPL-MCNC: 7.8 MG/DL (ref 8.7–10.5)
CHLORIDE SERPL-SCNC: 96 MMOL/L (ref 95–110)
CLARITY UR: ABNORMAL
CLARITY UR: ABNORMAL
CO2 SERPL-SCNC: 27 MMOL/L (ref 23–29)
COLOR UR: YELLOW
COLOR UR: YELLOW
CREAT SERPL-MCNC: 0.7 MG/DL (ref 0.5–1.4)
DIFFERENTIAL METHOD: ABNORMAL
EOSINOPHIL # BLD AUTO: 0.1 K/UL (ref 0–0.5)
EOSINOPHIL NFR BLD: 3.3 % (ref 0–8)
ERYTHROCYTE [DISTWIDTH] IN BLOOD BY AUTOMATED COUNT: 17.1 % (ref 11.5–14.5)
EST. GFR  (AFRICAN AMERICAN): >60 ML/MIN/1.73 M^2
EST. GFR  (NON AFRICAN AMERICAN): >60 ML/MIN/1.73 M^2
GLUCOSE SERPL-MCNC: 127 MG/DL (ref 70–110)
GLUCOSE UR QL STRIP: NEGATIVE
GLUCOSE UR QL STRIP: NEGATIVE
HCT VFR BLD AUTO: 28.7 % (ref 37–48.5)
HGB BLD-MCNC: 10.5 G/DL (ref 12–16)
HGB BLD-MCNC: 9.4 G/DL (ref 12–16)
HGB UR QL STRIP: ABNORMAL
HGB UR QL STRIP: ABNORMAL
IMM GRANULOCYTES # BLD AUTO: 0.04 K/UL (ref 0–0.04)
IMM GRANULOCYTES NFR BLD AUTO: 1.5 % (ref 0–0.5)
KETONES UR QL STRIP: NEGATIVE
KETONES UR QL STRIP: NEGATIVE
LEUKOCYTE ESTERASE UR QL STRIP: ABNORMAL
LEUKOCYTE ESTERASE UR QL STRIP: ABNORMAL
LYMPHOCYTES # BLD AUTO: 0.8 K/UL (ref 1–4.8)
LYMPHOCYTES NFR BLD: 30.3 % (ref 18–48)
MAGNESIUM SERPL-MCNC: 1.5 MG/DL (ref 1.6–2.6)
MCH RBC QN AUTO: 28.9 PG (ref 27–31)
MCHC RBC AUTO-ENTMCNC: 32.8 G/DL (ref 32–36)
MCV RBC AUTO: 88 FL (ref 82–98)
MICROSCOPIC COMMENT: ABNORMAL
MONOCYTES # BLD AUTO: 0.4 K/UL (ref 0.3–1)
MONOCYTES NFR BLD: 15 % (ref 4–15)
NEUTROPHILS # BLD AUTO: 1.4 K/UL (ref 1.8–7.7)
NEUTROPHILS NFR BLD: 49.2 % (ref 38–73)
NITRITE UR QL STRIP: NEGATIVE
NITRITE UR QL STRIP: NEGATIVE
NRBC BLD-RTO: 1 /100 WBC
PH UR STRIP: 7 [PH] (ref 5–8)
PH UR STRIP: 7 [PH] (ref 5–8)
PHOSPHATE SERPL-MCNC: 2.2 MG/DL (ref 2.7–4.5)
PLATELET # BLD AUTO: 59 K/UL (ref 150–350)
PMV BLD AUTO: 10.6 FL (ref 9.2–12.9)
POTASSIUM SERPL-SCNC: 2.7 MMOL/L (ref 3.5–5.1)
PROT SERPL-MCNC: 4.8 G/DL (ref 6–8.4)
PROT UR QL STRIP: NEGATIVE
PROT UR QL STRIP: NEGATIVE
RBC # BLD AUTO: 3.25 M/UL (ref 4–5.4)
RBC #/AREA URNS HPF: 50 /HPF (ref 0–4)
SODIUM SERPL-SCNC: 132 MMOL/L (ref 136–145)
SP GR UR STRIP: 1 (ref 1–1.03)
SP GR UR STRIP: 1 (ref 1–1.03)
URN SPEC COLLECT METH UR: ABNORMAL
URN SPEC COLLECT METH UR: ABNORMAL
UROBILINOGEN UR STRIP-ACNC: NEGATIVE EU/DL
UROBILINOGEN UR STRIP-ACNC: NEGATIVE EU/DL
WBC # BLD AUTO: 2.74 K/UL (ref 3.9–12.7)
WBC #/AREA URNS HPF: 22 /HPF (ref 0–5)

## 2020-09-04 PROCEDURE — 81000 URINALYSIS NONAUTO W/SCOPE: CPT

## 2020-09-04 PROCEDURE — C9113 INJ PANTOPRAZOLE SODIUM, VIA: HCPCS | Performed by: HOSPITALIST

## 2020-09-04 PROCEDURE — 25000003 PHARM REV CODE 250: Performed by: HOSPITALIST

## 2020-09-04 PROCEDURE — 63600175 PHARM REV CODE 636 W HCPCS: Performed by: INTERNAL MEDICINE

## 2020-09-04 PROCEDURE — 96374 THER/PROPH/DIAG INJ IV PUSH: CPT | Mod: 59

## 2020-09-04 PROCEDURE — 97161 PT EVAL LOW COMPLEX 20 MIN: CPT

## 2020-09-04 PROCEDURE — 96375 TX/PRO/DX INJ NEW DRUG ADDON: CPT

## 2020-09-04 PROCEDURE — 63600175 PHARM REV CODE 636 W HCPCS: Performed by: HOSPITALIST

## 2020-09-04 PROCEDURE — 36415 COLL VENOUS BLD VENIPUNCTURE: CPT

## 2020-09-04 PROCEDURE — 25000003 PHARM REV CODE 250: Performed by: INTERNAL MEDICINE

## 2020-09-04 PROCEDURE — 97110 THERAPEUTIC EXERCISES: CPT

## 2020-09-04 PROCEDURE — 80053 COMPREHEN METABOLIC PANEL: CPT

## 2020-09-04 PROCEDURE — G0378 HOSPITAL OBSERVATION PER HR: HCPCS

## 2020-09-04 PROCEDURE — 85025 COMPLETE CBC W/AUTO DIFF WBC: CPT

## 2020-09-04 PROCEDURE — 83735 ASSAY OF MAGNESIUM: CPT

## 2020-09-04 PROCEDURE — 96376 TX/PRO/DX INJ SAME DRUG ADON: CPT

## 2020-09-04 PROCEDURE — 97535 SELF CARE MNGMENT TRAINING: CPT

## 2020-09-04 PROCEDURE — 84100 ASSAY OF PHOSPHORUS: CPT

## 2020-09-04 PROCEDURE — 85018 HEMOGLOBIN: CPT | Mod: 91

## 2020-09-04 PROCEDURE — 25000003 PHARM REV CODE 250: Performed by: NURSE PRACTITIONER

## 2020-09-04 PROCEDURE — 97166 OT EVAL MOD COMPLEX 45 MIN: CPT

## 2020-09-04 PROCEDURE — 96366 THER/PROPH/DIAG IV INF ADDON: CPT

## 2020-09-04 PROCEDURE — 87086 URINE CULTURE/COLONY COUNT: CPT

## 2020-09-04 PROCEDURE — 63600175 PHARM REV CODE 636 W HCPCS: Performed by: NURSE PRACTITIONER

## 2020-09-04 RX ORDER — NITROFURANTOIN (MACROCRYSTALS) 100 MG/1
100 CAPSULE ORAL EVERY 12 HOURS
Qty: 8 CAPSULE | Refills: 0 | Status: SHIPPED | OUTPATIENT
Start: 2020-09-04 | End: 2020-09-08

## 2020-09-04 RX ORDER — POTASSIUM CHLORIDE 20 MEQ/1
40 TABLET, EXTENDED RELEASE ORAL ONCE
Status: COMPLETED | OUTPATIENT
Start: 2020-09-04 | End: 2020-09-04

## 2020-09-04 RX ORDER — MAGNESIUM SULFATE HEPTAHYDRATE 40 MG/ML
2 INJECTION, SOLUTION INTRAVENOUS ONCE
Status: COMPLETED | OUTPATIENT
Start: 2020-09-04 | End: 2020-09-04

## 2020-09-04 RX ORDER — NITROFURANTOIN (MACROCRYSTALS) 100 MG/1
100 CAPSULE ORAL EVERY 12 HOURS
Qty: 8 CAPSULE | Refills: 0 | Status: SHIPPED | OUTPATIENT
Start: 2020-09-04 | End: 2020-09-04 | Stop reason: SDUPTHER

## 2020-09-04 RX ORDER — SODIUM CHLORIDE AND POTASSIUM CHLORIDE 150; 900 MG/100ML; MG/100ML
INJECTION, SOLUTION INTRAVENOUS CONTINUOUS
Status: DISCONTINUED | OUTPATIENT
Start: 2020-09-04 | End: 2020-09-04

## 2020-09-04 RX ORDER — POTASSIUM CHLORIDE 20 MEQ/1
60 TABLET, EXTENDED RELEASE ORAL ONCE
Status: DISCONTINUED | OUTPATIENT
Start: 2020-09-04 | End: 2020-09-04

## 2020-09-04 RX ORDER — POTASSIUM CHLORIDE 7.45 MG/ML
10 INJECTION INTRAVENOUS
Status: DISPENSED | OUTPATIENT
Start: 2020-09-04 | End: 2020-09-04

## 2020-09-04 RX ORDER — POTASSIUM CHLORIDE 7.45 MG/ML
10 INJECTION INTRAVENOUS
Status: DISCONTINUED | OUTPATIENT
Start: 2020-09-04 | End: 2020-09-04

## 2020-09-04 RX ADMIN — BUPROPION HYDROCHLORIDE 100 MG: 100 TABLET, FILM COATED ORAL at 08:09

## 2020-09-04 RX ADMIN — SODIUM CHLORIDE AND POTASSIUM CHLORIDE: 9; 1.49 INJECTION, SOLUTION INTRAVENOUS at 06:09

## 2020-09-04 RX ADMIN — POTASSIUM CHLORIDE 10 MEQ: 7.46 INJECTION, SOLUTION INTRAVENOUS at 08:09

## 2020-09-04 RX ADMIN — SODIUM PHOSPHATE, MONOBASIC, MONOHYDRATE 30 MMOL: 276; 142 INJECTION, SOLUTION INTRAVENOUS at 01:09

## 2020-09-04 RX ADMIN — FOLIC ACID 1 MG: 1 TABLET ORAL at 08:09

## 2020-09-04 RX ADMIN — DEXTROSE 8 MG/HR: 50 INJECTION, SOLUTION INTRAVENOUS at 08:09

## 2020-09-04 RX ADMIN — POTASSIUM PHOSPHATE, MONOBASIC 1000 MG: 500 TABLET, SOLUBLE ORAL at 08:09

## 2020-09-04 RX ADMIN — DIAZEPAM 5 MG: 5 TABLET ORAL at 05:09

## 2020-09-04 RX ADMIN — Medication 100 MG: at 08:09

## 2020-09-04 RX ADMIN — POTASSIUM CHLORIDE 10 MEQ: 7.46 INJECTION, SOLUTION INTRAVENOUS at 11:09

## 2020-09-04 RX ADMIN — POTASSIUM CHLORIDE 40 MEQ: 1500 TABLET, EXTENDED RELEASE ORAL at 05:09

## 2020-09-04 RX ADMIN — SODIUM CHLORIDE: 0.9 INJECTION, SOLUTION INTRAVENOUS at 06:09

## 2020-09-04 RX ADMIN — DEXTROSE 8 MG/HR: 50 INJECTION, SOLUTION INTRAVENOUS at 02:09

## 2020-09-04 RX ADMIN — FLUOXETINE HYDROCHLORIDE 20 MG: 20 SOLUTION ORAL at 08:09

## 2020-09-04 RX ADMIN — CLONIDINE HYDROCHLORIDE 0.1 MG: 0.1 TABLET ORAL at 08:09

## 2020-09-04 RX ADMIN — POTASSIUM CHLORIDE 10 MEQ: 7.46 INJECTION, SOLUTION INTRAVENOUS at 09:09

## 2020-09-04 RX ADMIN — DIAZEPAM 5 MG: 5 TABLET ORAL at 01:09

## 2020-09-04 RX ADMIN — MAGNESIUM SULFATE IN WATER 2 G: 40 INJECTION, SOLUTION INTRAVENOUS at 05:09

## 2020-09-04 RX ADMIN — SUCRALFATE 1 G: 1 SUSPENSION ORAL at 01:09

## 2020-09-04 RX ADMIN — SUCRALFATE 1 G: 1 SUSPENSION ORAL at 05:09

## 2020-09-04 NOTE — PLAN OF CARE
Problem: Adult Inpatient Plan of Care  Goal: Plan of Care Review  Outcome: Ongoing, Progressing     Problem: Fall Injury Risk  Goal: Absence of Fall and Fall-Related Injury  Outcome: Ongoing, Progressing     Problem: Adjustment to Illness (Gastrointestinal Bleeding)  Goal: Optimal Coping with Acute Illness  Outcome: Ongoing, Progressing     Problem: Bleeding (Gastrointestinal Bleeding)  Goal: Hemostasis  Outcome: Ongoing, Progressing     Problem: Skin Injury Risk Increased  Goal: Skin Health and Integrity  Outcome: Ongoing, Progressing     Problem: Wound  Goal: Optimal Wound Healing  Outcome: Ongoing, Progressing

## 2020-09-04 NOTE — PT/OT/SLP EVAL
Occupational Therapy   Evaluation    Name: Alicia Chu  MRN: [id number]  Admitting Diagnosis:  Hypophosphatemia      Recommendations:     Discharge Recommendations: rehabilitation facility  Discharge Equipment Recommendations:  walker, rolling  Barriers to discharge: Inaccessible home environment; pt is at a high risk of falls, unplanned readmission, and morbidity if d/c home; not at PLOF     Assessment:     Alicia Chu is a 59 y.o. female with a medical diagnosis of Hypophosphatemia. Performance deficits affecting function: weakness, gait instability, decreased upper extremity function, decreased ROM, impaired endurance, impaired balance, decreased lower extremity function, decreased safety awareness, impaired skin, pain, impaired self care skills, impaired functional mobilty, edema, decreased coordination.      MIN A toilet t/f; pt presents with decreased functional activity tolerance, decreased independence with all ADLs and all aspects of functional mobility. Pt is her husbands' caretaker. Pt will benefit from inpatient rehab at discharge in order to address functional deficits and regain PLOF.    Rehab Prognosis: Fair +; patient would benefit from acute skilled OT services to address these deficits and reach maximum level of function.       Plan:     Patient to be seen (5-6x/week) to address the above listed problems via self-care/home management, therapeutic activities, therapeutic exercises  · Plan of Care Expires: 09/18/20  · Plan of Care Reviewed with: patient    Subjective     Chief Complaint: pain to buttocks with all aspects of mobility   Patient/Family Comments/goals: very weak     Occupational Profile:  Living Environment: Pt lives with her  in a 2 story home with threshold at entry. Half bath on the first floor. Pt's  sleeps in a hospital bed on the first floor. Pt sleeps on the sofa on the first floor, but goes to the second floor daily. 13 steps to second floor with HR on R  ascending.   Previous level of function: Pt was independent PTA; with exception the last 4 days, pt has borrowed his 's RW. Pt helps her  out of bed; she does the cooking and cleaning. Independent with ADLs. Pt reports she was unable to clean herself for the last 4 days, sitting in urine and bowels.   Roles and Routines: driving, retired   Equipment Used at Home:  bedside commode  Assistance upon Discharge: step-daughter works;  is physically unable to assist (uses rollator, deconditioned), pt reports 2 neighbors help     Pain/Comfort:  · Pain Rating 1: 10/10  · Location - Side 1: Bilateral  · Location - Orientation 1: generalized  · Location 1: (buttocks)  · Pain Addressed 1: Pre-medicate for activity, Reposition, Nurse notified  · Pain Rating Post-Intervention 1: 8/10    Patients cultural, spiritual, Rastafari conflicts given the current situation: no    Objective:     Communicated with: nurseLucy, prior to session.  Patient found HOB elevated with bed alarm, peripheral IV, telemetry upon OT entry to room.    General Precautions: Standard, fall   Orthopedic Precautions:N/A   Braces: N/A     Occupational Performance:    Bed Mobility:   Pt required prolonged time with all aspects of bed mobility d/t increased pain to buttocks.   · Patient completed Bridging with stand by assistance with OT demo  · Patient completed Scooting anteriorly to EOB with SBA  · Patient completed Supine to Sit with stand by assistance, with side rail and HOB elevated    Functional Mobility/Transfers: Pt required prolonged time with all aspects of functional mobility d/t slow barry and pain.   · Patient completed Sit > Stand Transfer with minimum assistance  with  rolling walker from the bed   · Patient completed Bed> Bathroom Step Transfer technique with contact guard assistance with  rolling walker  · Patient completed Toilet Transfer to toilet (stand>sit) with RW and minimum assistance with rolling walker and  grab bar with max verbal cueing   · Patient completed Toilet Transfer sit>stand from toilet with grab bar and RW and minimum assistance with RW   · Pt was unable to walk back to the chair in the room, so chair was brought close by and max verbal cueing for safety with transfer into the chair.     Activities of Daily Living:  · Grooming: set-up with comb while seated to comb her hair     · Upper Body Dressing: minimum assistance to don back gown   · Lower Body Dressing: total assistance to don socks  · Toileting: set-up with toilet paper for pericare on commode after BM        Cognitive/Visual Perceptual:  Cognitive/Psychosocial Skills:     -       Oriented to: Person, Place, Time and Situation   -       Follows Commands/attention:Follows most simple commands  -       Communication: clear/fluent  -       Memory: No Deficits noted  -       Safety awareness/insight to disability: impaired   -       Mood/Affect/Coping skills/emotional control: Appropriate to situation  Visual/Perceptual:      -Intact  R/L discrimination      Physical Exam:  Balance:    -       seated: SBA; standing: CGA with RW  Postural examination/scapula alignment:    -       Rounded shoulders  -       Forward head  Skin integrity: altered skin integrity to buttocks (some blood with clumps of cream/lotion)   Edema:  pitting edema to BLE  Sensation:    -       Intact  light/touch BUE  Dominant hand:    -       Right  Upper Extremity Range of Motion:     -       Right Upper Extremity: WFL  -       Left Upper Extremity: WFL  Upper Extremity Strength:    -       Right Upper Extremity: WFL  -       Left Upper Extremity: WFL   Strength:    -       Right Upper Extremity: WFL  -       Left Upper Extremity: WFL  Fine Motor Coordination:    -       Intact  Left hand, manipulation of objects and Right hand, manipulation of objects  Gross motor coordination:   impaired 2* weakness    AMPAC 6 Click ADL:  AMPAC Total Score: 16    Treatment & Education:  · Pt  educated on OT role/POC.   · Importance of OOB activity with staff assistance  · Encouraged pt to continue to get up to the BSC with staff and avoid bed pan to increase her functional activity tolerance; OT set-up BSC  · Safety during functional t/f and mobility   · Prolonged time taken with all aspects of functional mobility with slow barry within RW.   · White board updated   · 1x15 elbow flexion/extension AROM, 1x20 forward punches, 1x10 shoulder flexion/extension  · Handout provided on BUE AROM to all planes; encouraged pt to complete 10-15 reps, 2-3x/day   · Multiple self-care tasks/functional mobility completed- assistance level noted above   · All questions/concerns answered within OT scope of practice     Education:    Patient left seated on the green air cushion reclined in chair with BLE elevated with all lines intact, call button in reach, chair alarm on and nurse, Lucy, and PCT, Acacia, notified. Some blood on the toilet seated from buttocks- OT notified nurse and cleaned it with bleach.     GOALS:   Multidisciplinary Problems     Occupational Therapy Goals        Problem: Occupational Therapy Goal    Goal Priority Disciplines Outcome Interventions   Occupational Therapy Goal     OT, PT/OT Ongoing, Progressing    Description: Goals to be met by: 09/18/20     Patient will increase functional independence with ADLs by performing:    UE Dressing with Set-up Assistance.  LE Dressing with Supervision.  Grooming while standing at sink with Supervision.  Toileting from toilet with Supervision for hygiene and clothing management.   Supine to sit with Supervision.  Step transfer with Modified [place]  Toilet transfer to toilet with Modified [place].  Upper extremity exercise program x15 reps per handout, with independence.                     History:     Past Medical History:   Diagnosis Date    Addiction to drug     Alcohol abuse     Colon polyps     Gastric bypass status for obesity 2006     GIB (gastrointestinal bleeding)     History of psychiatric hospitalization     Hx of psychiatric care     Hypertension     Postmenopausal HRT (hormone replacement therapy)     Psychiatric problem     Therapy     Ulcer        Past Surgical History:   Procedure Laterality Date    ABDOMINAL SURGERY      BREAST BIOPSY  2005    right    CHOLECYSTECTOMY      ESOPHAGOGASTRODUODENOSCOPY N/A 4/18/2019    Procedure: EGD (ESOPHAGOGASTRODUODENOSCOPY);  Surgeon: Mony Plnukett MD;  Location: [facility] ENDO;  Service: Endoscopy;  Laterality: N/A;    ESOPHAGOGASTRODUODENOSCOPY N/A 8/4/2020    Procedure: EGD (ESOPHAGOGASTRODUODENOSCOPY);  Surgeon: Jose F Oreilly MD;  Location: [facility] ENDO;  Service: Endoscopy;  Laterality: N/A;    GASTRIC BYPASS  2006    zackary en Y    HERNIA REPAIR  x2    LI hernia    HERNIA REPAIR      INFECTED SKIN DEBRIDEMENT  x3    KNEE ARTHROSCOPY W/ DEBRIDEMENT      right       Time Tracking:     OT Date of Treatment: 09/04/20  OT Start Time: 1026  OT Stop Time: 1114  OT Total Time (min): 48 min    Billable Minutes:Evaluation 15 min  Self Care/Home Management 22 min  Therapeutic Exercise 11 min  Total Time 48 min    Angelina Youngblood OT  9/4/2020

## 2020-09-04 NOTE — NURSING
Patient returned from procedure. Telemetry monitor continued. Patient accompanied back in chair. Denies pain or discomfort. Will continue to monitor.

## 2020-09-04 NOTE — PT/OT/SLP EVAL
Physical Therapy Evaluation    Patient Name:  Alicia Chu   MRN:  [id number]    Recommendations:     Discharge Recommendations:  rehabilitation facility   Discharge Equipment Recommendations: walker, rolling   Barriers to discharge: PTA, pt was ambulating, performing ADL's and taking care of her  independently.  Pt currently requires max A  for sit to stand from chair and is only able to ambulate approx 6' with RW and Min A.  Pt's bedroom and bathroom are upstairs(approx 10) with HR available.  Pt is at high risk for falls and re-admission if she returns home at present time.  Pt could benefit from an aggressive multidisciplinary approach that can only be found at inpatient rehab in order to help restor patient to highest level of functional independence.    Assessment:     Alicia Chu is a 59 y.o. female admitted with a medical diagnosis of Hypophosphatemia.  She presents with the following impairments/functional limitations:  weakness, gait instability, decreased upper extremity function, decreased ROM, impaired endurance, impaired balance, decreased lower extremity function, impaired coordination, impaired joint extensibility, decreased safety awareness, impaired fine motor, impaired muscle length, impaired self care skills, pain, impaired skin, impaired functional mobilty, decreased coordination, edema .    Rehab Prognosis: Good; patient would benefit from acute skilled PT services to address these deficits and reach maximum level of function.    Recent Surgery: * No surgery found *      Plan:     During this hospitalization, patient to be seen 6 x/week to address the identified rehab impairments via gait training, therapeutic activities, therapeutic exercises and progress toward the following goals:    · Plan of Care Expires:  09/18/20    Subjective     Chief Complaint: pain  Patient/Family Comments/goals: Pt agreeable to evaluation, limited 2/2 pain  Pain/Comfort:  · Pain Rating 1: 8/10  · Location  1: (Buttocks and anterior L ankle joint)  · Pain Rating Post-Intervention 1: 7/10  · Pain Addressed 2: Reposition, Distraction, Cessation of Activity, Nurse notified  · Pain Rating Post-Intervention 2: 7/10    Patients cultural, spiritual, Roman Catholic conflicts given the current situation: no    Living Environment:  Pt lives with her  who Requires assistance, in a 2-matheus home with her bathroom and bedroom on 2nd level.   sleeps downstairs in hospital bed  Prior to admission, patients level of function was Independent, Assisting .  Equipment used at home: bedside commode.  DME owned (not currently used): none.  Upon discharge, patient will have assistance from Neighbor?, Stepdaughter?.    Objective:     Communicated with nsg prior to session.  Patient found up in chair with peripheral IV(chair alarm)  upon PT entry to room.    General Precautions: Standard, fall   Orthopedic Precautions:N/A   Braces: N/A     Exams:  · Cognitive Exam:  Patient is oriented to Person, Place, Time and Situation  · Gross Motor Coordination:  impaired 2/2 pain, gen weakness, deconditioning  · Postural Exam:  Patient presented with the following abnormalities:    · -       Rounded shoulders  · -       Forward head  · Sensation:    · -       Intact  light/touch B LE's  · Skin Integrity/Edema:      · -       Skin integrity: erythema L 5th toe  · -       Edema: Pitting B LE's, L>R  · RLE ROM: WFL  · RLE Strength: WFL  · LLE ROM: WFL except Dflx PROM-3*  · LLE Strength: WFL except Dflx 2+/5    Functional Mobility:  · Transfers:     · Sit to Stand:  maximal assistance and with Vc/TC for forward weight shift over JUANI and hand placemetn/safety with rolling walker  · Gait: Gait training approx 6' with RW and Min A for walker management/balance and VC/TC for distribution of weight through UE's to walker, walker management/safety and increased trunk ext.  Pt ambulates with max decreased step length and barry indicating high  risk for falls  · Balance: Fair+ sit, Fair stand    Therapeutic Activities and Exercises:   Pt received L Dorsal [place] Glides joint mobilization 10x10 sec followed by L HCS 4x30 sec.  T/f and gait training as above.  Instructed patient on pursed lip breathing for SOB, pain, anxiety throughout treatment session.  Pt able to return demonstration with max VC.  Instructed pt on performing B AP's, TKE's, and GS while up in chair.  Pt verbalized/demonstrated understanding with VC?TC to perform correctly.    AM-PAC 6 CLICK MOBILITY  Total Score:14     Patient left up in chair with call button in reach, chair alarm on and nsg notified.    GOALS:   Multidisciplinary Problems     Physical Therapy Goals        Problem: Physical Therapy Goal    Goal Priority Disciplines Outcome Goal Variances Interventions   Physical Therapy Goal     PT, PT/OT Ongoing, Progressing     Description: Goals to be met by: 20     Patient will increase functional independence with mobility by performin. Supine to sit with Modified [place]  2. Sit to stand transfer with Modified [place]  3. Gait  x 150 feet with Modified [place] using Rolling Walker.   4. Ascend/descend 10 stair with right Handrails Modified [place]    5. Lower extremity exercise program 2x10 reps per handout, with independence  6.  Increase L ankle Dflx PROM to at least 5* in order for patient to perform sit to stand t/f Mod I                     History:     Past Medical History:   Diagnosis Date    Addiction to drug     Alcohol abuse     Colon polyps     Gastric bypass status for obesity     GIB (gastrointestinal bleeding)     History of psychiatric hospitalization     Hx of psychiatric care     Hypertension     Postmenopausal HRT (hormone replacement therapy)     Psychiatric problem     Therapy     Ulcer        Past Surgical History:   Procedure Laterality Date    ABDOMINAL SURGERY      BREAST BIOPSY  2005    right     CHOLECYSTECTOMY      ESOPHAGOGASTRODUODENOSCOPY N/A 4/18/2019    Procedure: EGD (ESOPHAGOGASTRODUODENOSCOPY);  Surgeon: Mony Plunkett MD;  Location: [facility] ENDO;  Service: Endoscopy;  Laterality: N/A;    ESOPHAGOGASTRODUODENOSCOPY N/A 8/4/2020    Procedure: EGD (ESOPHAGOGASTRODUODENOSCOPY);  Surgeon: Jose F Oreilly MD;  Location: [facility] ENDO;  Service: Endoscopy;  Laterality: N/A;    GASTRIC BYPASS  2006    zackary en Y    HERNIA REPAIR  x2    LI hernia    HERNIA REPAIR      INFECTED SKIN DEBRIDEMENT  x3    KNEE ARTHROSCOPY W/ DEBRIDEMENT      right       Time Tracking:     PT Received On: 09/04/20  PT Start Time: 1128     PT Stop Time: 1153  PT Total Time (min): 25 min     Billable Minutes: Evaluation 15TE 10      Audra Salas, PT  09/04/2020

## 2020-09-04 NOTE — PLAN OF CARE
Problem: Physical Therapy Goal  Goal: Physical Therapy Goal  Description: Goals to be met by: 20     Patient will increase functional independence with mobility by performin. Supine to sit with Modified [place]  2. Sit to stand transfer with Modified [place]  3. Gait  x 150 feet with Modified [place] using Rolling Walker.   4. Ascend/descend 10 stair with right Handrails Modified [place]    5. Lower extremity exercise program 2x10 reps per handout, with independence  6.  Increase L ankle Dflx PROM to at least 5* in order for patient to perform sit to stand t/f Mod I    Outcome: Ongoing, Progressing   Initial PT evaluation performed.  Pt could benefit from skilled PT services 6x/wk in order to maximize function prior to D/C.    Inpatient rehab recommended as patient Requires Max A to perform sit to stand from chair and is only able to ambulate approx 6' with RW and Min A.  Pt is far from Prior level of function and has 10 steps to ascend/descend to bedroom and bathroom at home.

## 2020-09-04 NOTE — PLAN OF CARE
Problem: Fall Injury Risk  Goal: Absence of Fall and Fall-Related Injury  Outcome: Met     Problem: Adult Inpatient Plan of Care  Goal: Plan of Care Review  Outcome: Met  Goal: Patient-Specific Goal (Individualization)  Outcome: Met  Goal: Absence of Hospital-Acquired Illness or Injury  Outcome: Met  Goal: Optimal Comfort and Wellbeing  Outcome: Met  Goal: Readiness for Transition of Care  Outcome: Met  Goal: Rounds/Family Conference  Outcome: Met     Problem: Adjustment to Illness (Gastrointestinal Bleeding)  Goal: Optimal Coping with Acute Illness  Outcome: Met     Problem: Bleeding (Gastrointestinal Bleeding)  Goal: Hemostasis  Outcome: Met     Problem: Skin Injury Risk Increased  Goal: Skin Health and Integrity  Outcome: Met     Problem: Wound  Goal: Optimal Wound Healing  Outcome: Met

## 2020-09-04 NOTE — CONSULTS
.[facility]  Gastroenterology  Consult Note    Patient Name: Alicia Chu  MRN: [id number]  Admission Date: 9/3/2020  Hospital Length of Stay: 1 days  Code Status: Prior   Primary Care Physician: Primary Doctor No  Principal Problem:Hypophosphatemia    Inpatient consult to Gastroenterology  Consult performed by: Nhi Oneal PA-C  Consult ordered by: Ailyn Mccarthy MD        Subjective:     Chief complaint: Rectal bleeding     HPI: The patient is a 59 year old female with a history of HTN and depression presenting with rectal bleeding.  She reports acute onset, mild, bright red blood per rectum (predominantly on toilet tissue) with her BM two days ago.  No bleeding today.  No abdominal pain or vomiting.  No history of similar symptoms.  She typically has regular bowel movements but does report some mild diarrhea recently.  She was admitted to [facility] one month ago for upper GI bleeding.  EGD was performed and revealed a non-bleeding gastric ulcer at the G-J anastomosis.  Her most recent colonoscopy was in 2014.  She does not take any blood thinners.     Past medical history:  History of hypertension.   Depression.  Peptic ulcer disease (anastomotic ulcer).      Past surgical history:  Gastric bypass, 2006.  Right knee arthroplasty.   Cholecystectomy.   Hernia repair.     Social history:  Tobacco use: denies.   Alcohol use: admits to heavy EtOH use.   Illicit drug use: denies.     Family history:  Her mother had colorectal cancer.     Medications:  Medications Prior to Admission   Medication Sig Dispense Refill Last Dose    buPROPion (WELLBUTRIN) 100 MG tablet Take 1 tablet (100 mg total) by mouth once daily. 30 tablet 0     FLUoxetine 20 MG capsule Take 3 capsules (60 mg total) by mouth once daily. Also taking Fluoxetine 40 mg QAM 90 capsule 0     folic acid (FOLVITE) 1 MG tablet Take 1 tablet (1 mg total) by mouth once daily.  0     hydrOXYzine pamoate (VISTARIL) 25 MG Cap         multivitamin Tab Take 1 tablet by mouth once daily.       naltrexone (DEPADE) 50 mg tablet Take 1 tablet (50 mg total) by mouth once daily. 30 tablet 0     pantoprazole (PROTONIX) 40 MG tablet Take 1 tablet (40 mg total) by mouth 2 (two) times daily. 120 tablet 0     sucralfate (CARAFATE) 100 mg/mL suspension Take 10 mLs (1 g total) by mouth 4 (four) times daily before meals and nightly. 1200 mL 1     thiamine 100 MG tablet Take 1 tablet (100 mg total) by mouth once daily.       trazodone HCl (TRAZODONE ORAL)         Allergies:  Propanolol, NSAIDs.    Review of systems:  CONSTITUTIONAL: Negative for fever, chills, weakness, weight loss, weight gain.  HEENT: Negative for blurred vision, hearing loss, nasal congestion, dry mouth, sore throat.  CARDIOVASCULAR: Negative for chest pain or palpitations.  RESPIRATORY: Negative for SOB or cough.  GASTROINTESTINAL: See HPI  GENITOURINARY: Negative for dysuria or hematuria.  MUSCULOSKELETAL: Negative for osteoarthritis or muscle pain.  SKIN: Negative for rashes/lesions.  NEUROLOGIC: Negative for headaches, numbness/tingling.  ENDOCRINE: Negative for diabetes or thyroid abnormalities.  HEMATOLOGIC: Negative for anemia or blood dyscrasias.    Objective:     Vital Signs (Most Recent):  Temp: 97.6 °F (36.4 °C) (09/04/20 1110)  Pulse: 91 (09/04/20 1110)  Resp: 16 (09/04/20 1110)  BP: 103/62 (09/04/20 1110)  SpO2: 98 % (09/04/20 1110) Vital Signs (24h Range):  Temp:  [97.5 °F (36.4 °C)-98.5 °F (36.9 °C)] 97.6 °F (36.4 °C)  Pulse:  [75-99] 91  Resp:  [16-21] 16  SpO2:  [90 %-100 %] 98 %  BP: ()/(53-93) 103/62     Physical examination:  General: well developed, well nourished, no apparent distress  HENT: NCAT, atraumatic, hearing grossly intact, no visible or palpable thyroid mass  Eyes: PERRL, EOMI, anicteric sclera  Cardiovascular: Regular rate and rhythm. No peripheral edema.   Lungs: Non-labored respirations. Breath sounds equal.   Abdomen: soft, NTND, normoactive  BS  Extremities: No C/C, 2+ dorsalis pedis pulses bilaterally  Neuro: AA&O x 3, no asterixes or tremors  Psych: Appropriate mood and affect. No SI.  Skin: No jaundice, rashes or lesions  Musculoskeletal: 5/5 strength bilaterally    Recent Labs   Lab 09/03/20  1310 09/04/20  0340   WBC 5.33 2.74*   HGB 11.4* 9.4*   HCT 35.5* 28.7*   PLT 75* 59*     Recent Labs   Lab 09/04/20  0340   *   CALCIUM 7.8*   ALBUMIN 2.2*   PROT 4.8*   *   K 2.7*   CO2 27   CL 96   BUN 10   CREATININE 0.7   ALKPHOS 180*   ALT 22   AST 32   BILITOT 0.9       Imaging:  X-ray abdomen (9/3/2020):  Impression:  1. Scattered upper limit of normal caliber air-filled small bowel loops.  Finding is nonspecific, possibly on the basis of constipation or other generalized slow flow.  Correlation is recommended.  Further evaluation as warranted.      Assessment:   59 year old female with a history of HTN and depression presenting with rectal bleeding.  Suspect rajeev-rectal in origin.  BUN low.  VSS.    Plan:   1.  Monitor for further bleeding; trend H/H.  2.  Outpatient c-scope in the near future; office visit next week.   3.  Case discussed with Renea Quiroz.  Please call back prn.        Thank you for your consult.     Nhi Oneal PA-C  Gastroenterology  [facility]

## 2020-09-04 NOTE — PLAN OF CARE
Problem: Occupational Therapy Goal  Goal: Occupational Therapy Goal  Description: Goals to be met by: 09/18/20     Patient will increase functional independence with ADLs by performing:    UE Dressing with Set-up Assistance.  LE Dressing with Supervision.  Grooming while standing at sink with Supervision.  Toileting from toilet with Supervision for hygiene and clothing management.   Supine to sit with Supervision.  Step transfer with Modified [place]  Toilet transfer to toilet with Modified [place].  Upper extremity exercise program x15 reps per handout, with independence.    Outcome: Ongoing, Progressing     MIN A toilet t/f; pt presents with decreased functional activity tolerance, decreased independence with all ADLs and all aspects of functional mobility. Pt is her husbands' caretaker. Pt will benefit from inpatient rehab at discharge in order to address functional deficits and regain PLOF.

## 2020-09-04 NOTE — NURSING
Patient is discharged. Telemetry removed. IV removed, tip intact. Discharge instructions given and understood. Printed script given as well. Will assist with getting dressed and offer fresh clothing. Awaiting  for

## 2020-09-04 NOTE — NURSING
Report received from off going nurse, RIGOBERTO Palma. Patient AAO. No signs of distress noted. IVF infusing. Call light in reach. Bed low and locked. Will continue to monitor.

## 2020-09-04 NOTE — PLAN OF CARE
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       No future appointments.  Follow-up Information     [facility].    Why: please call at time of discharge to schedule post hospital discharge follow up and establish care for future medical needs  Contact information:  [address] [place] [address]  [phone]                     I have seen and examined this patient face to face today. My clinical findings that support the need for the home health skilled services and home bound status are the following:  Weakness/numbness causing balance and gait disturbance due to Weakness/Debility making it taxing to leave home.    Allergies:  Review of patient's allergies indicates:   Allergen Reactions    Aspirin      Due to gastric bypass    Ibuprofen      Other reaction(s): Due To Gastric Bypass  Due to gastric bypass    Inderal [propranolol]      "Felt bizarre"       Diet: cardiac diet    Activities: activity as tolerated    Nursing:   SN to complete comprehensive assessment including routine vital signs. Instruct on disease process and s/s of complications to report to MD. Review/verify medication list sent " home with the patient at time of discharge  and instruct patient/caregiver as needed. Frequency may be adjusted depending on start of care date.    Notify MD if SBP > 160 or < 90; DBP > 90 or < 50; HR > 120 or < 50; Temp > 101; Other:         CONSULTS:     to evaluate for community resources/long-range planning.  Aide to provide assistance with personal care, ADLs, and vital signs.    MISCELLANEOUS CARE:  N/A    WOUND CARE ORDERS  n/a      Medications: Review discharge medications with patient and family and provide education.      Current Discharge Medication List      CONTINUE these medications which have NOT CHANGED    Details   buPROPion (WELLBUTRIN) 100 MG tablet Take 1 tablet (100 mg total) by mouth once daily.  Qty: 30 tablet, Refills: 0      FLUoxetine 20 MG capsule Take 3 capsules (60 mg total) by mouth once daily. Also taking Fluoxetine 40 mg QAM  Qty: 90 capsule, Refills: 0    Associated Diagnoses: Episode of recurrent major depressive disorder, unspecified depression episode severity      folic acid (FOLVITE) 1 MG tablet Take 1 tablet (1 mg total) by mouth once daily.  Refills: 0      hydrOXYzine pamoate (VISTARIL) 25 MG Cap       multivitamin Tab Take 1 tablet by mouth once daily.  Qty:        naltrexone (DEPADE) 50 mg tablet Take 1 tablet (50 mg total) by mouth once daily.  Qty: 30 tablet, Refills: 0      pantoprazole (PROTONIX) 40 MG tablet Take 1 tablet (40 mg total) by mouth 2 (two) times daily.  Qty: 120 tablet, Refills: 0    Associated Diagnoses: Chronic gastric ulcer, unspecified whether gastric ulcer hemorrhage or perforation present      sucralfate (CARAFATE) 100 mg/mL suspension Take 10 mLs (1 g total) by mouth 4 (four) times daily before meals and nightly.  Qty: 1200 mL, Refills: 1      thiamine 100 MG tablet Take 1 tablet (100 mg total) by mouth once daily.  Qty:        trazodone HCl (TRAZODONE ORAL)              I certify that this patient is confined to her home and needs  intermittent skilled nursing care.

## 2020-09-04 NOTE — PROGRESS NOTES
HH orders uploaded to [facility] and sent to 18 different agencies to attempt to get an accepting agency. [place] to follow in [facility] for response. [place] indicated that the plan is for the pt to d/c to home on today.    1435- pt as of this time has been declined by 6 of the 18 agencies referral has been sent to

## 2020-09-04 NOTE — PROGRESS NOTES
Met with pt and NP at bedside to discuss recommendations given from PT/OT for inpatient rehab. At this time pt states that she does not wish to go to an IRF at time of discharge and would like to go home with OP PT/OT services.

## 2020-09-04 NOTE — PLAN OF CARE
09/04/20 1342   Final Note   Assessment Type Final Discharge Note   Anticipated Discharge Disposition Home   Hospital Follow Up  Appt(s) scheduled? Yes   Discharge plans and expectations educations in teach back method with documentation complete? Yes   Right Care Referral Info   Post Acute Recommendation No Care   Post-Acute Status   Post-Acute Authorization Other   Other Status No Post-Acute Service Needs   Pt nurse Lucy notified that the pt can d/c from CM standpoint.
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
## 2020-09-05 NOTE — PT/OT/SLP DISCHARGE
Physical Therapy Discharge Summary    Name: Alicia Chu  MRN: [id number]   Principal Problem: Hypophosphatemia     Patient Discharged from acute Physical Therapy on 20.  Please refer to prior PT noted date on 20 for functional status.     Assessment:     Patient was discharged unexpectedly.  Information required to complete an accurate discharge summary is unknown.  Refer to therapy initial evaluation and last progress note for initial and most recent functional status and goal achievement.  Recommendations made may be found in medical record.    Objective:     GOALS:   Multidisciplinary Problems     Physical Therapy Goals        Problem: Physical Therapy Goal    Goal Priority Disciplines Outcome Goal Variances Interventions   Physical Therapy Goal     PT, PT/OT Ongoing, Progressing     Description: Goals to be met by: 20     Patient will increase functional independence with mobility by performin. Supine to sit with Modified [place]  2. Sit to stand transfer with Modified [place]  3. Gait  x 150 feet with Modified [place] using Rolling Walker.   4. Ascend/descend 10 stair with right Handrails Modified [place]    5. Lower extremity exercise program 2x10 reps per handout, with independence  6.  Increase L ankle Dflx PROM to at least 5* in order for patient to perform sit to stand t/f Mod I                     Reasons for Discontinuation of Therapy Services  Transfer to alternate level of care.      Plan:     Patient Discharged to: IPR was PT recommendation, but patient declined..    Audra Salas, PT  2020

## 2020-09-05 NOTE — PT/OT/SLP DISCHARGE
Occupational Therapy Discharge Summary    Alicia Chu  MRN: [id number]   Principal Problem: Hypophosphatemia      Patient Discharged from acute Occupational Therapy on 09/04/20.  Please refer to prior OT notes for functional status.    Assessment:      Patient appropriate for care in another setting.    Objective:     GOALS:   Multidisciplinary Problems     Occupational Therapy Goals        Problem: Occupational Therapy Goal    Goal Priority Disciplines Outcome Interventions   Occupational Therapy Goal     OT, PT/OT Ongoing, Progressing    Description: Goals to be met by: 09/18/20     Patient will increase functional independence with ADLs by performing:    UE Dressing with Set-up Assistance.  LE Dressing with Supervision.  Grooming while standing at sink with Supervision.  Toileting from toilet with Supervision for hygiene and clothing management.   Supine to sit with Supervision.  Step transfer with Modified [place]  Toilet transfer to toilet with Modified [place].  Upper extremity exercise program x15 reps per handout, with independence.                     Reasons for Discontinuation of Therapy Services  Transfer to alternate level of care.      Plan:     Patient Discharged to: OT rec IPR; pt d/c home     Angelina Youngblood OT  9/5/2020

## 2020-09-06 LAB — BACTERIA UR CULT: NORMAL
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
## 2020-09-23 ENCOUNTER — HOSPITAL ENCOUNTER (EMERGENCY)
Facility: HOSPITAL | Age: 60
Discharge: HOME | End: 2020-09-23
Attending: EMERGENCY MEDICINE
Payer: MEDICAID

## 2020-09-23 VITALS
SYSTOLIC BLOOD PRESSURE: 112 MMHG | WEIGHT: 159 LBS | HEIGHT: 65 IN | RESPIRATION RATE: 18 BRPM | OXYGEN SATURATION: 98 % | DIASTOLIC BLOOD PRESSURE: 70 MMHG | BODY MASS INDEX: 26.49 KG/M2 | HEART RATE: 95 BPM | TEMPERATURE: 98 F

## 2020-09-23 DIAGNOSIS — N39.0 URINARY TRACT INFECTION WITHOUT HEMATURIA, SITE UNSPECIFIED: ICD-10-CM

## 2020-09-23 DIAGNOSIS — F10.120 ALCOHOL ABUSE WITH UNCOMPLICATED INTOXICATION: Primary | ICD-10-CM

## 2020-09-23 LAB
ALBUMIN SERPL BCP-MCNC: 2.7 G/DL (ref 3.5–5.2)
ALP SERPL-CCNC: 167 U/L (ref 55–135)
ALT SERPL W/O P-5'-P-CCNC: 53 U/L (ref 10–44)
AMPHET+METHAMPHET UR QL: NEGATIVE
ANION GAP SERPL CALC-SCNC: 23 MMOL/L (ref 8–16)
APAP SERPL-MCNC: <3 UG/ML (ref 10–20)
AST SERPL-CCNC: 228 U/L (ref 10–40)
BACTERIA #/AREA URNS HPF: ABNORMAL /HPF
BARBITURATES UR QL SCN>200 NG/ML: NEGATIVE
BASOPHILS # BLD AUTO: 0.09 K/UL (ref 0–0.2)
BASOPHILS NFR BLD: 1.5 % (ref 0–1.9)
BENZODIAZ UR QL SCN>200 NG/ML: NEGATIVE
BILIRUB SERPL-MCNC: 1 MG/DL (ref 0.1–1)
BILIRUB UR QL STRIP: NEGATIVE
BUN SERPL-MCNC: 7 MG/DL (ref 6–20)
BZE UR QL SCN: NEGATIVE
CALCIUM SERPL-MCNC: 7.4 MG/DL (ref 8.7–10.5)
CANNABINOIDS UR QL SCN: NEGATIVE
CHLORIDE SERPL-SCNC: 101 MMOL/L (ref 95–110)
CLARITY UR: CLEAR
CO2 SERPL-SCNC: 18 MMOL/L (ref 23–29)
COLOR UR: YELLOW
CREAT SERPL-MCNC: 0.7 MG/DL (ref 0.5–1.4)
CREAT UR-MCNC: 48.5 MG/DL (ref 15–325)
CTP QC/QA: YES
DIFFERENTIAL METHOD: ABNORMAL
EOSINOPHIL # BLD AUTO: 0 K/UL (ref 0–0.5)
EOSINOPHIL NFR BLD: 0.2 % (ref 0–8)
ERYTHROCYTE [DISTWIDTH] IN BLOOD BY AUTOMATED COUNT: 18.6 % (ref 11.5–14.5)
EST. GFR  (AFRICAN AMERICAN): >60 ML/MIN/1.73 M^2
EST. GFR  (NON AFRICAN AMERICAN): >60 ML/MIN/1.73 M^2
ETHANOL SERPL-MCNC: 361 MG/DL
GLUCOSE SERPL-MCNC: 63 MG/DL (ref 70–110)
GLUCOSE UR QL STRIP: NEGATIVE
HCT VFR BLD AUTO: 33.4 % (ref 37–48.5)
HGB BLD-MCNC: 10.9 G/DL (ref 12–16)
HGB UR QL STRIP: ABNORMAL
IMM GRANULOCYTES # BLD AUTO: 0.04 K/UL (ref 0–0.04)
IMM GRANULOCYTES NFR BLD AUTO: 0.7 % (ref 0–0.5)
KETONES UR QL STRIP: ABNORMAL
LEUKOCYTE ESTERASE UR QL STRIP: ABNORMAL
LYMPHOCYTES # BLD AUTO: 1.6 K/UL (ref 1–4.8)
LYMPHOCYTES NFR BLD: 27.2 % (ref 18–48)
MCH RBC QN AUTO: 29.6 PG (ref 27–31)
MCHC RBC AUTO-ENTMCNC: 32.6 G/DL (ref 32–36)
MCV RBC AUTO: 91 FL (ref 82–98)
METHADONE UR QL SCN>300 NG/ML: NEGATIVE
MICROSCOPIC COMMENT: ABNORMAL
MONOCYTES # BLD AUTO: 0.3 K/UL (ref 0.3–1)
MONOCYTES NFR BLD: 5.6 % (ref 4–15)
NEUTROPHILS # BLD AUTO: 3.8 K/UL (ref 1.8–7.7)
NEUTROPHILS NFR BLD: 64.8 % (ref 38–73)
NITRITE UR QL STRIP: POSITIVE
NRBC BLD-RTO: 0 /100 WBC
OPIATES UR QL SCN: NEGATIVE
PCP UR QL SCN>25 NG/ML: NEGATIVE
PH UR STRIP: 6 [PH] (ref 5–8)
PLATELET # BLD AUTO: 213 K/UL (ref 150–350)
PMV BLD AUTO: 11 FL (ref 9.2–12.9)
POTASSIUM SERPL-SCNC: 3.4 MMOL/L (ref 3.5–5.1)
PROT SERPL-MCNC: 5.2 G/DL (ref 6–8.4)
PROT UR QL STRIP: NEGATIVE
RBC # BLD AUTO: 3.68 M/UL (ref 4–5.4)
RBC #/AREA URNS HPF: 5 /HPF (ref 0–4)
SARS-COV-2 RDRP RESP QL NAA+PROBE: NEGATIVE
SODIUM SERPL-SCNC: 142 MMOL/L (ref 136–145)
SP GR UR STRIP: 1.01 (ref 1–1.03)
SQUAMOUS #/AREA URNS HPF: 2 /HPF
TOXICOLOGY INFORMATION: NORMAL
TSH SERPL DL<=0.005 MIU/L-ACNC: 2.68 UIU/ML (ref 0.4–4)
URN SPEC COLLECT METH UR: ABNORMAL
UROBILINOGEN UR STRIP-ACNC: ABNORMAL EU/DL
WBC # BLD AUTO: 5.92 K/UL (ref 3.9–12.7)
WBC #/AREA URNS HPF: 4 /HPF (ref 0–5)

## 2020-09-23 PROCEDURE — 96367 TX/PROPH/DG ADDL SEQ IV INF: CPT

## 2020-09-23 PROCEDURE — 99284 EMERGENCY DEPT VISIT MOD MDM: CPT | Mod: 25

## 2020-09-23 PROCEDURE — 63600175 PHARM REV CODE 636 W HCPCS: Performed by: EMERGENCY MEDICINE

## 2020-09-23 PROCEDURE — 96366 THER/PROPH/DIAG IV INF ADDON: CPT

## 2020-09-23 PROCEDURE — 80329 ANALGESICS NON-OPIOID 1 OR 2: CPT

## 2020-09-23 PROCEDURE — 80307 DRUG TEST PRSMV CHEM ANLYZR: CPT

## 2020-09-23 PROCEDURE — 85025 COMPLETE CBC W/AUTO DIFF WBC: CPT

## 2020-09-23 PROCEDURE — 80053 COMPREHEN METABOLIC PANEL: CPT

## 2020-09-23 PROCEDURE — 84443 ASSAY THYROID STIM HORMONE: CPT

## 2020-09-23 PROCEDURE — 96365 THER/PROPH/DIAG IV INF INIT: CPT

## 2020-09-23 PROCEDURE — U0002 COVID-19 LAB TEST NON-CDC: HCPCS | Performed by: EMERGENCY MEDICINE

## 2020-09-23 PROCEDURE — 81000 URINALYSIS NONAUTO W/SCOPE: CPT | Mod: 59

## 2020-09-23 PROCEDURE — 25000003 PHARM REV CODE 250: Performed by: EMERGENCY MEDICINE

## 2020-09-23 PROCEDURE — 80320 DRUG SCREEN QUANTALCOHOLS: CPT

## 2020-09-23 RX ORDER — CHLORDIAZEPOXIDE HYDROCHLORIDE 25 MG/1
50 CAPSULE, GELATIN COATED ORAL
Status: COMPLETED | OUTPATIENT
Start: 2020-09-23 | End: 2020-09-23

## 2020-09-23 RX ORDER — LORAZEPAM 0.5 MG/1
1 TABLET ORAL
Status: COMPLETED | OUTPATIENT
Start: 2020-09-23 | End: 2020-09-23

## 2020-09-23 RX ORDER — DEXTROSE MONOHYDRATE, SODIUM CHLORIDE, AND POTASSIUM CHLORIDE 50; 1.49; 9 G/1000ML; G/1000ML; G/1000ML
1000 INJECTION, SOLUTION INTRAVENOUS
Status: COMPLETED | OUTPATIENT
Start: 2020-09-23 | End: 2020-09-23

## 2020-09-23 RX ORDER — CHLORDIAZEPOXIDE HYDROCHLORIDE 25 MG/1
CAPSULE, GELATIN COATED ORAL
Qty: 15 CAPSULE | Refills: 0 | Status: ON HOLD | OUTPATIENT
Start: 2020-09-23 | End: 2020-11-18 | Stop reason: HOSPADM

## 2020-09-23 RX ORDER — CEPHALEXIN 250 MG/1
500 CAPSULE ORAL
Status: COMPLETED | OUTPATIENT
Start: 2020-09-23 | End: 2020-09-23

## 2020-09-23 RX ORDER — CEPHALEXIN 500 MG/1
500 CAPSULE ORAL EVERY 12 HOURS
Qty: 14 CAPSULE | Refills: 0 | Status: SHIPPED | OUTPATIENT
Start: 2020-09-23 | End: 2020-09-30

## 2020-09-23 RX ADMIN — CEPHALEXIN 500 MG: 250 CAPSULE ORAL at 06:09

## 2020-09-23 RX ADMIN — CHLORDIAZEPOXIDE HYDROCHLORIDE 50 MG: 25 CAPSULE ORAL at 03:09

## 2020-09-23 RX ADMIN — LORAZEPAM 1 MG: 0.5 TABLET ORAL at 05:09

## 2020-09-23 RX ADMIN — FOLIC ACID: 5 INJECTION, SOLUTION INTRAMUSCULAR; INTRAVENOUS; SUBCUTANEOUS at 03:09

## 2020-09-23 RX ADMIN — DEXTROSE MONOHYDRATE, SODIUM CHLORIDE, AND POTASSIUM CHLORIDE 1000 ML: 50; 9; 1.49 INJECTION, SOLUTION INTRAVENOUS at 04:09

## 2020-09-23 NOTE — DISCHARGE INSTRUCTIONS
Your lab tests are within acceptable ranges and you have no medical contraindication to inpatient alcohol treatment or detox.  You have a urinary tract infection.  Complete the entire course of antibiotics prescribed for your urine infection.  You have been prescribed a Librium taper to help with alcohol withdrawal symptoms.  Visit [facility] for outpatient alcohol use.  Follow-up with your primary physician.  Return to the emergency department for any new, worsening or significantly concerning symptoms.    Thank you for coming to our Emergency Department today. It is important to remember that some problems are difficult to diagnose and may not be found during your first visit. Be sure to follow up with your primary care doctor and review any labs/imaging that was performed with them. If you do not have a primary care doctor, you may contact the one listed on your discharge paperwork or you may also call the [facility] Appointment Desk at [phone] to schedule an appointment with one.     All medications may potentially have side effects and it is impossible to predict which medications may give you side effects. If you feel that you are having a negative effect of any medication you should immediately stop taking them and seek medical attention.    Return to the ER with any questions/concerns, new/concerning symptoms, worsening or failure to improve. Do not drive or make any important decisions for 24 hours if you have received any pain medications, sedatives or mood altering drugs during your ER visit.
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
## 2020-09-23 NOTE — ED NOTES
Another meal tray came for pt. Assisted pt into a sitting position and she states she will attempt to eat.

## 2020-09-23 NOTE — ED PROVIDER NOTES
"Encounter Date: 9/23/2020    SCRIBE #1 NOTE: I, Nhi Curtis, am scribing for, and in the presence of,  Michele Saxena MD. I have scribed the following portions of the note - Other sections scribed: HPI, ROS, PE, Differential.       History     Chief Complaint   Patient presents with    Alcohol Intoxication     pt BIB [facility] for chronic alcoholism. Pt reports drinking 2 large bottles of vodka this morning. Pt states she wants to quit and wants a detox program      CC: Alcohol Intoxication    HPI: This 60 y.o. female, with a medical history of addiction to drug, alcohol abuse, gastrointestinal bleeding, hypertension, and psychiatric problem, presents to the ED via EMS transportation for alcohol intoxication. Pt reports that she has been consuming alcohol continuously for the last few days. She states that she really wants to feel whole and stop drinking alcohol. Pt notes that she has been feeling depressed and hopeless, and adds that she is sleeping and eating poorly. Additionally, pt reports living with her , who gets angry with her. She notes that she does not feel safe at home. Pt denies suicidal ideation, homicidal ideation or hallucinations. She also denies nausea, emesis or diarrhea. No recent falls or injuries. No other associated symptoms.    The history is provided by the patient.     Review of patient's allergies indicates:   Allergen Reactions    Aspirin      Due to gastric bypass    Ibuprofen      Other reaction(s): Due To Gastric Bypass  Due to gastric bypass    Inderal [propranolol]      "Felt bizarre"     Past Medical History:   Diagnosis Date    Addiction to drug     Alcohol abuse     Colon polyps     Gastric bypass status for obesity 2006    GIB (gastrointestinal bleeding)     History of psychiatric hospitalization     Hx of psychiatric care     Hypertension     Postmenopausal HRT (hormone replacement therapy)     Psychiatric problem     Therapy     Ulcer      Past " Surgical History:   Procedure Laterality Date    ABDOMINAL SURGERY      BREAST BIOPSY  2005    right    CHOLECYSTECTOMY      ESOPHAGOGASTRODUODENOSCOPY N/A 4/18/2019    Procedure: EGD (ESOPHAGOGASTRODUODENOSCOPY);  Surgeon: Mony Plunkett MD;  Location: [facility] ENDO;  Service: Endoscopy;  Laterality: N/A;    ESOPHAGOGASTRODUODENOSCOPY N/A 8/4/2020    Procedure: EGD (ESOPHAGOGASTRODUODENOSCOPY);  Surgeon: Jose F Oreilly MD;  Location: [facility] ENDO;  Service: Endoscopy;  Laterality: N/A;    GASTRIC BYPASS  2006    zackary en Y    HERNIA REPAIR  x2    LI hernia    HERNIA REPAIR      INFECTED SKIN DEBRIDEMENT  x3    KNEE ARTHROSCOPY W/ DEBRIDEMENT      right     Family History   Problem Relation Age of Onset    Cancer Mother         colon    Dementia Father     Hypertension Father     Hyperlipidemia Father     Alzheimer's disease Father 78    Cancer Paternal Grandfather         colon cancer    Breast cancer Maternal Uncle      Social History     Tobacco Use    Smoking status: Never Smoker    Smokeless tobacco: Never Used   Substance Use Topics    Alcohol use: Yes     Alcohol/week: 0.0 standard drinks     Comment: 1 small bottle wine daily (10oz) + 4 strong vodka cocktails daily    Drug use: No     Review of Systems   Constitutional: Negative for chills and fever.        (+) alcohol intoxication; (+) poor sleeping and eating   HENT: Negative for congestion, rhinorrhea and sore throat.    Eyes: Negative for visual disturbance.   Respiratory: Negative for shortness of breath.    Cardiovascular: Negative for chest pain.   Gastrointestinal: Negative for abdominal pain, anal bleeding, blood in stool, diarrhea, nausea and vomiting.   Genitourinary: Negative for dysuria.   Musculoskeletal: Negative for back pain.   Skin: Negative for rash.   Neurological: Negative for dizziness, weakness and headaches.   Psychiatric/Behavioral: Positive for dysphoric mood (depressed and hopeless). Negative for hallucinations and  suicidal ideas.       Physical Exam     Initial Vitals [09/23/20 1356]   BP Pulse Resp Temp SpO2   110/80 92 18 98.6 °F (37 °C) 98 %      MAP       --         Physical Exam    Nursing note and vitals reviewed.  Constitutional: She is not diaphoretic. No distress.   Patient is unkempt.   HENT:   Head: Normocephalic and atraumatic.   Mouth/Throat: Oropharynx is clear and moist.   Eyes: EOM are normal. Pupils are equal, round, and reactive to light. No scleral icterus.   Neck: Normal range of motion. Neck supple. No JVD present.   Cardiovascular: Normal rate, regular rhythm and intact distal pulses.   Pulmonary/Chest: Breath sounds normal. No stridor. No respiratory distress.   Abdominal: Soft. Bowel sounds are normal. She exhibits no distension. There is no abdominal tenderness.   Musculoskeletal: Normal range of motion. No tenderness or edema.   Neurological: She is alert. She has normal strength. No cranial nerve deficit or sensory deficit. GCS score is 15. GCS eye subscore is 4. GCS verbal subscore is 5. GCS motor subscore is 6.   Skin: Skin is warm and dry.   Psychiatric: She has a normal mood and affect.         ED Course   Procedures  Labs Reviewed   CBC W/ AUTO DIFFERENTIAL - Abnormal; Notable for the following components:       Result Value    RBC 3.68 (*)     Hemoglobin 10.9 (*)     Hematocrit 33.4 (*)     RDW 18.6 (*)     Immature Granulocytes 0.7 (*)     All other components within normal limits   COMPREHENSIVE METABOLIC PANEL - Abnormal; Notable for the following components:    Potassium 3.4 (*)     CO2 18 (*)     Glucose 63 (*)     Calcium 7.4 (*)     Total Protein 5.2 (*)     Albumin 2.7 (*)     Alkaline Phosphatase 167 (*)      (*)     ALT 53 (*)     Anion Gap 23 (*)     All other components within normal limits   URINALYSIS, REFLEX TO URINE CULTURE - Abnormal; Notable for the following components:    Ketones, UA 1+ (*)     Occult Blood UA 2+ (*)     Nitrite, UA Positive (*)     Urobilinogen, UA  2.0-3.0 (*)     Leukocytes, UA 1+ (*)     All other components within normal limits    Narrative:     Specimen Source->Urine   ALCOHOL,MEDICAL (ETHANOL) - Abnormal; Notable for the following components:    Alcohol, Medical, Serum 361 (*)     All other components within normal limits    Narrative:     Alc   critical result(s) called and verbal readback obtained from   Leanne Kali by FADI 09/23/2020 16:06   ACETAMINOPHEN LEVEL - Abnormal; Notable for the following components:    Acetaminophen (Tylenol), Serum <3.0 (*)     All other components within normal limits   URINALYSIS MICROSCOPIC - Abnormal; Notable for the following components:    RBC, UA 5 (*)     Bacteria Many (*)     All other components within normal limits    Narrative:     Specimen Source->Urine   TSH   DRUG SCREEN PANEL, URINE EMERGENCY    Narrative:     Specimen Source->Urine   SARS-COV-2 RDRP GENE          Imaging Results    None          Medical Decision Making:   History:   Old Medical Records: I decided to obtain old medical records.  Old Records Summarized: other records.       <> Summary of Records: Patient was admitted into the hospital on 9/3/2020 for rectal bleeding.  Differential Diagnosis:   Differential diagnosis includes alcoholism, alcohol intoxication and alcohol induced mood disorder.            Scribe Attestation:   Scribe #1: I performed the above scribed service and the documentation accurately describes the services I performed. I attest to the accuracy of the note.            ED Course as of Sep 24 0639   Wed Sep 23, 2020   1755 Patient is afebrileAnd in no acute distress time history and physical.    [SG]   1759 Patient is afebrile and in no acute distress at time history and physical.  She is not tremulous, tachycardic and has no tongue fasciculations.  She has a benign abdominal exam.  She has a GCS of 15.  She denies current suicidal ideation or homicidal ideation or hallucinations.  Patient does not appear to be danger to  herself or others.  Labs within acceptable ranges without leukocytosis or significant electrolyte abnormality.  Patient has some transaminitis likely related to alcohol abuse.  UA suggestive of UTI.  Patient given banana bag and benzodiazepines to prevent alcohol withdrawal.  Patient evaluated by social Work and provided with resources for shelter an outpatient alcohol treatment.  She has remained clinically stable in the emergency department.  On reassessment patient is resting comfortably in stretcher.  She reports feeling well.  She reports that she feels comfortable going to her neighbor's house and reports that she feels safe there. She states that she believes that she can attend outpatient alcohol treatment programs.  Patient given prescription for Keflex for UTI.  Patient also given prescription for Librium taper. counseled on supportive care, appropriate medication usage, concerning symptoms for which to return to ER and the importance of follow up. Understanding and agreement with treatment plan was expressed.     [SG]      ED Course User Index  [SG] Michele Saxena MD     This chart was completed using dictation software, as a result there may be some transcription errors.        Clinical Impression:     ICD-10-CM ICD-9-CM   1. Alcohol abuse with uncomplicated intoxication  F10.120 305.00   2. Urinary tract infection without hematuria, site unspecified  N39.0 599.0                      Disposition:   Disposition: Discharged  Condition: Stable     ED Disposition Condition    Discharge Stable        ED Prescriptions     Medication Sig Dispense Start Date End Date Auth. Provider    cephALEXin (KEFLEX) 500 MG capsule Take 1 capsule (500 mg total) by mouth every 12 (twelve) hours. for 7 days 14 capsule 9/23/2020 9/30/2020 Michele Saxena MD    chlordiazepoxide (LIBRIUM) 25 MG Cap Take 2 capsules (50 mg total) by mouth 4 (four) times daily for 1 day, THEN 1 capsule (25 mg total) 4 (four) times daily for 1  day, THEN 1 capsule (25 mg total) every 12 (twelve) hours for 1 day, THEN 1 capsule (25 mg total) nightly for 1 day. 15 capsule 9/23/2020 9/27/2020 Michele Saxena MD        Follow-up Information     Follow up With Specialties Details Why Contact Info    [facility], Psychiatry, Psychology Schedule an appointment as soon as possible for a visit   [address] [id number] [phone]      Drea Martinez MD Family Medicine, Wound Care Schedule an appointment as soon as possible for a visit   [address] [id number] [phone]                          I, Michele Saxena , personally performed the services described in this documentation. All medical record entries made by the scribe were at my direction and in my presence. I have reviewed the chart and agree that the record reflects my personal performance and is accurate and complete.               Michele Saxena MD  09/24/20 0676

## 2020-09-23 NOTE — ED TRIAGE NOTES
"The patient presents to the ED via  ems. Per ems, the patient called because she is a chronic alcoholic and wants to detox. Patient's last drink was noon today. Denies chest pain or sob. Reports feeling depressed, loss of appetite, trouble sleeping.  Patient was taking injections for Alcoholism and was doing fine on it but then during the Covid pandemic she was unable to get the injections and started taking the pills instead which did not not work, leading to her relapse. Patient reports feeling unsafe because of her , stating "he is an angry man and wants me out of the house but I have no place to go". Patient also reports left foot pain x 2 weeks but states that she was seen in this ED for and no diagnosis was made.   "

## 2020-09-23 NOTE — ED NOTES
Pt given a specimen cup and instructed on need for urine sample. States unable to go at this time. Will follow up.

## 2020-09-23 NOTE — ED NOTES
Contacted Mariajose, pt's neighbor, and she states that she will come to  the pt at 8pm when discharged.

## 2020-11-16 ENCOUNTER — HOSPITAL ENCOUNTER (INPATIENT)
Facility: HOSPITAL | Age: 60
LOS: 4 days | Discharge: HOME | DRG: 641 | End: 2020-11-20
Attending: EMERGENCY MEDICINE | Admitting: INTERNAL MEDICINE
Payer: MEDICAID

## 2020-11-16 DIAGNOSIS — R94.31 PROLONGED QT INTERVAL: ICD-10-CM
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
## 2020-11-16 DIAGNOSIS — S42.91XB: Primary | ICD-10-CM

## 2020-11-16 DIAGNOSIS — E87.6 HYPOKALEMIA: ICD-10-CM

## 2020-11-16 DIAGNOSIS — R07.9 CHEST PAIN: ICD-10-CM

## 2020-11-16 DIAGNOSIS — E83.42 HYPOMAGNESEMIA: ICD-10-CM

## 2020-11-16 DIAGNOSIS — R94.31 QT PROLONGATION: ICD-10-CM

## 2020-11-16 LAB
ALBUMIN SERPL BCP-MCNC: 3.1 G/DL (ref 3.5–5.2)
ALP SERPL-CCNC: 216 U/L (ref 55–135)
ALT SERPL W/O P-5'-P-CCNC: 41 U/L (ref 10–44)
ANION GAP SERPL CALC-SCNC: 20 MMOL/L (ref 8–16)
ANION GAP SERPL CALC-SCNC: 26 MMOL/L (ref 8–16)
AST SERPL-CCNC: 88 U/L (ref 10–40)
BASOPHILS # BLD AUTO: 0.05 K/UL (ref 0–0.2)
BASOPHILS NFR BLD: 0.6 % (ref 0–1.9)
BILIRUB SERPL-MCNC: 0.6 MG/DL (ref 0.1–1)
BUN SERPL-MCNC: 6 MG/DL (ref 6–30)
BUN SERPL-MCNC: 7 MG/DL (ref 6–20)
CALCIUM SERPL-MCNC: 8.5 MG/DL (ref 8.7–10.5)
CHLORIDE SERPL-SCNC: 101 MMOL/L (ref 95–110)
CHLORIDE SERPL-SCNC: 99 MMOL/L (ref 95–110)
CO2 SERPL-SCNC: 15 MMOL/L (ref 23–29)
CREAT SERPL-MCNC: 0.8 MG/DL (ref 0.5–1.4)
CREAT SERPL-MCNC: 0.9 MG/DL (ref 0.5–1.4)
CTP QC/QA: YES
DIFFERENTIAL METHOD: ABNORMAL
EOSINOPHIL # BLD AUTO: 0 K/UL (ref 0–0.5)
EOSINOPHIL NFR BLD: 0.1 % (ref 0–8)
ERYTHROCYTE [DISTWIDTH] IN BLOOD BY AUTOMATED COUNT: 16.7 % (ref 11.5–14.5)
EST. GFR  (AFRICAN AMERICAN): >60 ML/MIN/1.73 M^2
EST. GFR  (NON AFRICAN AMERICAN): >60 ML/MIN/1.73 M^2
GLUCOSE SERPL-MCNC: 110 MG/DL (ref 70–110)
GLUCOSE SERPL-MCNC: 112 MG/DL (ref 70–110)
HCT VFR BLD AUTO: 37.7 % (ref 37–48.5)
HCT VFR BLD CALC: 35 %PCV (ref 36–54)
HGB BLD-MCNC: 12.6 G/DL (ref 12–16)
IMM GRANULOCYTES # BLD AUTO: 0.03 K/UL (ref 0–0.04)
IMM GRANULOCYTES NFR BLD AUTO: 0.4 % (ref 0–0.5)
LYMPHOCYTES # BLD AUTO: 1.5 K/UL (ref 1–4.8)
LYMPHOCYTES NFR BLD: 19 % (ref 18–48)
MAGNESIUM SERPL-MCNC: 1.5 MG/DL (ref 1.6–2.6)
MCH RBC QN AUTO: 29.8 PG (ref 27–31)
MCHC RBC AUTO-ENTMCNC: 33.4 G/DL (ref 32–36)
MCV RBC AUTO: 89 FL (ref 82–98)
MONOCYTES # BLD AUTO: 0.5 K/UL (ref 0.3–1)
MONOCYTES NFR BLD: 5.7 % (ref 4–15)
NEUTROPHILS # BLD AUTO: 6 K/UL (ref 1.8–7.7)
NEUTROPHILS NFR BLD: 74.2 % (ref 38–73)
NRBC BLD-RTO: 0 /100 WBC
PLATELET # BLD AUTO: 265 K/UL (ref 150–350)
PMV BLD AUTO: 12 FL (ref 9.2–12.9)
POC IONIZED CALCIUM: 1.07 MMOL/L (ref 1.06–1.42)
POC TCO2 (MEASURED): 18 MMOL/L (ref 23–29)
POTASSIUM BLD-SCNC: 2.8 MMOL/L (ref 3.5–5.1)
POTASSIUM SERPL-SCNC: 3.8 MMOL/L (ref 3.5–5.1)
PROT SERPL-MCNC: 7.1 G/DL (ref 6–8.4)
RBC # BLD AUTO: 4.23 M/UL (ref 4–5.4)
SAMPLE: ABNORMAL
SARS-COV-2 RDRP RESP QL NAA+PROBE: NEGATIVE
SODIUM BLD-SCNC: 138 MMOL/L (ref 136–145)
SODIUM SERPL-SCNC: 136 MMOL/L (ref 136–145)
TROPONIN I SERPL DL<=0.01 NG/ML-MCNC: 0.02 NG/ML (ref 0–0.03)
WBC # BLD AUTO: 8.02 K/UL (ref 3.9–12.7)

## 2020-11-16 PROCEDURE — 93010 ELECTROCARDIOGRAM REPORT: CPT | Mod: ,,, | Performed by: INTERNAL MEDICINE

## 2020-11-16 PROCEDURE — 63600175 PHARM REV CODE 636 W HCPCS: Performed by: EMERGENCY MEDICINE

## 2020-11-16 PROCEDURE — 11000001 HC ACUTE MED/SURG PRIVATE ROOM

## 2020-11-16 PROCEDURE — 93005 ELECTROCARDIOGRAM TRACING: CPT

## 2020-11-16 PROCEDURE — 80048 BASIC METABOLIC PNL TOTAL CA: CPT

## 2020-11-16 PROCEDURE — 93010 EKG 12-LEAD: ICD-10-PCS | Mod: ,,, | Performed by: INTERNAL MEDICINE

## 2020-11-16 PROCEDURE — 83735 ASSAY OF MAGNESIUM: CPT

## 2020-11-16 PROCEDURE — 84295 ASSAY OF SERUM SODIUM: CPT

## 2020-11-16 PROCEDURE — 80053 COMPREHEN METABOLIC PANEL: CPT

## 2020-11-16 PROCEDURE — 84132 ASSAY OF SERUM POTASSIUM: CPT

## 2020-11-16 PROCEDURE — 36415 COLL VENOUS BLD VENIPUNCTURE: CPT

## 2020-11-16 PROCEDURE — 83735 ASSAY OF MAGNESIUM: CPT | Mod: 91

## 2020-11-16 PROCEDURE — 85025 COMPLETE CBC W/AUTO DIFF WBC: CPT

## 2020-11-16 PROCEDURE — 99900035 HC TECH TIME PER 15 MIN (STAT)

## 2020-11-16 PROCEDURE — 82565 ASSAY OF CREATININE: CPT

## 2020-11-16 PROCEDURE — 82330 ASSAY OF CALCIUM: CPT

## 2020-11-16 PROCEDURE — 84484 ASSAY OF TROPONIN QUANT: CPT

## 2020-11-16 PROCEDURE — 85014 HEMATOCRIT: CPT

## 2020-11-16 PROCEDURE — 99291 CRITICAL CARE FIRST HOUR: CPT

## 2020-11-16 PROCEDURE — 25000003 PHARM REV CODE 250: Performed by: EMERGENCY MEDICINE

## 2020-11-16 PROCEDURE — U0002 COVID-19 LAB TEST NON-CDC: HCPCS | Performed by: EMERGENCY MEDICINE

## 2020-11-16 RX ORDER — GLUCAGON 1 MG
1 KIT INJECTION
Status: DISCONTINUED | OUTPATIENT
Start: 2020-11-17 | End: 2020-11-20 | Stop reason: HOSPADM

## 2020-11-16 RX ORDER — MAGNESIUM SULFATE 1 G/100ML
1 INJECTION INTRAVENOUS
Status: COMPLETED | OUTPATIENT
Start: 2020-11-16 | End: 2020-11-16

## 2020-11-16 RX ORDER — FLUOXETINE HYDROCHLORIDE 20 MG/1
60 CAPSULE ORAL DAILY
Status: DISCONTINUED | OUTPATIENT
Start: 2020-11-17 | End: 2020-11-20 | Stop reason: HOSPADM

## 2020-11-16 RX ORDER — AMOXICILLIN 250 MG
1 CAPSULE ORAL 2 TIMES DAILY PRN
Status: DISCONTINUED | OUTPATIENT
Start: 2020-11-17 | End: 2020-11-20 | Stop reason: HOSPADM

## 2020-11-16 RX ORDER — FOLIC ACID 1 MG/1
1 TABLET ORAL DAILY
Status: DISCONTINUED | OUTPATIENT
Start: 2020-11-16 | End: 2020-11-20 | Stop reason: HOSPADM

## 2020-11-16 RX ORDER — TRAZODONE HYDROCHLORIDE 50 MG/1
50 TABLET ORAL NIGHTLY
Status: DISCONTINUED | OUTPATIENT
Start: 2020-11-17 | End: 2020-11-20 | Stop reason: HOSPADM

## 2020-11-16 RX ORDER — IBUPROFEN 200 MG
16 TABLET ORAL
Status: DISCONTINUED | OUTPATIENT
Start: 2020-11-17 | End: 2020-11-20 | Stop reason: HOSPADM

## 2020-11-16 RX ORDER — LANOLIN ALCOHOL/MO/W.PET/CERES
100 CREAM (GRAM) TOPICAL DAILY
Status: DISCONTINUED | OUTPATIENT
Start: 2020-11-16 | End: 2020-11-20 | Stop reason: HOSPADM

## 2020-11-16 RX ORDER — POTASSIUM CHLORIDE 1.5 G/1.58G
60 POWDER, FOR SOLUTION ORAL
Status: COMPLETED | OUTPATIENT
Start: 2020-11-16 | End: 2020-11-16

## 2020-11-16 RX ORDER — PROMETHAZINE HYDROCHLORIDE 25 MG/1
25 TABLET ORAL EVERY 6 HOURS PRN
Status: DISCONTINUED | OUTPATIENT
Start: 2020-11-17 | End: 2020-11-20 | Stop reason: HOSPADM

## 2020-11-16 RX ORDER — OXYCODONE AND ACETAMINOPHEN 5; 325 MG/1; MG/1
1 TABLET ORAL
Status: COMPLETED | OUTPATIENT
Start: 2020-11-16 | End: 2020-11-16

## 2020-11-16 RX ORDER — ACETAMINOPHEN 325 MG/1
650 TABLET ORAL EVERY 4 HOURS PRN
Status: DISCONTINUED | OUTPATIENT
Start: 2020-11-17 | End: 2020-11-20 | Stop reason: HOSPADM

## 2020-11-16 RX ORDER — TALC
6 POWDER (GRAM) TOPICAL NIGHTLY PRN
Status: DISCONTINUED | OUTPATIENT
Start: 2020-11-17 | End: 2020-11-20 | Stop reason: HOSPADM

## 2020-11-16 RX ORDER — BUPROPION HYDROCHLORIDE 100 MG/1
100 TABLET ORAL DAILY
Status: DISCONTINUED | OUTPATIENT
Start: 2020-11-17 | End: 2020-11-20 | Stop reason: HOSPADM

## 2020-11-16 RX ORDER — GABAPENTIN 100 MG/1
100 CAPSULE ORAL 3 TIMES DAILY
COMMUNITY

## 2020-11-16 RX ORDER — SODIUM CHLORIDE 0.9 % (FLUSH) 0.9 %
10 SYRINGE (ML) INJECTION
Status: DISCONTINUED | OUTPATIENT
Start: 2020-11-17 | End: 2020-11-20 | Stop reason: HOSPADM

## 2020-11-16 RX ORDER — GABAPENTIN 100 MG/1
100 CAPSULE ORAL 3 TIMES DAILY
Status: DISCONTINUED | OUTPATIENT
Start: 2020-11-17 | End: 2020-11-20 | Stop reason: HOSPADM

## 2020-11-16 RX ORDER — KETOROLAC TROMETHAMINE 30 MG/ML
30 INJECTION, SOLUTION INTRAMUSCULAR; INTRAVENOUS
Status: DISCONTINUED | OUTPATIENT
Start: 2020-11-16 | End: 2020-11-16

## 2020-11-16 RX ORDER — PANTOPRAZOLE SODIUM 40 MG/1
40 TABLET, DELAYED RELEASE ORAL 2 TIMES DAILY
Status: DISCONTINUED | OUTPATIENT
Start: 2020-11-17 | End: 2020-11-20 | Stop reason: HOSPADM

## 2020-11-16 RX ORDER — IBUPROFEN 200 MG
24 TABLET ORAL
Status: DISCONTINUED | OUTPATIENT
Start: 2020-11-17 | End: 2020-11-20 | Stop reason: HOSPADM

## 2020-11-16 RX ORDER — DEXTROSE MONOHYDRATE, SODIUM CHLORIDE, AND POTASSIUM CHLORIDE 50; 1.49; 9 G/1000ML; G/1000ML; G/1000ML
INJECTION, SOLUTION INTRAVENOUS CONTINUOUS
Status: DISCONTINUED | OUTPATIENT
Start: 2020-11-17 | End: 2020-11-18

## 2020-11-16 RX ADMIN — POTASSIUM CHLORIDE 60 MEQ: 1.5 POWDER, FOR SOLUTION ORAL at 08:11

## 2020-11-16 RX ADMIN — MAGNESIUM SULFATE 1 G: 1 INJECTION INTRAVENOUS at 09:11

## 2020-11-16 RX ADMIN — OXYCODONE HYDROCHLORIDE AND ACETAMINOPHEN 1 TABLET: 5; 325 TABLET ORAL at 09:11

## 2020-11-16 RX ADMIN — MAGNESIUM SULFATE 1 G: 1 INJECTION INTRAVENOUS at 08:11

## 2020-11-16 RX ADMIN — OXYCODONE HYDROCHLORIDE AND ACETAMINOPHEN 1 TABLET: 5; 325 TABLET ORAL at 06:11

## 2020-11-16 NOTE — ED PROVIDER NOTES
"Encounter Date: 11/16/2020    SCRIBE #1 NOTE: I, Terri Cohen, am scribing for, and in the presence of,  Gustavo Carrillo MD. I have scribed the following portions of the note - Other sections scribed: HPI, ROS, PE.       History     Chief Complaint   Patient presents with    Fall     pt comes in via EMS from home with c/o fall today and saturday. She c/o R shoulder and chest pain associated with fall. She states she hit her head but denies LOC or use of blood thinners.      This is a 60 y.o. female who presents to the ED complaining of right shoulder pain due to a fall that occurred 2 days ago. She reports that she was walking up stairs when she began to feel dizzy and fell down 6 stairs. She states that she landed on her face, but she denies any LOC. She notes that she had a headache yesterday, but it resolved after taking Gabapentin. She reports that EMS was called after the fall, but she felt like she didn't need to come to the ED at that time. She states that when she woke up this morning the pain was worse and she fell again today at 1200. She notes that she started having chest pain today that is worse with breathing. She adds that her shoulder pain is worse with movement. She reports that she drank vodka and water today to self medicate, but it didn't help. She states that she was recently in physical therapy and occupational therapy for alcohol withdrawal. She notes that she had been sober for 5 weeks until today. She states that she takes Wellbutrin, Trazodone, and Protonix daily and she has allergies to Aspirin and Advil. Denies blurry vision or any other associated symptoms.     The history is provided by the patient. No  was used.     Review of patient's allergies indicates:   Allergen Reactions    Aspirin      Due to gastric bypass    Ibuprofen      Other reaction(s): Due To Gastric Bypass  Due to gastric bypass    Inderal [propranolol]      "Felt bizarre"     Past Medical " History:   Diagnosis Date    Addiction to drug     Alcohol abuse     Colon polyps     Gastric bypass status for obesity 2006    GIB (gastrointestinal bleeding)     History of psychiatric hospitalization     Hx of psychiatric care     Hypertension     Postmenopausal HRT (hormone replacement therapy)     Psychiatric problem     Therapy     Ulcer      Past Surgical History:   Procedure Laterality Date    ABDOMINAL SURGERY      BREAST BIOPSY  2005    right    CHOLECYSTECTOMY      ESOPHAGOGASTRODUODENOSCOPY N/A 4/18/2019    Procedure: EGD (ESOPHAGOGASTRODUODENOSCOPY);  Surgeon: Mony Plunkett MD;  Location: [facility] ENDO;  Service: Endoscopy;  Laterality: N/A;    ESOPHAGOGASTRODUODENOSCOPY N/A 8/4/2020    Procedure: EGD (ESOPHAGOGASTRODUODENOSCOPY);  Surgeon: Jose F Oreilly MD;  Location: [facility] ENDO;  Service: Endoscopy;  Laterality: N/A;    GASTRIC BYPASS  2006    zackary en Y    HERNIA REPAIR  x2    LI hernia    HERNIA REPAIR      INFECTED SKIN DEBRIDEMENT  x3    KNEE ARTHROSCOPY W/ DEBRIDEMENT      right     Family History   Problem Relation Age of Onset    Cancer Mother         colon    Dementia Father     Hypertension Father     Hyperlipidemia Father     Alzheimer's disease Father 78    Cancer Paternal Grandfather         colon cancer    Breast cancer Maternal Uncle      Social History     Tobacco Use    Smoking status: Never Smoker    Smokeless tobacco: Never Used   Substance Use Topics    Alcohol use: Yes     Alcohol/week: 0.0 standard drinks     Comment: 1 small bottle wine daily (10oz) + 4 strong vodka cocktails daily    Drug use: No     Review of Systems   Constitutional: Negative for fever.   HENT: Negative for sore throat.    Eyes: Negative for visual disturbance.   Respiratory: Negative for shortness of breath.    Cardiovascular: Positive for chest pain.   Gastrointestinal: Negative for nausea.   Genitourinary: Negative for dysuria.   Musculoskeletal: Positive for arthralgias. Negative  for back pain.   Skin: Negative for rash.   Neurological: Positive for headaches (resolved). Negative for syncope and weakness.   Hematological: Does not bruise/bleed easily.       Physical Exam     Initial Vitals [11/16/20 1607]   BP Pulse Resp Temp SpO2   134/85 90 17 98.3 °F (36.8 °C) 100 %      MAP       --         Physical Exam    Nursing note and vitals reviewed.  Constitutional: She appears well-developed and well-nourished. She is not diaphoretic. No distress.   HENT:   Head: Normocephalic and atraumatic.   Eyes: Conjunctivae and EOM are normal. Pupils are equal, round, and reactive to light. No scleral icterus.   Neck: Normal range of motion. Neck supple.   Cardiovascular: Normal rate, regular rhythm, normal heart sounds and intact distal pulses. Exam reveals no gallop and no friction rub.    No murmur heard.  Pulmonary/Chest: Breath sounds normal. No respiratory distress. She has no wheezes. She has no rhonchi. She has no rales.   Abdominal: Soft. Bowel sounds are normal. She exhibits no distension. There is no abdominal tenderness. There is no rebound and no guarding.   Musculoskeletal: Normal range of motion. Tenderness present. No edema.      Comments: Tenderness to palpation to the right shoulder and right proximal humerus.    Neurological: She is alert and oriented to person, place, and time. She has normal strength. No cranial nerve deficit. GCS score is 15. GCS eye subscore is 4. GCS verbal subscore is 5. GCS motor subscore is 6.   Skin: Skin is warm and dry. No rash noted.   Psychiatric: She has a normal mood and affect. Her behavior is normal.         ED Course   Critical Care    Date/Time: 11/16/2020 8:59 PM  Performed by: Gustavo Carrillo MD  Authorized by: Leonila Alonzo MD   Direct patient critical care time: 30 minutes  Total critical care time (exclusive of procedural time) : 30 minutes  Critical care was necessary to treat or prevent imminent or life-threatening deterioration of the  following conditions: cardiac failure.        Labs Reviewed   CBC W/ AUTO DIFFERENTIAL - Abnormal; Notable for the following components:       Result Value    RDW 16.7 (*)     Gran % 74.2 (*)     All other components within normal limits   COMPREHENSIVE METABOLIC PANEL - Abnormal; Notable for the following components:    CO2 15 (*)     Calcium 8.5 (*)     Albumin 3.1 (*)     Alkaline Phosphatase 216 (*)     AST 88 (*)     Anion Gap 20 (*)     All other components within normal limits   MAGNESIUM - Abnormal; Notable for the following components:    Magnesium 1.5 (*)     All other components within normal limits   ISTAT PROCEDURE - Abnormal; Notable for the following components:    POC Glucose 112 (*)     POC Potassium 2.8 (*)     POC TCO2 (MEASURED) 18 (*)     POC Anion Gap 26 (*)     POC Hematocrit 35 (*)     All other components within normal limits   TROPONIN I   SARS-COV-2 RDRP GENE   ISTAT CHEM8     EKG Readings: (Independently Interpreted)   Initial Reading: No STEMI. Rhythm: Normal Sinus Rhythm. Heart Rate: 87.   No ST segment elevation or depression concerning for acute ischemia.   Prolonged QT       Imaging Results          CT Shoulder Without Contrast Right (Final result)  Result time 11/16/20 20:22:22    Final result by Glo Velasquez MD (11/16/20 20:22:22)                 Impression:      Undisplaced fracture of the greater tuberosity.    (Delay in interpretation due to technical issue.)      Electronically signed by: Glo Velasquez  Date:    11/16/2020  Time:    20:22             Narrative:    EXAMINATION:  CT SHOULDER WITHOUT CONTRAST RIGHT    CLINICAL HISTORY:  Shoulder trauma, nondiagnostic xray;    TECHNIQUE:  1.25 mm axial images of the right shoulder were performed.  Coronal and sagittal reformatted images are provided.    COMPARISON:  None.    FINDINGS:  There is a nondisplaced fracture of the greater tuberosity.  There is a remote fracture involving the distal clavicle, which is healed.   There is no shoulder dislocation.  Small fluid is seen inferior and medial to the humeral head (series 201 sagittal image 23).  Degenerative changes are seen at the acromioclavicular joint and the visualized cervical spine.                               X-Ray Chest PA And Lateral (Final result)  Result time 11/16/20 17:13:39    Final result by Thiago Smiley MD (11/16/20 17:13:39)                 Impression:      No acute radiographic findings in the chest.      Electronically signed by: Thiago Smiley MD  Date:    11/16/2020  Time:    17:13             Narrative:    EXAMINATION:  XR CHEST PA AND LATERAL    CLINICAL HISTORY:  Unspecified fall, initial encounter    TECHNIQUE:  PA and lateral views of the chest were performed.    COMPARISON:  09/03/2020    FINDINGS:  Cardiac silhouette and pulmonary vascularity are within normal limits.  Lungs are symmetrically expanded and show no evidence of significant focal consolidation, large effusion or pneumothorax.  Bones demonstrated a remote right clavicular fracture.  No evidence of acute fracture.  There are cholecystectomy clips.                               X-Ray Shoulder Complete 2 View Right (Final result)  Result time 11/16/20 17:18:10    Final result by Thiago Smiley MD (11/16/20 17:18:10)                 Impression:      No acute bony abnormalities.  Remote distal clavicular fracture.      Electronically signed by: Thiago Smiley MD  Date:    11/16/2020  Time:    17:18             Narrative:    EXAMINATION:  XR SHOULDER COMPLETE 2 OR MORE VIEWS RIGHT    CLINICAL HISTORY:  Unspecified fall, initial encounter    TECHNIQUE:  Two or three views of the right shoulder were performed.    COMPARISON:  None    FINDINGS:  There is a remote fracture of the right distal clavicle.  There are degenerative changes of the acromioclavicular joint.  There is no evidence of acute fracture, dislocation or bone destruction.  Surrounding soft tissue structures show no acute  abnormalities.                                 Medical Decision Making:   Initial Assessment:   60-year-old female presenting with multiple falls and dizziness.  Patient reports right shoulder pain.  Tender to palpation.  X-ray negative.  Patient given pain control with improved pain, though still significant limited range of motion and point tenderness.  CT scan shoulder reveals shoulder fracture.  EKG shows significant prolonged QTC.  Labs initially reveal normal potassium but with substantial hemolysis.  Repeat potassium shows level of 2.8.  Magnesium level also mildly decreased.  Concerned given falls with dizziness that this could be related to arrhythmia due to prolonged QTC.  No evidence of arrhythmia on the monitor.  Patient will be admitted to Hospital Medicine for further treatment and evaluation of hypokalemia, prolonged QTC.  Repeat EKG shows QTC has actually increased to 630.  Patient is asymptomatic at this time aside from pain in the shoulder.  Pain controlled with Percocet.  Patient has a history of alcoholism.  Reports she has been sober including her initial fall with shoulder pain.  She reports she had 1 glass of vodka today in an attempt to treat pain.  Patient does not appear inebriated or intoxicated in any way.  She reports she is committed to sobriety and is remorseful for her alcohol today.  Clinical Tests:   Radiological Study: Ordered and Reviewed  Medical Tests: Ordered and Reviewed            Scribe Attestation:   Scribe #1: I performed the above scribed service and the documentation accurately describes the services I performed. I attest to the accuracy of the note.            ED Course as of Nov 16 2102   Mon Nov 16, 2020 2014 Comprehensive metabolic panel [GS]      ED Course User Index  [GS] Gustavo Carrillo MD            Clinical Impression:       ICD-10-CM ICD-9-CM   1. Fracture of right shoulder girdle, part unspecified, initial encounter for open fracture  S42.91XB 812.10    2. Fall  W19.XXXA E888.9                      Disposition:   Disposition: Discharged  Condition: Stable     ED Disposition Condition    Admit            Scribe Attestation: I, Gustavo Carrillo, personally performed the services described in this documentation. All medical record entries made by the scribe were at my direction and in my presence. I have reviewed the chart and agree that the record reflects my personal performance and is accurate and complete.                 Gustavo Carrillo MD  11/16/20 4786

## 2020-11-17 PROBLEM — E83.42 HYPOMAGNESEMIA: Status: ACTIVE | Noted: 2020-11-17

## 2020-11-17 PROBLEM — R94.31 PROLONGED QT INTERVAL: Status: ACTIVE | Noted: 2020-11-17

## 2020-11-17 PROBLEM — E87.6 HYPOKALEMIA: Status: ACTIVE | Noted: 2020-11-17
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
## 2020-11-17 PROBLEM — F10.20 ALCOHOLISM: Status: ACTIVE | Noted: 2020-11-17

## 2020-11-17 LAB
ANION GAP SERPL CALC-SCNC: 11 MMOL/L (ref 8–16)
ANION GAP SERPL CALC-SCNC: 16 MMOL/L (ref 8–16)
BASOPHILS # BLD AUTO: 0.04 K/UL (ref 0–0.2)
BASOPHILS NFR BLD: 0.7 % (ref 0–1.9)
BUN SERPL-MCNC: 10 MG/DL (ref 6–20)
BUN SERPL-MCNC: 8 MG/DL (ref 6–20)
CALCIUM SERPL-MCNC: 7.9 MG/DL (ref 8.7–10.5)
CALCIUM SERPL-MCNC: 8 MG/DL (ref 8.7–10.5)
CHLORIDE SERPL-SCNC: 103 MMOL/L (ref 95–110)
CHLORIDE SERPL-SCNC: 104 MMOL/L (ref 95–110)
CO2 SERPL-SCNC: 16 MMOL/L (ref 23–29)
CO2 SERPL-SCNC: 26 MMOL/L (ref 23–29)
CREAT SERPL-MCNC: 0.7 MG/DL (ref 0.5–1.4)
CREAT SERPL-MCNC: 0.7 MG/DL (ref 0.5–1.4)
DIFFERENTIAL METHOD: ABNORMAL
EOSINOPHIL # BLD AUTO: 0.1 K/UL (ref 0–0.5)
EOSINOPHIL NFR BLD: 0.9 % (ref 0–8)
ERYTHROCYTE [DISTWIDTH] IN BLOOD BY AUTOMATED COUNT: 16.6 % (ref 11.5–14.5)
EST. GFR  (AFRICAN AMERICAN): >60 ML/MIN/1.73 M^2
EST. GFR  (AFRICAN AMERICAN): >60 ML/MIN/1.73 M^2
EST. GFR  (NON AFRICAN AMERICAN): >60 ML/MIN/1.73 M^2
EST. GFR  (NON AFRICAN AMERICAN): >60 ML/MIN/1.73 M^2
GLUCOSE SERPL-MCNC: 108 MG/DL (ref 70–110)
GLUCOSE SERPL-MCNC: 116 MG/DL (ref 70–110)
HCT VFR BLD AUTO: 32.7 % (ref 37–48.5)
HGB BLD-MCNC: 10.8 G/DL (ref 12–16)
IMM GRANULOCYTES # BLD AUTO: 0.02 K/UL (ref 0–0.04)
IMM GRANULOCYTES NFR BLD AUTO: 0.4 % (ref 0–0.5)
LYMPHOCYTES # BLD AUTO: 1.2 K/UL (ref 1–4.8)
LYMPHOCYTES NFR BLD: 22.9 % (ref 18–48)
MAGNESIUM SERPL-MCNC: 2 MG/DL (ref 1.6–2.6)
MAGNESIUM SERPL-MCNC: 2.1 MG/DL (ref 1.6–2.6)
MCH RBC QN AUTO: 29.7 PG (ref 27–31)
MCHC RBC AUTO-ENTMCNC: 33 G/DL (ref 32–36)
MCV RBC AUTO: 90 FL (ref 82–98)
MONOCYTES # BLD AUTO: 0.5 K/UL (ref 0.3–1)
MONOCYTES NFR BLD: 9.2 % (ref 4–15)
NEUTROPHILS # BLD AUTO: 3.6 K/UL (ref 1.8–7.7)
NEUTROPHILS NFR BLD: 65.9 % (ref 38–73)
NRBC BLD-RTO: 0 /100 WBC
PHOSPHATE SERPL-MCNC: 2.5 MG/DL (ref 2.7–4.5)
PLATELET # BLD AUTO: 203 K/UL (ref 150–350)
PMV BLD AUTO: 11.3 FL (ref 9.2–12.9)
POTASSIUM SERPL-SCNC: 3.6 MMOL/L (ref 3.5–5.1)
POTASSIUM SERPL-SCNC: 3.8 MMOL/L (ref 3.5–5.1)
RBC # BLD AUTO: 3.64 M/UL (ref 4–5.4)
SODIUM SERPL-SCNC: 136 MMOL/L (ref 136–145)
SODIUM SERPL-SCNC: 140 MMOL/L (ref 136–145)
WBC # BLD AUTO: 5.42 K/UL (ref 3.9–12.7)

## 2020-11-17 PROCEDURE — 25000003 PHARM REV CODE 250: Performed by: INTERNAL MEDICINE

## 2020-11-17 PROCEDURE — 63600175 PHARM REV CODE 636 W HCPCS: Performed by: HOSPITALIST

## 2020-11-17 PROCEDURE — 11000001 HC ACUTE MED/SURG PRIVATE ROOM

## 2020-11-17 PROCEDURE — 63600175 PHARM REV CODE 636 W HCPCS: Performed by: INTERNAL MEDICINE

## 2020-11-17 PROCEDURE — 25000003 PHARM REV CODE 250: Performed by: HOSPITALIST

## 2020-11-17 PROCEDURE — 97165 OT EVAL LOW COMPLEX 30 MIN: CPT

## 2020-11-17 PROCEDURE — 97161 PT EVAL LOW COMPLEX 20 MIN: CPT

## 2020-11-17 PROCEDURE — 80048 BASIC METABOLIC PNL TOTAL CA: CPT

## 2020-11-17 PROCEDURE — 36415 COLL VENOUS BLD VENIPUNCTURE: CPT

## 2020-11-17 PROCEDURE — 84100 ASSAY OF PHOSPHORUS: CPT

## 2020-11-17 PROCEDURE — 83735 ASSAY OF MAGNESIUM: CPT

## 2020-11-17 PROCEDURE — 94761 N-INVAS EAR/PLS OXIMETRY MLT: CPT

## 2020-11-17 PROCEDURE — 85025 COMPLETE CBC W/AUTO DIFF WBC: CPT

## 2020-11-17 RX ORDER — HYDROMORPHONE HYDROCHLORIDE 2 MG/ML
0.5 INJECTION, SOLUTION INTRAMUSCULAR; INTRAVENOUS; SUBCUTANEOUS ONCE
Status: COMPLETED | OUTPATIENT
Start: 2020-11-17 | End: 2020-11-17

## 2020-11-17 RX ORDER — OXYCODONE HYDROCHLORIDE 5 MG/1
5 TABLET ORAL EVERY 4 HOURS PRN
Status: DISCONTINUED | OUTPATIENT
Start: 2020-11-17 | End: 2020-11-20 | Stop reason: HOSPADM

## 2020-11-17 RX ORDER — DIAZEPAM 5 MG/1
5 TABLET ORAL ONCE
Status: COMPLETED | OUTPATIENT
Start: 2020-11-17 | End: 2020-11-17

## 2020-11-17 RX ORDER — OXYCODONE HYDROCHLORIDE 5 MG/1
10 TABLET ORAL EVERY 4 HOURS PRN
Status: DISCONTINUED | OUTPATIENT
Start: 2020-11-17 | End: 2020-11-20 | Stop reason: HOSPADM

## 2020-11-17 RX ORDER — HYDROMORPHONE HYDROCHLORIDE 2 MG/ML
1 INJECTION, SOLUTION INTRAMUSCULAR; INTRAVENOUS; SUBCUTANEOUS EVERY 4 HOURS PRN
Status: DISCONTINUED | OUTPATIENT
Start: 2020-11-17 | End: 2020-11-20 | Stop reason: HOSPADM

## 2020-11-17 RX ADMIN — FOLIC ACID 1 MG: 1 TABLET ORAL at 07:11

## 2020-11-17 RX ADMIN — THERA TABS 1 TABLET: TAB at 07:11

## 2020-11-17 RX ADMIN — FOLIC ACID 1 MG: 1 TABLET ORAL at 12:11

## 2020-11-17 RX ADMIN — THERA TABS 1 TABLET: TAB at 12:11

## 2020-11-17 RX ADMIN — DEXTROSE MONOHYDRATE, SODIUM CHLORIDE, AND POTASSIUM CHLORIDE: 50; 9; 1.49 INJECTION, SOLUTION INTRAVENOUS at 12:11

## 2020-11-17 RX ADMIN — PANTOPRAZOLE SODIUM 40 MG: 40 TABLET, DELAYED RELEASE ORAL at 07:11

## 2020-11-17 RX ADMIN — HYDROMORPHONE HYDROCHLORIDE 1 MG: 2 INJECTION INTRAMUSCULAR; INTRAVENOUS; SUBCUTANEOUS at 09:11

## 2020-11-17 RX ADMIN — DIAZEPAM 5 MG: 5 TABLET ORAL at 01:11

## 2020-11-17 RX ADMIN — Medication 100 MG: at 12:11

## 2020-11-17 RX ADMIN — BUPROPION HYDROCHLORIDE 100 MG: 100 TABLET, FILM COATED ORAL at 07:11

## 2020-11-17 RX ADMIN — Medication 100 MG: at 07:11

## 2020-11-17 RX ADMIN — PANTOPRAZOLE SODIUM 40 MG: 40 TABLET, DELAYED RELEASE ORAL at 09:11

## 2020-11-17 RX ADMIN — DEXTROSE MONOHYDRATE, SODIUM CHLORIDE, AND POTASSIUM CHLORIDE: 50; 9; 1.49 INJECTION, SOLUTION INTRAVENOUS at 10:11

## 2020-11-17 RX ADMIN — TRAZODONE HYDROCHLORIDE 50 MG: 50 TABLET ORAL at 09:11

## 2020-11-17 RX ADMIN — OXYCODONE 10 MG: 5 TABLET ORAL at 07:11

## 2020-11-17 RX ADMIN — HYDROMORPHONE HYDROCHLORIDE 0.5 MG: 2 INJECTION INTRAMUSCULAR; INTRAVENOUS; SUBCUTANEOUS at 12:11

## 2020-11-17 RX ADMIN — POTASSIUM PHOSPHATE, MONOBASIC 500 MG: 500 TABLET, SOLUBLE ORAL at 09:11

## 2020-11-17 RX ADMIN — HYDROMORPHONE HYDROCHLORIDE 1 MG: 2 INJECTION INTRAMUSCULAR; INTRAVENOUS; SUBCUTANEOUS at 11:11

## 2020-11-17 RX ADMIN — GABAPENTIN 100 MG: 100 CAPSULE ORAL at 09:11

## 2020-11-17 RX ADMIN — FLUOXETINE 60 MG: 20 CAPSULE ORAL at 07:11

## 2020-11-17 RX ADMIN — POTASSIUM PHOSPHATE, MONOBASIC 500 MG: 500 TABLET, SOLUBLE ORAL at 10:11

## 2020-11-17 RX ADMIN — HYDROMORPHONE HYDROCHLORIDE 1 MG: 2 INJECTION INTRAMUSCULAR; INTRAVENOUS; SUBCUTANEOUS at 04:11

## 2020-11-17 RX ADMIN — GABAPENTIN 100 MG: 100 CAPSULE ORAL at 07:11

## 2020-11-17 RX ADMIN — GABAPENTIN 100 MG: 100 CAPSULE ORAL at 04:11

## 2020-11-17 NOTE — PT/OT/SLP EVAL
Physical Therapy Evaluation    Patient Name:  Alicia Chu   MRN:  [id number]    Recommendations:     Discharge Recommendations:  (PT at next level)   Discharge Equipment Recommendations: shower chair, walker, rolling   Barriers to discharge: Pt is at high risk for falls and re-admission if she returns home at present time.  Has 10-12 Steps at home    Assessment:     Alicia Chu is a 60 y.o. female admitted with a medical diagnosis of Fracture of right shoulder girdle, part unspecified, initial encounter for open fracture.  She presents with the following impairments/functional limitations:  weakness, impaired balance, impaired self care skills, decreased safety awareness, decreased coordination, impaired endurance, impaired functional mobilty, decreased upper extremity function, pain, impaired coordination, gait instability, impaired fine motor .    Rehab Prognosis: Fair; patient would benefit from acute skilled PT services to address these deficits and reach maximum level of function.    Recent Surgery: * No surgery found *      Plan:     During this hospitalization, patient to be seen (3-5x/wk) to address the identified rehab impairments via gait training, therapeutic activities, therapeutic exercises and progress toward the following goals:    · Plan of Care Expires:  12/01/20    Subjective     Chief Complaint: pain   Patient/Family Comments/goals: Pt states that she was supposed to enter an alcohol rehab program, but that she was too weak.  She states that she was hospitalized at [facility] 10/20 for about a week and then entered [facility] from where she just D/C'd to home on 11/5.  Pt states that she got dizzy on the stairs and fell  Pain/Comfort:  Pain Rating 1: 8/10  Location 1: (R shoulder)  Pain Addressed 1: Pre-medicate for activity, Reposition, Distraction, Cessation of Activity  Pain Rating Post-Intervention 1: 8/10    Patients cultural, spiritual, Yazidism conflicts given the current situation:  no    Living Environment:  Pt lives with her spouse for whom she has zonia the primary CG.  2-matheus home, spouse sleeps downstairs in hospital bed.  She was staying upstairs  Prior to admission, patients level of function was Modified Independent for ambulation and ADL's.  Equipment used at home: bedside commode.  DME owned (not currently used): rolling walker and rollator.  Upon discharge, patient will have assistance from ?  Neighbor?, Step-daughter?.    Objective:     Communicated with Nsg prior to session.  Patient found HOB elevated with telemetry, bed alarm  upon PT entry to room.    General Precautions: Standard, fall   Orthopedic Precautions:N/A   Braces: N/A     Exams:  · Cognitive Exam:  Patient is oriented to Person, Place, Time and Situation  · Gross Motor Coordination:  impaired 2/2 gen weakness, pain, deconditioning  · Postural Exam:  Patient presented with the following abnormalities:    · -       Rounded shoulders  · -       Forward head  · Skin Integrity/Edema:      · -       Skin integrity: Bruising of back  · RLE ROM: WFL  · RLE Strength: WFL  · LLE ROM: WFL  · LLE Strength: WFL    Functional Mobility:  · Bed Mobility:     · Scooting: stand by assistance  · Supine to Sit: stand by assistance  · Transfers:     · Sit to Stand:  contact guard assistance with rolling walker  · Gait: approx 12' with CGA with patient grabbing walls and furniture.  Approx 10' with RW and SBA  · Balance: Fair+ sit and stand    Therapeutic Activities and Exercises:   eval only, pt encouraged to perform B AP's, GS, and QS while up in chair.  Pt verbalized/demonstrated understanding    AM-PAC 6 CLICK MOBILITY  Total Score:18     Patient left up in chair with all lines intact, call button in reach, chair alarm on and nsg notified.    GOALS:   Multidisciplinary Problems     Physical Therapy Goals        Problem: Physical Therapy Goal    Goal Priority Disciplines Outcome Goal Variances Interventions   Physical Therapy Goal      PT, PT/OT Ongoing, Progressing                     History:     Past Medical History:   Diagnosis Date    Addiction to drug     Alcohol abuse     Colon polyps     Gastric bypass status for obesity 2006    GIB (gastrointestinal bleeding)     History of psychiatric hospitalization     Hx of psychiatric care     Hypertension     Postmenopausal HRT (hormone replacement therapy)     Psychiatric problem     Therapy     Ulcer        Past Surgical History:   Procedure Laterality Date    ABDOMINAL SURGERY      BREAST BIOPSY  2005    right    CHOLECYSTECTOMY      ESOPHAGOGASTRODUODENOSCOPY N/A 4/18/2019    Procedure: EGD (ESOPHAGOGASTRODUODENOSCOPY);  Surgeon: Mony Plunkett MD;  Location: [facility] ENDO;  Service: Endoscopy;  Laterality: N/A;    ESOPHAGOGASTRODUODENOSCOPY N/A 8/4/2020    Procedure: EGD (ESOPHAGOGASTRODUODENOSCOPY);  Surgeon: Jose F Oreilly MD;  Location: [facility] ENDO;  Service: Endoscopy;  Laterality: N/A;    GASTRIC BYPASS  2006    zackary en Y    HERNIA REPAIR  x2    LI hernia    HERNIA REPAIR      INFECTED SKIN DEBRIDEMENT  x3    KNEE ARTHROSCOPY W/ DEBRIDEMENT      right       Time Tracking:     PT Received On: 11/17/20  PT Start Time: 1458     PT Stop Time: 1515  PT Total Time (min): 17 min     Billable Minutes: Evaluation 15      Audra Salas, PT  11/17/2020

## 2020-11-17 NOTE — SUBJECTIVE & OBJECTIVE
"Past Medical History:   Diagnosis Date    Addiction to drug     Alcohol abuse     Colon polyps     Gastric bypass status for obesity 2006    GIB (gastrointestinal bleeding)     History of psychiatric hospitalization     Hx of psychiatric care     Hypertension     Postmenopausal HRT (hormone replacement therapy)     Psychiatric problem     Therapy     Ulcer        Past Surgical History:   Procedure Laterality Date    ABDOMINAL SURGERY      BREAST BIOPSY  2005    right    CHOLECYSTECTOMY      ESOPHAGOGASTRODUODENOSCOPY N/A 4/18/2019    Procedure: EGD (ESOPHAGOGASTRODUODENOSCOPY);  Surgeon: Mony Plunkett MD;  Location: [facility] ENDO;  Service: Endoscopy;  Laterality: N/A;    ESOPHAGOGASTRODUODENOSCOPY N/A 8/4/2020    Procedure: EGD (ESOPHAGOGASTRODUODENOSCOPY);  Surgeon: Jose F Oreilly MD;  Location: [facility] ENDO;  Service: Endoscopy;  Laterality: N/A;    GASTRIC BYPASS  2006    zackary en Y    HERNIA REPAIR  x2    LI hernia    HERNIA REPAIR      INFECTED SKIN DEBRIDEMENT  x3    KNEE ARTHROSCOPY W/ DEBRIDEMENT      right       Review of patient's allergies indicates:   Allergen Reactions    Aspirin      Due to gastric bypass    Ibuprofen      Other reaction(s): Due To Gastric Bypass  Due to gastric bypass    Inderal [propranolol]      "Felt bizarre"       No current facility-administered medications on file prior to encounter.      Current Outpatient Medications on File Prior to Encounter   Medication Sig    buPROPion (WELLBUTRIN) 100 MG tablet Take 1 tablet (100 mg total) by mouth once daily.    folic acid (FOLVITE) 1 MG tablet Take 1 tablet (1 mg total) by mouth once daily.    gabapentin (NEURONTIN) 100 MG capsule Take 100 mg by mouth 3 (three) times daily.    hydrOXYzine pamoate (VISTARIL) 25 MG Cap     multivitamin Tab Take 1 tablet by mouth once daily.    thiamine 100 MG tablet Take 1 tablet (100 mg total) by mouth once daily.    trazodone HCl (TRAZODONE ORAL)     chlordiazepoxide (LIBRIUM) 25 " MG Cap Take 2 capsules (50 mg total) by mouth 4 (four) times daily for 1 day, THEN 1 capsule (25 mg total) 4 (four) times daily for 1 day, THEN 1 capsule (25 mg total) every 12 (twelve) hours for 1 day, THEN 1 capsule (25 mg total) nightly for 1 day.    FLUoxetine 20 MG capsule Take 3 capsules (60 mg total) by mouth once daily. Also taking Fluoxetine 40 mg QAM    pantoprazole (PROTONIX) 40 MG tablet Take 1 tablet (40 mg total) by mouth 2 (two) times daily.     Family History     Problem Relation (Age of Onset)    Alzheimer's disease Father (78)    Breast cancer Maternal Uncle    Cancer Mother, Paternal Grandfather    Dementia Father    Hyperlipidemia Father    Hypertension Father        Tobacco Use    Smoking status: Never Smoker    Smokeless tobacco: Never Used   Substance and Sexual Activity    Alcohol use: Yes     Alcohol/week: 0.0 standard drinks     Comment: 1 small bottle wine daily (10oz) + 4 strong vodka cocktails daily    Drug use: No    Sexual activity: Yes     Partners: Male     Birth control/protection: None     Review of Systems   Constitutional: Positive for activity change and appetite change. Negative for chills, fatigue and fever.   HENT: Negative for congestion.    Eyes: Negative for photophobia.   Respiratory: Negative for cough and shortness of breath.    Cardiovascular: Negative for chest pain.   Gastrointestinal: Positive for nausea. Negative for abdominal pain, constipation, diarrhea and vomiting.   Endocrine: Negative for heat intolerance.   Genitourinary: Negative for dysuria.   Musculoskeletal: Positive for arthralgias and gait problem.   Skin: Negative for rash.   Allergic/Immunologic: Negative for immunocompromised state.   Neurological: Positive for light-headedness. Negative for dizziness and weakness.   Hematological: Does not bruise/bleed easily.   Psychiatric/Behavioral: Positive for dysphoric mood. Negative for confusion. The patient is not nervous/anxious.      Objective:      Vital Signs (Most Recent):  Temp: 98 °F (36.7 °C) (11/16/20 2236)  Pulse: 89 (11/16/20 2236)  Resp: 19 (11/17/20 0021)  BP: 135/82 (11/16/20 2236)  SpO2: 98 % (11/16/20 2236) Vital Signs (24h Range):  Temp:  [98 °F (36.7 °C)-98.3 °F (36.8 °C)] 98 °F (36.7 °C)  Pulse:  [82-94] 89  Resp:  [17-19] 19  SpO2:  [98 %-100 %] 98 %  BP: (134-154)/(82-91) 135/82     Weight: 70.3 kg (155 lb)  Body mass index is 25.79 kg/m².    Physical Exam  Vitals signs and nursing note reviewed.   Constitutional:       General: She is not in acute distress.     Appearance: She is well-developed. She is not ill-appearing, toxic-appearing or diaphoretic.   HENT:      Head: Normocephalic and atraumatic.      Mouth/Throat:      Pharynx: No oropharyngeal exudate.   Eyes:      General: No scleral icterus.        Right eye: No discharge.         Left eye: No discharge.      Conjunctiva/sclera: Conjunctivae normal.      Pupils: Pupils are equal, round, and reactive to light.   Neck:      Musculoskeletal: Normal range of motion and neck supple.      Thyroid: No thyromegaly.      Vascular: No JVD.      Trachea: No tracheal deviation.   Cardiovascular:      Rate and Rhythm: Normal rate and regular rhythm.      Heart sounds: Normal heart sounds. No murmur. No friction rub. No gallop.    Pulmonary:      Effort: Pulmonary effort is normal. No respiratory distress.      Breath sounds: Normal breath sounds. No stridor. No decreased breath sounds, wheezing, rhonchi or rales.   Chest:      Chest wall: No tenderness.   Abdominal:      General: Bowel sounds are normal. There is no distension.      Palpations: Abdomen is soft. There is no mass.      Tenderness: There is no abdominal tenderness. There is no guarding or rebound.   Genitourinary:     Comments: No singh  Musculoskeletal:      Right shoulder: She exhibits decreased range of motion, tenderness, bony tenderness and swelling.   Lymphadenopathy:      Cervical: No cervical adenopathy.      Comments:  No peripheral edema   Skin:     General: Skin is warm and dry.      Coloration: Skin is not pale.      Findings: No erythema or rash.   Neurological:      Mental Status: She is alert and oriented to person, place, and time.      GCS: GCS eye subscore is 4. GCS verbal subscore is 5. GCS motor subscore is 6.      Cranial Nerves: No cranial nerve deficit.      Motor: No abnormal muscle tone.      Coordination: Coordination normal.   Psychiatric:         Mood and Affect: Mood is depressed. Affect is flat.         Behavior: Behavior normal.         Thought Content: Thought content normal.         Cognition and Memory: Cognition and memory normal.         Judgment: Judgment normal.           CRANIAL NERVES     CN III, IV, VI   Pupils are equal, round, and reactive to light.       Significant Labs:   Recent Results (from the past 24 hour(s))   CBC auto differential    Collection Time: 11/16/20  7:32 PM   Result Value Ref Range    WBC 8.02 3.90 - 12.70 K/uL    RBC 4.23 4.00 - 5.40 M/uL    Hemoglobin 12.6 12.0 - 16.0 g/dL    Hematocrit 37.7 37.0 - 48.5 %    MCV 89 82 - 98 fL    MCH 29.8 27.0 - 31.0 pg    MCHC 33.4 32.0 - 36.0 g/dL    RDW 16.7 (H) 11.5 - 14.5 %    Platelets 265 150 - 350 K/uL    MPV 12.0 9.2 - 12.9 fL    Immature Granulocytes 0.4 0.0 - 0.5 %    Gran # (ANC) 6.0 1.8 - 7.7 K/uL    Immature Grans (Abs) 0.03 0.00 - 0.04 K/uL    Lymph # 1.5 1.0 - 4.8 K/uL    Mono # 0.5 0.3 - 1.0 K/uL    Eos # 0.0 0.0 - 0.5 K/uL    Baso # 0.05 0.00 - 0.20 K/uL    nRBC 0 0 /100 WBC    Gran % 74.2 (H) 38.0 - 73.0 %    Lymph % 19.0 18.0 - 48.0 %    Mono % 5.7 4.0 - 15.0 %    Eosinophil % 0.1 0.0 - 8.0 %    Basophil % 0.6 0.0 - 1.9 %    Differential Method Automated    Comprehensive metabolic panel    Collection Time: 11/16/20  7:32 PM   Result Value Ref Range    Sodium 136 136 - 145 mmol/L    Potassium 3.8 3.5 - 5.1 mmol/L    Chloride 101 95 - 110 mmol/L    CO2 15 (L) 23 - 29 mmol/L    Glucose 110 70 - 110 mg/dL    BUN 7 6 - 20 mg/dL     Creatinine 0.8 0.5 - 1.4 mg/dL    Calcium 8.5 (L) 8.7 - 10.5 mg/dL    Total Protein 7.1 6.0 - 8.4 g/dL    Albumin 3.1 (L) 3.5 - 5.2 g/dL    Total Bilirubin 0.6 0.1 - 1.0 mg/dL    Alkaline Phosphatase 216 (H) 55 - 135 U/L    AST 88 (H) 10 - 40 U/L    ALT 41 10 - 44 U/L    Anion Gap 20 (H) 8 - 16 mmol/L    eGFR if African American >60 >60 mL/min/1.73 m^2    eGFR if non African American >60 >60 mL/min/1.73 m^2   Troponin I    Collection Time: 11/16/20  7:32 PM   Result Value Ref Range    Troponin I 0.017 0.000 - 0.026 ng/mL   Magnesium    Collection Time: 11/16/20  7:32 PM   Result Value Ref Range    Magnesium 1.5 (L) 1.6 - 2.6 mg/dL   ISTAT PROCEDURE    Collection Time: 11/16/20  8:34 PM   Result Value Ref Range    POC Glucose 112 (H) 70 - 110 mg/dL    POC BUN 6 6 - 30 mg/dL    POC Creatinine 0.9 0.5 - 1.4 mg/dL    POC Sodium 138 136 - 145 mmol/L    POC Potassium 2.8 (LL) 3.5 - 5.1 mmol/L    POC Chloride 99 95 - 110 mmol/L    POC TCO2 (MEASURED) 18 (L) 23 - 29 mmol/L    POC Anion Gap 26 (H) 8 - 16 mmol/L    POC Ionized Calcium 1.07 1.06 - 1.42 mmol/L    POC Hematocrit 35 (L) 36 - 54 %PCV    Sample VENOUS    POCT COVID-19 Rapid Screening    Collection Time: 11/16/20  9:11 PM   Result Value Ref Range    POC Rapid COVID Negative Negative     Acceptable Yes    Basic Metabolic Panel    Collection Time: 11/16/20 11:41 PM   Result Value Ref Range    Sodium 136 136 - 145 mmol/L    Potassium 3.8 3.5 - 5.1 mmol/L    Chloride 104 95 - 110 mmol/L    CO2 16 (L) 23 - 29 mmol/L    Glucose 108 70 - 110 mg/dL    BUN 8 6 - 20 mg/dL    Creatinine 0.7 0.5 - 1.4 mg/dL    Calcium 7.9 (L) 8.7 - 10.5 mg/dL    Anion Gap 16 8 - 16 mmol/L    eGFR if African American >60 >60 mL/min/1.73 m^2    eGFR if non African American >60 >60 mL/min/1.73 m^2   Magnesium    Collection Time: 11/16/20 11:41 PM   Result Value Ref Range    Magnesium 2.1 1.6 - 2.6 mg/dL     Significant Imaging:   XR CHEST PA AND LATERAL  FINDINGS:  Cardiac  silhouette and pulmonary vascularity are within normal limits.  Lungs are symmetrically expanded and show no evidence of significant focal consolidation, large effusion or pneumothorax.  Bones demonstrated a remote right clavicular fracture.  No evidence of acute fracture.  There are cholecystectomy clips.     Impression:   No acute radiographic findings in the chest.      Electronically signed by: Thiago Smiley MD  Date:                                            11/16/2020  Time:                                           17:13    XR SHOULDER COMPLETE 2 OR MORE VIEWS RIGHT  FINDINGS:  There is a remote fracture of the right distal clavicle.  There are degenerative changes of the acromioclavicular joint.  There is no evidence of acute fracture, dislocation or bone destruction.  Surrounding soft tissue structures show no acute abnormalities.     Impression:   No acute bony abnormalities.  Remote distal clavicular fracture.      Electronically signed by: Thiago Smiley MD  Date:                                            11/16/2020  Time:                                           17:18    16-NOV-2020 18:05:24 EKG   Normal sinus rhythm  Vent. rate 87 BPM  MS interval 138 ms  QRS duration 84 ms   QTc 390 ms (Manual calculation by Bazett measurement)   QTc  534 ms (EKG computer calculation)  Prolonged QT  Abnormal ECG  When compared with ECG of 03-SEP-2020 13:11,  Significant changes have occurred    16-NOV-2020 20:38:37 EKG    Normal sinus rhythm  Vent. rate 88 BPM  MS interval 130 ms  QRS duration 84 ms   QTc  412 ms (manual calculation by Bazett measurement)    QTc 634 ms (EKG computer calculation)  Prolonged QT  Abnormal ECG  When compared with ECG of 16-NOV-2020 18:05,  Significant changes have occurred

## 2020-11-17 NOTE — CARE UPDATE
Patient has bene seen and examinated,agree with A/P of ,spoke with ortho. Probably\y does not need any intervention,consulted PT,OT,pain management.

## 2020-11-17 NOTE — PLAN OF CARE
Problem: Fall Injury Risk  Goal: Absence of Fall and Fall-Related Injury  Outcome: Ongoing, Progressing     Problem: Adult Inpatient Plan of Care  Goal: Absence of Hospital-Acquired Illness or Injury  Outcome: Ongoing, Progressing     Problem: Adult Inpatient Plan of Care  Goal: Plan of Care Review  Outcome: Ongoing, Progressing     Problem: Adult Inpatient Plan of Care  Goal: Rounds/Family Conference  Outcome: Ongoing, Progressing

## 2020-11-17 NOTE — ASSESSMENT & PLAN NOTE
She has chronic ETOH mood disorder treated with:   -Trazadone 100mg po qhs   -Fluoxetine 60mg po qday   -Wellbutrin 100mg po qday  In the past 7 weeks, she has only had one bloody garrison

## 2020-11-17 NOTE — HPI
"Ms. Chu is a 61yo lady with a past medical history of ETOH abuse, HTN, gastric bypass and psychiatric issues related to mood disorder from ETOH abuse.  She was recently discharged from rehab on 11/5/20 after a 5 week stay due to alcohol abuse related leg weakness (she could not walk).     She did well after the stint in rehab until this past Saturday when she developed the sudden onset of dizziness.  Despite feeling dizzy, she decided to go up the stairs.  Unfortunately she made it to the 6th of 12 steps, then lost her balance and tumbled down the hardwood steps to the tiled floor below.  She struck her right should and head.  She states she did not pass out before or after the fall (did not lose consciousness).    She did, however, immediately have right shoulder pain, so she called 911.  They came and recommended that she go to the ED for evaluation.  She refused, stayed home, and decided to self medicate the shoulder pain with, "a few bloody yosvany."  She tells me, "these did nothing for the pain, so I finally decided to come in."    Her only other major complaint is nausea due to the pain.  "

## 2020-11-17 NOTE — ASSESSMENT & PLAN NOTE
Ms. Chu fell down 6 steps after becoming dizzy at home this past Saturday  She never lost consciousness  She states that she did strike her head but was fine afterwards  CT head not indicated in minor head trauma with normal mental status   -Trauma was also almost 3 days ago

## 2020-11-17 NOTE — PLAN OF CARE
AO x4, NAD. Up to chair x 1 assist. Medicated for pain with prn IV dilaudid and oxy IR. VS stable. UP with PT and OT.     Problem: Fall Injury Risk  Goal: Absence of Fall and Fall-Related Injury  Outcome: Ongoing, Progressing     Problem: Adult Inpatient Plan of Care  Goal: Plan of Care Review  Outcome: Ongoing, Progressing  Goal: Patient-Specific Goal (Individualization)  Outcome: Ongoing, Progressing  Goal: Absence of Hospital-Acquired Illness or Injury  Outcome: Ongoing, Progressing  Goal: Optimal Comfort and Wellbeing  Outcome: Ongoing, Progressing  Goal: Readiness for Transition of Care  Outcome: Ongoing, Progressing  Goal: Rounds/Family Conference  Outcome: Ongoing, Progressing     Problem: Wound  Goal: Optimal Wound Healing  Outcome: Ongoing, Progressing     Problem: Infection  Goal: Infection Symptom Resolution  Outcome: Ongoing, Progressing     Problem: Skin Injury Risk Increased  Goal: Skin Health and Integrity  Outcome: Ongoing, Progressing

## 2020-11-17 NOTE — ASSESSMENT & PLAN NOTE
The computer is reading out her QTc to be in the 600's  On manual calculation using Bazett's, her QTc is consistently around 400

## 2020-11-17 NOTE — ASSESSMENT & PLAN NOTE
CT of the right shoulder revealed:  -There is a nondisplaced fracture of the greater tuberosity.    -There is a remote fracture involving the distal clavicle, which is healed.    -There is no shoulder dislocation.   PT and OT consult  Ortho consult  Sling  Pain control

## 2020-11-17 NOTE — PLAN OF CARE
Problem: Occupational Therapy Goal  Goal: Occupational Therapy Goal  Description: Goals to be met by: 12/1/2020    Patient will increase functional independence with ADLs by performing:    UE Dressing with Modified [place].  LE Dressing with Modified [place].  Grooming while standing at sink with Modified [place].  Toileting from toilet with Modified [place] for hygiene and clothing management.   Supine to sit with Modified [place].  Step transfer with Modified [place]  Toilet transfer to toilet with Modified [place].  Upper extremity exercise program x15 reps per handout, with independence.  LUE AROM/AAROM/PROM exercise program x15 reps w/ assistance as needed.     Outcome: Ongoing, Progressing    Pt pleasant and willing to participate in eval w/ OT and PT this date. Pt w/ reports of increased pain in R shoulder upon entry. Pt able to perform bed mobility w/ SBA to sit EOB and performed sit>stand w/ CGA and no AD. Pt ambulated a short household from EOB to toilet w/ CGA and no AD; pt w/ unsteady gait but no LOB noted throughout. Pt noted w/ grabbing walls and bed rails for stability. Pt performed toileting w/ CGA-SBA and stood at sink w/ SBA to perform hand hygiene. Pt was then given walker to ambulate to recliner chair and was noted w/ increased balance and stability. Pt tolerated eval well and will continue to benefit from skilled acute OT services to increase strength, balance and RUE ROM for optimal functional performance w/ ADLs and functional mobility.

## 2020-11-17 NOTE — PLAN OF CARE
11/17/20 1705   Discharge Assessment   Assessment Type Discharge Planning Assessment   1545:  TN to patient's bedside.  PT at bedside working with patient.  TN to follow up at a later time.

## 2020-11-17 NOTE — H&P
"[facility] Medicine  History & Physical    Patient Name: Alicia Chu  MRN: [id number]  Admission Date: 11/16/2020  Attending Physician: Bandar Ferguson MD   Primary Care Provider: Primary Doctor No         Patient information was obtained from patient, past medical records and ER records.     Subjective:     Principal Problem:Fracture of right shoulder girdle, part unspecified, initial encounter for open fracture    Chief Complaint:   Chief Complaint   Patient presents with    Fall     pt comes in via EMS from home with c/o fall today and saturday. She c/o R shoulder and chest pain associated with fall. She states she hit her head but denies LOC or use of blood thinners.         HPI: Ms. Chu is a 61yo lady with a past medical history of ETOH abuse, HTN, gastric bypass and psychiatric issues related to mood disorder from ETOH abuse.  She was recently discharged from rehab on 11/5/20 after a 5 week stay due to alcohol abuse related leg weakness (she could not walk).     She did well after the stint in rehab until this past Saturday when she developed the sudden onset of dizziness.  Despite feeling dizzy, she decided to go up the stairs.  Unfortunately she made it to the 6th of 12 steps, then lost her balance and tumbled down the hardwood steps to the tiled floor below.  She struck her right should and head.  She states she did not pass out before or after the fall (did not lose consciousness).    She did, however, immediately have right shoulder pain, so she called 911.  They came and recommended that she go to the ED for evaluation.  She refused, stayed home, and decided to self medicate the shoulder pain with, "a few bloody yosvany."  She tells me, "these did nothing for the pain, so I finally decided to come in."    Her only other major complaint is nausea due to the pain.    Past Medical History:   Diagnosis Date    Addiction to drug     Alcohol abuse     Colon polyps     " "Gastric bypass status for obesity 2006    GIB (gastrointestinal bleeding)     History of psychiatric hospitalization     Hx of psychiatric care     Hypertension     Postmenopausal HRT (hormone replacement therapy)     Psychiatric problem     Therapy     Ulcer        Past Surgical History:   Procedure Laterality Date    ABDOMINAL SURGERY      BREAST BIOPSY  2005    right    CHOLECYSTECTOMY      ESOPHAGOGASTRODUODENOSCOPY N/A 4/18/2019    Procedure: EGD (ESOPHAGOGASTRODUODENOSCOPY);  Surgeon: Mony Plunkett MD;  Location: [facility] ENDO;  Service: Endoscopy;  Laterality: N/A;    ESOPHAGOGASTRODUODENOSCOPY N/A 8/4/2020    Procedure: EGD (ESOPHAGOGASTRODUODENOSCOPY);  Surgeon: Jose F Oreilly MD;  Location: [facility] ENDO;  Service: Endoscopy;  Laterality: N/A;    GASTRIC BYPASS  2006    zackary en Y    HERNIA REPAIR  x2    LI hernia    HERNIA REPAIR      INFECTED SKIN DEBRIDEMENT  x3    KNEE ARTHROSCOPY W/ DEBRIDEMENT      right       Review of patient's allergies indicates:   Allergen Reactions    Aspirin      Due to gastric bypass    Ibuprofen      Other reaction(s): Due To Gastric Bypass  Due to gastric bypass    Inderal [propranolol]      "Felt bizarre"       No current facility-administered medications on file prior to encounter.      Current Outpatient Medications on File Prior to Encounter   Medication Sig    buPROPion (WELLBUTRIN) 100 MG tablet Take 1 tablet (100 mg total) by mouth once daily.    folic acid (FOLVITE) 1 MG tablet Take 1 tablet (1 mg total) by mouth once daily.    gabapentin (NEURONTIN) 100 MG capsule Take 100 mg by mouth 3 (three) times daily.    hydrOXYzine pamoate (VISTARIL) 25 MG Cap     multivitamin Tab Take 1 tablet by mouth once daily.    thiamine 100 MG tablet Take 1 tablet (100 mg total) by mouth once daily.    trazodone HCl (TRAZODONE ORAL)     chlordiazepoxide (LIBRIUM) 25 MG Cap Take 2 capsules (50 mg total) by mouth 4 (four) times daily for 1 day, THEN 1 capsule (25 mg " total) 4 (four) times daily for 1 day, THEN 1 capsule (25 mg total) every 12 (twelve) hours for 1 day, THEN 1 capsule (25 mg total) nightly for 1 day.    FLUoxetine 20 MG capsule Take 3 capsules (60 mg total) by mouth once daily. Also taking Fluoxetine 40 mg QAM    pantoprazole (PROTONIX) 40 MG tablet Take 1 tablet (40 mg total) by mouth 2 (two) times daily.     Family History     Problem Relation (Age of Onset)    Alzheimer's disease Father (78)    Breast cancer Maternal Uncle    Cancer Mother, Paternal Grandfather    Dementia Father    Hyperlipidemia Father    Hypertension Father        Tobacco Use    Smoking status: Never Smoker    Smokeless tobacco: Never Used   Substance and Sexual Activity    Alcohol use: Yes     Alcohol/week: 0.0 standard drinks     Comment: 1 small bottle wine daily (10oz) + 4 strong vodka cocktails daily    Drug use: No    Sexual activity: Yes     Partners: Male     Birth control/protection: None     Review of Systems   Constitutional: Positive for activity change and appetite change. Negative for chills, fatigue and fever.   HENT: Negative for congestion.    Eyes: Negative for photophobia.   Respiratory: Negative for cough and shortness of breath.    Cardiovascular: Negative for chest pain.   Gastrointestinal: Positive for nausea. Negative for abdominal pain, constipation, diarrhea and vomiting.   Endocrine: Negative for heat intolerance.   Genitourinary: Negative for dysuria.   Musculoskeletal: Positive for arthralgias and gait problem.   Skin: Negative for rash.   Allergic/Immunologic: Negative for immunocompromised state.   Neurological: Positive for light-headedness. Negative for dizziness and weakness.   Hematological: Does not bruise/bleed easily.   Psychiatric/Behavioral: Positive for dysphoric mood. Negative for confusion. The patient is not nervous/anxious.      Objective:     Vital Signs (Most Recent):  Temp: 98 °F (36.7 °C) (11/16/20 2236)  Pulse: 89 (11/16/20  2236)  Resp: 19 (11/17/20 0021)  BP: 135/82 (11/16/20 2236)  SpO2: 98 % (11/16/20 2236) Vital Signs (24h Range):  Temp:  [98 °F (36.7 °C)-98.3 °F (36.8 °C)] 98 °F (36.7 °C)  Pulse:  [82-94] 89  Resp:  [17-19] 19  SpO2:  [98 %-100 %] 98 %  BP: (134-154)/(82-91) 135/82     Weight: 70.3 kg (155 lb)  Body mass index is 25.79 kg/m².    Physical Exam  Vitals signs and nursing note reviewed.   Constitutional:       General: She is not in acute distress.     Appearance: She is well-developed. She is not ill-appearing, toxic-appearing or diaphoretic.   HENT:      Head: Normocephalic and atraumatic.      Mouth/Throat:      Pharynx: No oropharyngeal exudate.   Eyes:      General: No scleral icterus.        Right eye: No discharge.         Left eye: No discharge.      Conjunctiva/sclera: Conjunctivae normal.      Pupils: Pupils are equal, round, and reactive to light.   Neck:      Musculoskeletal: Normal range of motion and neck supple.      Thyroid: No thyromegaly.      Vascular: No JVD.      Trachea: No tracheal deviation.   Cardiovascular:      Rate and Rhythm: Normal rate and regular rhythm.      Heart sounds: Normal heart sounds. No murmur. No friction rub. No gallop.    Pulmonary:      Effort: Pulmonary effort is normal. No respiratory distress.      Breath sounds: Normal breath sounds. No stridor. No decreased breath sounds, wheezing, rhonchi or rales.   Chest:      Chest wall: No tenderness.   Abdominal:      General: Bowel sounds are normal. There is no distension.      Palpations: Abdomen is soft. There is no mass.      Tenderness: There is no abdominal tenderness. There is no guarding or rebound.   Genitourinary:     Comments: No singh  Musculoskeletal:      Right shoulder: She exhibits decreased range of motion, tenderness, bony tenderness and swelling.   Lymphadenopathy:      Cervical: No cervical adenopathy.      Comments: No peripheral edema   Skin:     General: Skin is warm and dry.      Coloration: Skin is not  pale.      Findings: No erythema or rash.   Neurological:      Mental Status: She is alert and oriented to person, place, and time.      GCS: GCS eye subscore is 4. GCS verbal subscore is 5. GCS motor subscore is 6.      Cranial Nerves: No cranial nerve deficit.      Motor: No abnormal muscle tone.      Coordination: Coordination normal.   Psychiatric:         Mood and Affect: Mood is depressed. Affect is flat.         Behavior: Behavior normal.         Thought Content: Thought content normal.         Cognition and Memory: Cognition and memory normal.         Judgment: Judgment normal.           CRANIAL NERVES     CN III, IV, VI   Pupils are equal, round, and reactive to light.       Significant Labs:   Recent Results (from the past 24 hour(s))   CBC auto differential    Collection Time: 11/16/20  7:32 PM   Result Value Ref Range    WBC 8.02 3.90 - 12.70 K/uL    RBC 4.23 4.00 - 5.40 M/uL    Hemoglobin 12.6 12.0 - 16.0 g/dL    Hematocrit 37.7 37.0 - 48.5 %    MCV 89 82 - 98 fL    MCH 29.8 27.0 - 31.0 pg    MCHC 33.4 32.0 - 36.0 g/dL    RDW 16.7 (H) 11.5 - 14.5 %    Platelets 265 150 - 350 K/uL    MPV 12.0 9.2 - 12.9 fL    Immature Granulocytes 0.4 0.0 - 0.5 %    Gran # (ANC) 6.0 1.8 - 7.7 K/uL    Immature Grans (Abs) 0.03 0.00 - 0.04 K/uL    Lymph # 1.5 1.0 - 4.8 K/uL    Mono # 0.5 0.3 - 1.0 K/uL    Eos # 0.0 0.0 - 0.5 K/uL    Baso # 0.05 0.00 - 0.20 K/uL    nRBC 0 0 /100 WBC    Gran % 74.2 (H) 38.0 - 73.0 %    Lymph % 19.0 18.0 - 48.0 %    Mono % 5.7 4.0 - 15.0 %    Eosinophil % 0.1 0.0 - 8.0 %    Basophil % 0.6 0.0 - 1.9 %    Differential Method Automated    Comprehensive metabolic panel    Collection Time: 11/16/20  7:32 PM   Result Value Ref Range    Sodium 136 136 - 145 mmol/L    Potassium 3.8 3.5 - 5.1 mmol/L    Chloride 101 95 - 110 mmol/L    CO2 15 (L) 23 - 29 mmol/L    Glucose 110 70 - 110 mg/dL    BUN 7 6 - 20 mg/dL    Creatinine 0.8 0.5 - 1.4 mg/dL    Calcium 8.5 (L) 8.7 - 10.5 mg/dL    Total Protein 7.1  6.0 - 8.4 g/dL    Albumin 3.1 (L) 3.5 - 5.2 g/dL    Total Bilirubin 0.6 0.1 - 1.0 mg/dL    Alkaline Phosphatase 216 (H) 55 - 135 U/L    AST 88 (H) 10 - 40 U/L    ALT 41 10 - 44 U/L    Anion Gap 20 (H) 8 - 16 mmol/L    eGFR if African American >60 >60 mL/min/1.73 m^2    eGFR if non African American >60 >60 mL/min/1.73 m^2   Troponin I    Collection Time: 11/16/20  7:32 PM   Result Value Ref Range    Troponin I 0.017 0.000 - 0.026 ng/mL   Magnesium    Collection Time: 11/16/20  7:32 PM   Result Value Ref Range    Magnesium 1.5 (L) 1.6 - 2.6 mg/dL   ISTAT PROCEDURE    Collection Time: 11/16/20  8:34 PM   Result Value Ref Range    POC Glucose 112 (H) 70 - 110 mg/dL    POC BUN 6 6 - 30 mg/dL    POC Creatinine 0.9 0.5 - 1.4 mg/dL    POC Sodium 138 136 - 145 mmol/L    POC Potassium 2.8 (LL) 3.5 - 5.1 mmol/L    POC Chloride 99 95 - 110 mmol/L    POC TCO2 (MEASURED) 18 (L) 23 - 29 mmol/L    POC Anion Gap 26 (H) 8 - 16 mmol/L    POC Ionized Calcium 1.07 1.06 - 1.42 mmol/L    POC Hematocrit 35 (L) 36 - 54 %PCV    Sample VENOUS    POCT COVID-19 Rapid Screening    Collection Time: 11/16/20  9:11 PM   Result Value Ref Range    POC Rapid COVID Negative Negative     Acceptable Yes    Basic Metabolic Panel    Collection Time: 11/16/20 11:41 PM   Result Value Ref Range    Sodium 136 136 - 145 mmol/L    Potassium 3.8 3.5 - 5.1 mmol/L    Chloride 104 95 - 110 mmol/L    CO2 16 (L) 23 - 29 mmol/L    Glucose 108 70 - 110 mg/dL    BUN 8 6 - 20 mg/dL    Creatinine 0.7 0.5 - 1.4 mg/dL    Calcium 7.9 (L) 8.7 - 10.5 mg/dL    Anion Gap 16 8 - 16 mmol/L    eGFR if African American >60 >60 mL/min/1.73 m^2    eGFR if non African American >60 >60 mL/min/1.73 m^2   Magnesium    Collection Time: 11/16/20 11:41 PM   Result Value Ref Range    Magnesium 2.1 1.6 - 2.6 mg/dL     Significant Imaging:   XR CHEST PA AND LATERAL  FINDINGS:  Cardiac silhouette and pulmonary vascularity are within normal limits.  Lungs are symmetrically expanded  and show no evidence of significant focal consolidation, large effusion or pneumothorax.  Bones demonstrated a remote right clavicular fracture.  No evidence of acute fracture.  There are cholecystectomy clips.     Impression:   No acute radiographic findings in the chest.      Electronically signed by: Thiago Smiley MD  Date:                                            11/16/2020  Time:                                           17:13    XR SHOULDER COMPLETE 2 OR MORE VIEWS RIGHT  FINDINGS:  There is a remote fracture of the right distal clavicle.  There are degenerative changes of the acromioclavicular joint.  There is no evidence of acute fracture, dislocation or bone destruction.  Surrounding soft tissue structures show no acute abnormalities.     Impression:   No acute bony abnormalities.  Remote distal clavicular fracture.      Electronically signed by: Thiago Smiley MD  Date:                                            11/16/2020  Time:                                           17:18    16-NOV-2020 18:05:24 EKG   Normal sinus rhythm  Vent. rate 87 BPM  ND interval 138 ms  QRS duration 84 ms   QTc 390 ms (Manual calculation by Bazett measurement)   QTc  534 ms (EKG computer calculation)  Prolonged QT  Abnormal ECG  When compared with ECG of 03-SEP-2020 13:11,  Significant changes have occurred    16-NOV-2020 20:38:37 EKG    Normal sinus rhythm  Vent. rate 88 BPM  ND interval 130 ms  QRS duration 84 ms   QTc  412 ms (manual calculation by Bazett measurement)    QTc 634 ms (EKG computer calculation)  Prolonged QT  Abnormal ECG  When compared with ECG of 16-NOV-2020 18:05,  Significant changes have occurred      Assessment/Plan:     * Fracture of right shoulder girdle, part unspecified, initial encounter for open fracture  CT of the right shoulder revealed:  -There is a nondisplaced fracture of the greater tuberosity.    -There is a remote fracture involving the distal clavicle, which is healed.    -There is no  shoulder dislocation.   PT and OT consult  Ortho consult  Sling  Pain control      Fall down stairs  Ms. Chu fell down 6 steps after becoming dizzy at home this past Saturday  She never lost consciousness  She states that she did strike her head but was fine afterwards  CT head not indicated in minor head trauma with normal mental status   -Trauma was also almost 3 days ago      Hypokalemia  She had an Istat of 2.8, was given 60 meq KCl PO  Her repeat K was 3.8      Hypomagnesemia  Her Mg was 1.5 on arrival, increased to 2.1 after 2g iv MgSO4      Prolonged QT interval  The computer is reading out her QTc to be in the 600's  On manual calculation using Bazett's, her QTc is consistently around 400      Alcoholism  She has chronic ETOH mood disorder treated with:   -Trazadone 100mg po qhs   -Fluoxetine 60mg po qday   -Wellbutrin 100mg po qday  In the past 7 weeks, she has only had one bloody garrison        VTE Risk Mitigation (From admission, onward)         Ordered     Place SENAIT hose  Until discontinued      11/16/20 2318     Place sequential compression device  Until discontinued      11/16/20 2318     IP VTE LOW RISK PATIENT  Once      11/16/20 2318                   CHINMAY St MD  Department of Hospital Medicine   [facility]

## 2020-11-17 NOTE — PT/OT/SLP EVAL
Occupational Therapy   Evaluation    Name: Alicia Chu  MRN: [id number]  Admitting Diagnosis:  Fracture of right shoulder girdle, part unspecified, initial encounter for open fracture      Recommendations:     Discharge Recommendations: outpatient OT  Discharge Equipment Recommendations:  shower chair, walker, rolling, bedside commode (Pt in need of following AD and DME to reduce fall risk once D/C home; pt w/ decreased balance and safety awareness.)  Barriers to discharge:  (Pt w/ increased fall risk.); pt w/ decreased caregiver support    Assessment:     Alicia Chu is a 60 y.o. female with a medical diagnosis of Fracture of right shoulder girdle, part unspecified, initial encounter for open fracture. Performance deficits affecting function: weakness, impaired endurance, impaired self care skills, impaired functional mobilty, gait instability, impaired balance, decreased upper extremity function, pain, decreased ROM, impaired fine motor, decreased safety awareness.      Pt pleasant and willing to participate in eval w/ OT and PT this date. Pt w/ reports of increased pain in R shoulder upon entry. Pt able to perform bed mobility w/ SBA to sit EOB and performed sit>stand w/ CGA and no AD. Pt ambulated a short household from EOB to toilet w/ CGA and no AD; pt w/ unsteady gait but no LOB noted throughout. Pt noted w/ grabbing walls and bed rails for stability. Pt performed toileting w/ CGA-SBA and stood at sink w/ SBA to perform hand hygiene. Pt was then given walker to ambulate to recliner chair and was noted w/ increased balance and stability. Pt tolerated eval well and will continue to benefit from skilled acute OT services to increase strength, balance and RUE ROM for optimal functional performance w/ ADLs and functional mobility.     Rehab Prognosis: Good; patient would benefit from acute skilled OT services to address these deficits and reach maximum level of function.       Plan:     Patient to be seen 5  "x/week to address the above listed problems via self-care/home management, therapeutic activities, therapeutic exercises  · Plan of Care Expires: 12/01/20  · Plan of Care Reviewed with: patient    Subjective     Chief Complaint: pain 8/10 in R shoulder  Patient/Family Comments/goals: safely return home     Occupational Profile:  Pt reports that once she was D/C from [facility] in September, she was hospitalized, again, at [facility] in October and was then D/C to [facility]. Pt then D/C home in November where she wanted to join an alcohol rehab program but was unable to due to the program telling her that "she was too weak."   Living Environment: Pt lives w/ spouse in 2SH w/ 0 STEPHANIE; pt reports her bedroom and bath are on the 2nd level; pt has ~12 steps w/ BHR.   Previous level of function: Pt was mod I w/ ADLs and functional mobility w/ access to standard walker and rollator.   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   Equipment Used at Home:  walker, standard, rollator  Assistance upon Discharge: Pt may have assistance from neighbor or extended family; pt w/ limited family support.     Pain/Comfort:  Pain Rating 1: 8/10  Location - Side 1: Right  Location 1: shoulder  Pain Addressed 1: Pre-medicate for activity, Distraction, Cessation of Activity  Pain Rating Post-Intervention 1: 8/10    Patients cultural, spiritual, Yazdanism conflicts given the current situation: no    Objective:     Communicated with: nurse prior to session.  Patient found HOB elevated with telemetry, bed alarm, peripheral IV upon OT entry to room.    General Precautions: Standard, fall   Orthopedic Precautions:N/A   Braces: N/A     Occupational Performance:    Bed Mobility:    · Patient completed Scooting/Bridging with stand by assistance  · Patient completed Supine to Sit with stand by assistance    Functional Mobility/Transfers:  · Patient completed Sit <> Stand Transfer with contact guard assistance  with  no assistive device " and rolling walker  · Pt ambulated from EOB to toilet w/ CGA and no AD; pt noted w/ grabbing furniture for stability   · Pt then ambulated from sink to chair w/ CGA-SBA and use of RW; pt w/ improved performance throughout   · Patient completed Bed <> Chair Transfer using Step Transfer technique with contact guard assistance with rolling walker  · Functional Mobility: Pt ambulated from EOB>toilet w/ CGA and no AD. Pt w/ decreased balance and holding onto walls/furniture; pt then ambulated from sink to chair w/ CGA and use of RW. No LOB noted.     Activities of Daily Living:  · Upper Body Dressing: minimum assistance for donning gown while seated   · Lower Body Dressing: moderate assistance for fastening brief  · Toileting: contact guard assistance and minimum assistance for pulling briefs up over hips while standing; pt noted w/ minimally using RUE to assist w/ pulling briefs up     Cognitive/Visual Perceptual:  Cognitive/Psychosocial Skills:     -       Oriented to: Person, Place, Time and Situation   -       Follows Commands/attention:Follows two-step commands  -       Communication: clear/fluent  -       Memory: No Deficits noted  -       Safety awareness/insight to disability: impaired     Physical Exam:  Balance:    -       good sitting balance; fair + standing balance  Postural examination/scapula alignment:    -       Rounded shoulders  -       Forward head  Skin integrity: Bruising of middle back  Sensation:    -       Intact  light/touch BUEs  Dominant hand:  Right  Upper Extremity Range of Motion:     -       Right Upper Extremity: Deficits: pt able to tolerate ~90* of passive shoulder flexion ROM 2* pain; pt able to achieve >90* of active shoulder flexion   -       Left Upper Extremity: WFL  Upper Extremity Strength:    -       Right Upper Extremity: unable to formally assess shoulder flexion 2* to pain; pt's elbow flex/ext grossly 3+/5;however, pt noted w/ functional use of RUE for support when scooting  to EOB  -       Left Upper Extremity: WFL   Strength:    -       Right Upper Extremity: fair   -       Left Upper Extremity: good  Fine Motor Coordination:    -       Intact  Left hand thumb/finger opposition skills and Right hand thumb/finger opposition skills    AMPAC 6 Click ADL:  AMPAC Total Score: 20    Treatment & Education:  -Pt educated on role of OT and POC.   -Pt educated on safe functional mobility w/ use of RW.   -Pt encouraged to perform AAROM to the RUE to promote ROM and functional use being as though pt is RUE dominant. According to MD orders, OT is to follow progressive mobility protocol for 2 x per day.   -Pt educated on dressing RUE FIRST when performing UB dressing.   -ADLs performed noted above.   -Pt instructed to call nursing prior to OOB activity.    Education:    Patient left up in chair with all lines intact and call button in reach    GOALS:   Multidisciplinary Problems     Occupational Therapy Goals        Problem: Occupational Therapy Goal    Goal Priority Disciplines Outcome Interventions   Occupational Therapy Goal     OT, PT/OT Ongoing, Progressing    Description: Goals to be met by: 12/1/2020    Patient will increase functional independence with ADLs by performing:    UE Dressing with Modified [place].  LE Dressing with Modified [place].  Grooming while standing at sink with Modified [place].  Toileting from toilet with Modified [place] for hygiene and clothing management.   Supine to sit with Modified [place].  Step transfer with Modified [place]  Toilet transfer to toilet with Modified [place].  Upper extremity exercise program x15 reps per handout, with independence.  LUE AROM/AAROM/PROM exercise program x15 reps w/ assistance as needed.                      History:     Past Medical History:   Diagnosis Date    Addiction to drug     Alcohol abuse     Colon polyps     Gastric bypass status for obesity 2006    GIB (gastrointestinal  bleeding)     History of psychiatric hospitalization     Hx of psychiatric care     Hypertension     Postmenopausal HRT (hormone replacement therapy)     Psychiatric problem     Therapy     Ulcer          Past Surgical History:   Procedure Laterality Date    ABDOMINAL SURGERY      BREAST BIOPSY  2005    right    CHOLECYSTECTOMY      ESOPHAGOGASTRODUODENOSCOPY N/A 4/18/2019    Procedure: EGD (ESOPHAGOGASTRODUODENOSCOPY);  Surgeon: Mony Plunkett MD;  Location: [facility] ENDO;  Service: Endoscopy;  Laterality: N/A;    ESOPHAGOGASTRODUODENOSCOPY N/A 8/4/2020    Procedure: EGD (ESOPHAGOGASTRODUODENOSCOPY);  Surgeon: Jose F Oreilly MD;  Location: [facility] ENDO;  Service: Endoscopy;  Laterality: N/A;    GASTRIC BYPASS  2006    zackary en Y    HERNIA REPAIR  x2    LI hernia    HERNIA REPAIR      INFECTED SKIN DEBRIDEMENT  x3    KNEE ARTHROSCOPY W/ DEBRIDEMENT      right       Time Tracking:     OT Date of Treatment: 11/17/20  OT Start Time: 1458  OT Stop Time: 1515  OT Total Time (min): 17 min    Billable Minutes:Evaluation 17  Total Time 17 (co-eval w/ PT)    Rell Garcia OT  11/17/2020

## 2020-11-17 NOTE — NURSING
Patient refused TEDs and SCDs on admit. Complained of pain and was beginning to shake and tremble uncontrollably. Dr. St notified orders notified and carried out. Patient not resting quietly - will continue to monitor. No shaking or trembling at this time.

## 2020-11-17 NOTE — PLAN OF CARE
Problem: Physical Therapy Goal  Goal: Physical Therapy Goal  Outcome: Ongoing, Progressing   Initial PT evaluation performed.  Pt could benefit from skilled PT services 3-5x/wk in order to maximize function prior to D/C.  PT at next level recommended.

## 2020-11-18 LAB
ANION GAP SERPL CALC-SCNC: 8 MMOL/L (ref 8–16)
BASOPHILS # BLD AUTO: 0.03 K/UL (ref 0–0.2)
BASOPHILS NFR BLD: 0.8 % (ref 0–1.9)
BUN SERPL-MCNC: 6 MG/DL (ref 6–20)
CALCIUM SERPL-MCNC: 7.7 MG/DL (ref 8.7–10.5)
CHLORIDE SERPL-SCNC: 107 MMOL/L (ref 95–110)
CO2 SERPL-SCNC: 23 MMOL/L (ref 23–29)
CREAT SERPL-MCNC: 0.7 MG/DL (ref 0.5–1.4)
DIFFERENTIAL METHOD: ABNORMAL
EOSINOPHIL # BLD AUTO: 0.2 K/UL (ref 0–0.5)
EOSINOPHIL NFR BLD: 5 % (ref 0–8)
ERYTHROCYTE [DISTWIDTH] IN BLOOD BY AUTOMATED COUNT: 16.9 % (ref 11.5–14.5)
EST. GFR  (AFRICAN AMERICAN): >60 ML/MIN/1.73 M^2
EST. GFR  (NON AFRICAN AMERICAN): >60 ML/MIN/1.73 M^2
GLUCOSE SERPL-MCNC: 120 MG/DL (ref 70–110)
HCT VFR BLD AUTO: 31.9 % (ref 37–48.5)
HGB BLD-MCNC: 9.8 G/DL (ref 12–16)
IMM GRANULOCYTES # BLD AUTO: 0.01 K/UL (ref 0–0.04)
IMM GRANULOCYTES NFR BLD AUTO: 0.3 % (ref 0–0.5)
LYMPHOCYTES # BLD AUTO: 1.2 K/UL (ref 1–4.8)
LYMPHOCYTES NFR BLD: 32.5 % (ref 18–48)
MAGNESIUM SERPL-MCNC: 1.8 MG/DL (ref 1.6–2.6)
MCH RBC QN AUTO: 29.8 PG (ref 27–31)
MCHC RBC AUTO-ENTMCNC: 30.7 G/DL (ref 32–36)
MCV RBC AUTO: 97 FL (ref 82–98)
MONOCYTES # BLD AUTO: 0.3 K/UL (ref 0.3–1)
MONOCYTES NFR BLD: 8.6 % (ref 4–15)
NEUTROPHILS # BLD AUTO: 2 K/UL (ref 1.8–7.7)
NEUTROPHILS NFR BLD: 52.8 % (ref 38–73)
NRBC BLD-RTO: 0 /100 WBC
PHOSPHATE SERPL-MCNC: 2.8 MG/DL (ref 2.7–4.5)
PLATELET # BLD AUTO: 155 K/UL (ref 150–350)
PMV BLD AUTO: 11.9 FL (ref 9.2–12.9)
POTASSIUM SERPL-SCNC: 3.9 MMOL/L (ref 3.5–5.1)
RBC # BLD AUTO: 3.29 M/UL (ref 4–5.4)
SODIUM SERPL-SCNC: 138 MMOL/L (ref 136–145)
WBC # BLD AUTO: 3.82 K/UL (ref 3.9–12.7)

## 2020-11-18 PROCEDURE — 97116 GAIT TRAINING THERAPY: CPT

## 2020-11-18 PROCEDURE — 83735 ASSAY OF MAGNESIUM: CPT

## 2020-11-18 PROCEDURE — 97535 SELF CARE MNGMENT TRAINING: CPT

## 2020-11-18 PROCEDURE — 11000001 HC ACUTE MED/SURG PRIVATE ROOM

## 2020-11-18 PROCEDURE — 63600175 PHARM REV CODE 636 W HCPCS: Performed by: HOSPITALIST

## 2020-11-18 PROCEDURE — 25000003 PHARM REV CODE 250: Performed by: INTERNAL MEDICINE

## 2020-11-18 PROCEDURE — 84100 ASSAY OF PHOSPHORUS: CPT

## 2020-11-18 PROCEDURE — 80048 BASIC METABOLIC PNL TOTAL CA: CPT

## 2020-11-18 PROCEDURE — 25000003 PHARM REV CODE 250: Performed by: HOSPITALIST

## 2020-11-18 PROCEDURE — 97110 THERAPEUTIC EXERCISES: CPT

## 2020-11-18 PROCEDURE — 36415 COLL VENOUS BLD VENIPUNCTURE: CPT

## 2020-11-18 PROCEDURE — 85025 COMPLETE CBC W/AUTO DIFF WBC: CPT

## 2020-11-18 RX ORDER — OXYCODONE HYDROCHLORIDE 5 MG/1
5 TABLET ORAL EVERY 6 HOURS PRN
Qty: 20 TABLET | Refills: 0 | Status: SHIPPED | OUTPATIENT
Start: 2020-11-18 | End: 2020-11-20

## 2020-11-18 RX ORDER — ENOXAPARIN SODIUM 100 MG/ML
40 INJECTION SUBCUTANEOUS EVERY 24 HOURS
Status: DISCONTINUED | OUTPATIENT
Start: 2020-11-18 | End: 2020-11-20 | Stop reason: HOSPADM

## 2020-11-18 RX ADMIN — GABAPENTIN 100 MG: 100 CAPSULE ORAL at 08:11

## 2020-11-18 RX ADMIN — POTASSIUM PHOSPHATE, MONOBASIC 500 MG: 500 TABLET, SOLUBLE ORAL at 08:11

## 2020-11-18 RX ADMIN — GABAPENTIN 100 MG: 100 CAPSULE ORAL at 02:11

## 2020-11-18 RX ADMIN — PANTOPRAZOLE SODIUM 40 MG: 40 TABLET, DELAYED RELEASE ORAL at 08:11

## 2020-11-18 RX ADMIN — Medication 100 MG: at 08:11

## 2020-11-18 RX ADMIN — OXYCODONE 10 MG: 5 TABLET ORAL at 10:11

## 2020-11-18 RX ADMIN — FOLIC ACID 1 MG: 1 TABLET ORAL at 08:11

## 2020-11-18 RX ADMIN — FLUOXETINE 60 MG: 20 CAPSULE ORAL at 08:11

## 2020-11-18 RX ADMIN — ENOXAPARIN SODIUM 40 MG: 40 INJECTION SUBCUTANEOUS at 05:11

## 2020-11-18 RX ADMIN — THERA TABS 1 TABLET: TAB at 08:11

## 2020-11-18 RX ADMIN — TRAZODONE HYDROCHLORIDE 50 MG: 50 TABLET ORAL at 08:11

## 2020-11-18 RX ADMIN — HYDROMORPHONE HYDROCHLORIDE 1 MG: 2 INJECTION INTRAMUSCULAR; INTRAVENOUS; SUBCUTANEOUS at 06:11

## 2020-11-18 RX ADMIN — BUPROPION HYDROCHLORIDE 100 MG: 100 TABLET, FILM COATED ORAL at 08:11

## 2020-11-18 RX ADMIN — OXYCODONE 10 MG: 5 TABLET ORAL at 07:11

## 2020-11-18 RX ADMIN — OXYCODONE 10 MG: 5 TABLET ORAL at 02:11

## 2020-11-18 NOTE — PT/OT/SLP PROGRESS
Physical Therapy Treatment    Patient Name:  Alicia Chu   MRN:  [id number]    Recommendations:     Discharge Recommendations:  rehabilitation facility   Discharge Equipment Recommendations: shower chair, bedside commode, cane, straight   Barriers to discharge: Pt has 1 flight of stairs to her bedroom and bathroom upstairs.  PTA, pt was independent with ambulation and ADL's.  Pt currently NWB R UE and requires Min A for t/f's and ambulation and can only ambulate 12' with SPC.  Pt has fall Hx and currently is at increased risk for falls if she returns home at present time.  Pt could benefit from an aggressive Multidisciplinary approach found at inpatient rehab in order to help restore pt's functional independence     Assessment:     Alicia Chu is a 60 y.o. female admitted with a medical diagnosis of Fracture of right shoulder girdle, part unspecified, initial encounter for open fracture.  She presents with the following impairments/functional limitations:  weakness, impaired self care skills, impaired balance, decreased coordination, decreased safety awareness, decreased ROM, impaired endurance, impaired functional mobilty, decreased upper extremity function, pain, impaired coordination, gait instability, decreased lower extremity function Pt limited by pain, NWB R UE.    Rehab Prognosis: Good; patient would benefit from acute skilled PT services to address these deficits and reach maximum level of function.    Recent Surgery: * No surgery found *      Plan:     During this hospitalization, patient to be seen (5-6x/wk) to address the identified rehab impairments via gait training, therapeutic activities, therapeutic exercises and progress toward the following goals:    · Plan of Care Expires:  12/01/20    Subjective     Chief Complaint: pain, dizziness with ambulation, SOB  Patient/Family Comments/goals: Pt with difficulty reporting how she will perform tasks NWB R UE at home.  Patient states that she would have  to sleep on the couch if she went home right now    Pain/Comfort:  7/10 R shoulder    Objective:     Communicated with nsg prior to session.  Patient found HOB elevated with telemetry, peripheral IV, bed alarm upon PT entry to room.     General Precautions: Standard, fall   Orthopedic Precautions:RUE non weight bearing(Sling for comfort)   Braces: UE Sling     Functional Mobility:  · Bed Mobility:     · Scooting: stand by assistance and with VC for NWB R UE  · Supine to Sit: stand by assistance and with VC for NWB R UE and hand placement/safety with L UE   · Transfers:     · Sit to Stand:  Pt completed task on 4th trial with SBA, use of BR, and VC for NWB R UE with no AD  · Toilet Transfer: Failed attempt x 3 then required minimum assistance and VC for L hand placement on rail, forward weight shift over JUANI,  and NWB R UE with SPC using  Step Transfer  · Gait: Gait training 2x12' with SPC and VC/TC for cane management/safety and sequencing with SPC.  Pt ambulates with max increased stride width, SOB, tremors, decreased barry, indicating an high risk for falls.    · Balance: FAir+ sit, Fair stand      AM-PAC 6 CLICK MOBILITY  Turning over in bed (including adjusting bedclothes, sheets and blankets)?: 3  Sitting down on and standing up from a chair with arms (e.g., wheelchair, bedside commode, etc.): 3  Moving from lying on back to sitting on the side of the bed?: 3  Moving to and from a bed to a chair (including a wheelchair)?: 3  Need to walk in hospital room?: 3  Climbing 3-5 steps with a railing?: 2  Basic Mobility Total Score: 17       Therapeutic Activities and Exercises:   T/F and Gait training as above    Patient left seated at EOB with all lines intact, call button in reach and OT present..    GOALS:   Multidisciplinary Problems     Physical Therapy Goals        Problem: Physical Therapy Goal    Goal Priority Disciplines Outcome Goal Variances Interventions   Physical Therapy Goal     PT, PT/OT Ongoing,  Progressing     Description: Goals to be met by: 20     Patient will increase functional independence with mobility by performin. Sit to stand transfer with Modified [place]  2. Gait  x 150 feet with Modified [place] using Rolling Walker.   3. Lower extremity exercise program x10 reps per handout, with independence  4.10 stairs with HR and SBA                       Time Tracking:     PT Received On: 20  PT Start Time: 09     PT Stop Time: 1002  PT Total Time (min): 15 min     Billable Minutes: Gait Training 15    Treatment Type: Treatment  PT/PTA: PT     PTA Visit Number: 0     Audra Salas, PT  2020

## 2020-11-18 NOTE — CONSULTS
[facility]  Orthopedics  Consult Note    Patient Name: Alicia Chu  MRN: [id number]  Admission Date: 11/16/2020  Hospital Length of Stay: 1 days  Attending Provider: Ailyn Mccarthy MD  Primary Care Provider: Primary Doctor No    Patient information was obtained from patient and ER records.     Consults  Subjective:     Principal Problem:Fracture of right shoulder girdle, part unspecified, initial encounter for open fracture    Chief Complaint:   Chief Complaint   Patient presents with    Fall     pt comes in via EMS from home with c/o fall today and saturday. She c/o R shoulder and chest pain associated with fall. She states she hit her head but denies LOC or use of blood thinners.         HPI: Alicia is a 60 year old female who sustained a fall down her stairs Saturday night. She had pain to her right shoulder and various bumps and bruises. EMS was called and she was evaluated at home, but felt she did not need to come to the ED at that time. Her pain progressed, and she finally presented to the ED yesterday evening. CT of the right shoulder demonstrated a nondisplaced greater tuberosity fracture. Ortho consulted for evaluation.     Patient reports pain over the right shoulder, worse with any movement. She notes some pain in her chest, which she believes she fell on. She has mild soreness throughout the rest of her body. Denies fevers, chills, nausea, vomiting, diarrhea, dizziness, palpitations.    Past Medical History:   Diagnosis Date    Addiction to drug     Alcohol abuse     Colon polyps     Gastric bypass status for obesity 2006    GIB (gastrointestinal bleeding)     History of psychiatric hospitalization     Hx of psychiatric care     Hypertension     Postmenopausal HRT (hormone replacement therapy)     Psychiatric problem     Therapy     Ulcer        Past Surgical History:   Procedure Laterality Date    ABDOMINAL SURGERY      BREAST BIOPSY  2005    right     "CHOLECYSTECTOMY      ESOPHAGOGASTRODUODENOSCOPY N/A 4/18/2019    Procedure: EGD (ESOPHAGOGASTRODUODENOSCOPY);  Surgeon: Mony Plunkett MD;  Location: [facility] ENDO;  Service: Endoscopy;  Laterality: N/A;    ESOPHAGOGASTRODUODENOSCOPY N/A 8/4/2020    Procedure: EGD (ESOPHAGOGASTRODUODENOSCOPY);  Surgeon: Jose F Oreilly MD;  Location: [facility] ENDO;  Service: Endoscopy;  Laterality: N/A;    GASTRIC BYPASS  2006    zackary en Y    HERNIA REPAIR  x2    LI hernia    HERNIA REPAIR      INFECTED SKIN DEBRIDEMENT  x3    KNEE ARTHROSCOPY W/ DEBRIDEMENT      right       Review of patient's allergies indicates:   Allergen Reactions    Aspirin      Due to gastric bypass    Ibuprofen      Other reaction(s): Due To Gastric Bypass  Due to gastric bypass    Inderal [propranolol]      "Felt bizarre"       Current Facility-Administered Medications   Medication    acetaminophen tablet 650 mg    buPROPion tablet 100 mg    dextrose 5 % and 0.9 % NaCl with KCl 20 mEq infusion    dextrose 50% injection 12.5 g    dextrose 50% injection 25 g    FLUoxetine capsule 60 mg    folic acid tablet 1 mg    gabapentin capsule 100 mg    glucagon (human recombinant) injection 1 mg    glucose chewable tablet 16 g    glucose chewable tablet 24 g    hydromorphone (PF) injection 1 mg    melatonin tablet 6 mg    multivitamin tablet    oxyCODONE immediate release tablet 10 mg    oxyCODONE immediate release tablet 5 mg    pantoprazole EC tablet 40 mg    potassium phosphate (monobasic) tablet 500 mg    promethazine (PHENERGAN) 6.25 mg in dextrose 5 % 50 mL IVPB    promethazine tablet 25 mg    senna-docusate 8.6-50 mg per tablet 1 tablet    sodium chloride 0.9% flush 10 mL    thiamine tablet 100 mg    traZODone tablet 50 mg     Family History     Problem Relation (Age of Onset)    Alzheimer's disease Father (78)    Breast cancer Maternal Uncle    Cancer Mother, Paternal Grandfather    Dementia Father    Hyperlipidemia Father    Hypertension " "Father        Tobacco Use    Smoking status: Never Smoker    Smokeless tobacco: Never Used   Substance and Sexual Activity    Alcohol use: Yes     Alcohol/week: 0.0 standard drinks     Comment: 1 small bottle wine daily (10oz) + 4 strong vodka cocktails daily    Drug use: No    Sexual activity: Yes     Partners: Male     Birth control/protection: None     ROS  Objective:     Vital Signs (Most Recent):  Temp: 98.2 °F (36.8 °C) (11/17/20 1939)  Pulse: 77 (11/17/20 1939)  Resp: 16 (11/17/20 1939)  BP: 137/81 (11/17/20 1939)  SpO2: 99 % (11/17/20 1939) Vital Signs (24h Range):  Temp:  [97.3 °F (36.3 °C)-98.2 °F (36.8 °C)] 98.2 °F (36.8 °C)  Pulse:  [74-94] 77  Resp:  [16-19] 16  SpO2:  [98 %-100 %] 99 %  BP: (135-155)/(81-92) 137/81     Weight: 70.3 kg (155 lb)  Height: 5' 5" (165.1 cm)  Body mass index is 25.79 kg/m².      Intake/Output Summary (Last 24 hours) at 11/17/2020 1953  Last data filed at 11/17/2020 1721  Gross per 24 hour   Intake 720 ml   Output --   Net 720 ml       Ortho/SPM Exam     AAOx3. Nonlabored respirations. No acute distress.   RUE Exam: No significant swelling of the shoulder. No ecchymosis. No deformity. Tender to palpation somewhat diffusely over the shoulder, worse over the greater tuberosity. ROM of shoulder limited due to pain. Non-tender over the distal humerus, elbow, forearm, or wrist. Full ROM of the elbow and wrist without pain. Motor intact to AIN, PIN, ulnar nerves. SILT. Radial pulse palpable.   Other extremities with mild soreness, no particularly tender areas. Full ROM of all joints without pain. Motor and sensory intact.   No pain with compression of the pelvis.     Significant Labs: All pertinent labs within the past 24 hours have been reviewed.    Significant Imaging: CT right shoulder: nondisplaced fracture of the greater tuberosity     Assessment/Plan:     Active Diagnoses:    Diagnosis Date Noted POA    PRINCIPAL PROBLEM:  Fracture of right shoulder girdle, part " unspecified, initial encounter for open fracture [S42.91XB] 11/16/2020 Yes    Hypokalemia [E87.6] 11/17/2020 Yes    Hypomagnesemia [E83.42] 11/17/2020 Yes    Prolonged QT interval [R94.31] 11/17/2020 Yes    Fall down stairs [W10.8XXA] 11/17/2020 Yes    Alcoholism [F10.20] 11/17/2020 Yes      Problems Resolved During this Admission:    Diagnosis Date Noted Date Resolved POA    Fall [W19.XXXA] 11/17/2020 11/17/2020 Unknown    Essential hypertension [I10]  11/17/2020 Yes       Plan:   - Non operative management of right shoulder greater tuberosity fracture.   - Sling for comfort.   - NWB to right upper extremity. Pendulums to right shoulder. Ok for full motion of the elbow and wrist.   - Ok to discharge from ortho perspective. Follow up in clinic after discharge.     Josy Lee MD  Orthopedics  [facility]

## 2020-11-18 NOTE — CONSULTS
CM referral received for inpatient rehab; TN in to speak to patient; stated that she is in agreement to going to Rehab; was previously at [facility] but is opened to other facilities as well; referrals sent via [facility]; pending review/acceptance

## 2020-11-18 NOTE — PLAN OF CARE
Problem: Physical Therapy Goal  Goal: Physical Therapy Goal  Description: Goals to be met by: 20     Patient will increase functional independence with mobility by performin. Sit to stand transfer with Modified [place]  2. Gait  x 150 feet with Modified [place] using Rolling Walker.   3. Lower extremity exercise program x10 reps per handout, with independence  4.10 stairs with HR and SBA      2020 1025 by Audra Salas, PT  Outcome: Ongoing, Progressing   Ortho recommendations are now NWB to R UE which does not allow for patient to use RW and changes patient's mobility status to require more assist.  Pt only able to ambulate 12' with SPC and CGA.  Pt is at high risk for falls and re-admissions if she returns home at present time.

## 2020-11-18 NOTE — ASSESSMENT & PLAN NOTE
CT of the right shoulder revealed:  -There is a nondisplaced fracture of the greater tuberosity.    -There is a remote fracture involving the distal clavicle, which is healed.    -There is no shoulder dislocation.   PT and OT consult  Ortho consult  Sling  Pain control,     orthopedic is consulted,deceided non operative management.consulted PT,OT recommending rehab,consulted SW.

## 2020-11-18 NOTE — SUBJECTIVE & OBJECTIVE
"Past Medical History:   Diagnosis Date    Addiction to drug     Alcohol abuse     Colon polyps     Gastric bypass status for obesity 2006    GIB (gastrointestinal bleeding)     History of psychiatric hospitalization     Hx of psychiatric care     Hypertension     Postmenopausal HRT (hormone replacement therapy)     Psychiatric problem     Therapy     Ulcer        Past Surgical History:   Procedure Laterality Date    ABDOMINAL SURGERY      BREAST BIOPSY  2005    right    CHOLECYSTECTOMY      ESOPHAGOGASTRODUODENOSCOPY N/A 4/18/2019    Procedure: EGD (ESOPHAGOGASTRODUODENOSCOPY);  Surgeon: Mony Plunkett MD;  Location: [facility] ENDO;  Service: Endoscopy;  Laterality: N/A;    ESOPHAGOGASTRODUODENOSCOPY N/A 8/4/2020    Procedure: EGD (ESOPHAGOGASTRODUODENOSCOPY);  Surgeon: Jose F Oreilly MD;  Location: [facility] ENDO;  Service: Endoscopy;  Laterality: N/A;    GASTRIC BYPASS  2006    zackary en Y    HERNIA REPAIR  x2    LI hernia    HERNIA REPAIR      INFECTED SKIN DEBRIDEMENT  x3    KNEE ARTHROSCOPY W/ DEBRIDEMENT      right       Review of patient's allergies indicates:   Allergen Reactions    Aspirin      Due to gastric bypass    Ibuprofen      Other reaction(s): Due To Gastric Bypass  Due to gastric bypass    Inderal [propranolol]      "Felt bizarre"       No current facility-administered medications on file prior to encounter.      Current Outpatient Medications on File Prior to Encounter   Medication Sig    buPROPion (WELLBUTRIN) 100 MG tablet Take 1 tablet (100 mg total) by mouth once daily.    folic acid (FOLVITE) 1 MG tablet Take 1 tablet (1 mg total) by mouth once daily.    gabapentin (NEURONTIN) 100 MG capsule Take 100 mg by mouth 3 (three) times daily.    hydrOXYzine pamoate (VISTARIL) 25 MG Cap     multivitamin Tab Take 1 tablet by mouth once daily.    thiamine 100 MG tablet Take 1 tablet (100 mg total) by mouth once daily.    trazodone HCl (TRAZODONE ORAL)     FLUoxetine 20 MG capsule Take " 3 capsules (60 mg total) by mouth once daily. Also taking Fluoxetine 40 mg QAM    pantoprazole (PROTONIX) 40 MG tablet Take 1 tablet (40 mg total) by mouth 2 (two) times daily.    [DISCONTINUED] chlordiazepoxide (LIBRIUM) 25 MG Cap Take 2 capsules (50 mg total) by mouth 4 (four) times daily for 1 day, THEN 1 capsule (25 mg total) 4 (four) times daily for 1 day, THEN 1 capsule (25 mg total) every 12 (twelve) hours for 1 day, THEN 1 capsule (25 mg total) nightly for 1 day.     Family History     Problem Relation (Age of Onset)    Alzheimer's disease Father (78)    Breast cancer Maternal Uncle    Cancer Mother, Paternal Grandfather    Dementia Father    Hyperlipidemia Father    Hypertension Father        Tobacco Use    Smoking status: Never Smoker    Smokeless tobacco: Never Used   Substance and Sexual Activity    Alcohol use: Yes     Alcohol/week: 0.0 standard drinks     Comment: 1 small bottle wine daily (10oz) + 4 strong vodka cocktails daily    Drug use: No    Sexual activity: Yes     Partners: Male     Birth control/protection: None     Review of Systems   Constitutional: Positive for activity change and appetite change. Negative for chills, fatigue and fever.   HENT: Negative for congestion.    Eyes: Negative for photophobia.   Respiratory: Negative for cough and shortness of breath.    Cardiovascular: Negative for chest pain.   Gastrointestinal: Negative for abdominal pain, constipation, diarrhea and vomiting.   Endocrine: Negative for heat intolerance.   Genitourinary: Negative for dysuria.   Musculoskeletal: Positive for arthralgias and gait problem.   Skin: Negative for rash.   Allergic/Immunologic: Negative for immunocompromised state.   Neurological: Positive for light-headedness. Negative for dizziness and weakness.   Hematological: Does not bruise/bleed easily.   Psychiatric/Behavioral: Positive for dysphoric mood. Negative for confusion. The patient is not nervous/anxious.      Objective:      Vital Signs (Most Recent):  Temp: 98 °F (36.7 °C) (11/18/20 0704)  Pulse: 76 (11/18/20 0704)  Resp: 16 (11/18/20 1033)  BP: 125/75 (11/18/20 0704)  SpO2: 97 % (11/18/20 0704) Vital Signs (24h Range):  Temp:  [97.7 °F (36.5 °C)-98.2 °F (36.8 °C)] 98 °F (36.7 °C)  Pulse:  [73-79] 76  Resp:  [14-18] 16  SpO2:  [97 %-100 %] 97 %  BP: (125-150)/(72-92) 125/75     Weight: 70.3 kg (155 lb)  Body mass index is 25.79 kg/m².    Physical Exam  Vitals signs and nursing note reviewed.   Constitutional:       General: She is not in acute distress.     Appearance: She is well-developed. She is not ill-appearing, toxic-appearing or diaphoretic.   HENT:      Head: Normocephalic and atraumatic.      Mouth/Throat:      Pharynx: No oropharyngeal exudate.   Eyes:      General: No scleral icterus.        Right eye: No discharge.         Left eye: No discharge.      Conjunctiva/sclera: Conjunctivae normal.      Pupils: Pupils are equal, round, and reactive to light.   Neck:      Musculoskeletal: Normal range of motion and neck supple.      Thyroid: No thyromegaly.      Vascular: No JVD.      Trachea: No tracheal deviation.   Cardiovascular:      Rate and Rhythm: Normal rate and regular rhythm.      Heart sounds: Normal heart sounds. No murmur. No friction rub. No gallop.    Pulmonary:      Effort: Pulmonary effort is normal. No respiratory distress.      Breath sounds: Normal breath sounds. No stridor. No decreased breath sounds, wheezing, rhonchi or rales.   Chest:      Chest wall: No tenderness.   Abdominal:      General: Bowel sounds are normal. There is no distension.      Palpations: Abdomen is soft. There is no mass.      Tenderness: There is no abdominal tenderness. There is no guarding or rebound.   Genitourinary:     Comments: No singh  Musculoskeletal:      Right shoulder: She exhibits decreased range of motion, tenderness, bony tenderness and swelling.   Lymphadenopathy:      Cervical: No cervical adenopathy.      Comments:  No peripheral edema   Skin:     General: Skin is warm and dry.      Coloration: Skin is not pale.      Findings: No erythema or rash.   Neurological:      Mental Status: She is alert and oriented to person, place, and time.      GCS: GCS eye subscore is 4. GCS verbal subscore is 5. GCS motor subscore is 6.      Cranial Nerves: No cranial nerve deficit.      Motor: No abnormal muscle tone.      Coordination: Coordination normal.   Psychiatric:         Mood and Affect: Mood is depressed. Affect is flat.         Behavior: Behavior normal.         Thought Content: Thought content normal.         Cognition and Memory: Cognition and memory normal.         Judgment: Judgment normal.           CRANIAL NERVES     CN III, IV, VI   Pupils are equal, round, and reactive to light.       Significant Labs:   Recent Results (from the past 24 hour(s))   Basic Metabolic Panel (BMP)    Collection Time: 11/18/20  4:57 AM   Result Value Ref Range    Sodium 138 136 - 145 mmol/L    Potassium 3.9 3.5 - 5.1 mmol/L    Chloride 107 95 - 110 mmol/L    CO2 23 23 - 29 mmol/L    Glucose 120 (H) 70 - 110 mg/dL    BUN 6 6 - 20 mg/dL    Creatinine 0.7 0.5 - 1.4 mg/dL    Calcium 7.7 (L) 8.7 - 10.5 mg/dL    Anion Gap 8 8 - 16 mmol/L    eGFR if African American >60 >60 mL/min/1.73 m^2    eGFR if non African American >60 >60 mL/min/1.73 m^2   Phosphorus    Collection Time: 11/18/20  4:57 AM   Result Value Ref Range    Phosphorus 2.8 2.7 - 4.5 mg/dL   Magnesium    Collection Time: 11/18/20  4:57 AM   Result Value Ref Range    Magnesium 1.8 1.6 - 2.6 mg/dL   CBC with Automated Differential    Collection Time: 11/18/20  4:57 AM   Result Value Ref Range    WBC 3.82 (L) 3.90 - 12.70 K/uL    RBC 3.29 (L) 4.00 - 5.40 M/uL    Hemoglobin 9.8 (L) 12.0 - 16.0 g/dL    Hematocrit 31.9 (L) 37.0 - 48.5 %    MCV 97 82 - 98 fL    MCH 29.8 27.0 - 31.0 pg    MCHC 30.7 (L) 32.0 - 36.0 g/dL    RDW 16.9 (H) 11.5 - 14.5 %    Platelets 155 150 - 350 K/uL    MPV 11.9 9.2 - 12.9  fL    Immature Granulocytes 0.3 0.0 - 0.5 %    Gran # (ANC) 2.0 1.8 - 7.7 K/uL    Immature Grans (Abs) 0.01 0.00 - 0.04 K/uL    Lymph # 1.2 1.0 - 4.8 K/uL    Mono # 0.3 0.3 - 1.0 K/uL    Eos # 0.2 0.0 - 0.5 K/uL    Baso # 0.03 0.00 - 0.20 K/uL    nRBC 0 0 /100 WBC    Gran % 52.8 38.0 - 73.0 %    Lymph % 32.5 18.0 - 48.0 %    Mono % 8.6 4.0 - 15.0 %    Eosinophil % 5.0 0.0 - 8.0 %    Basophil % 0.8 0.0 - 1.9 %    Differential Method Automated      Significant Imaging:   XR CHEST PA AND LATERAL  FINDINGS:  Cardiac silhouette and pulmonary vascularity are within normal limits.  Lungs are symmetrically expanded and show no evidence of significant focal consolidation, large effusion or pneumothorax.  Bones demonstrated a remote right clavicular fracture.  No evidence of acute fracture.  There are cholecystectomy clips.     Impression:   No acute radiographic findings in the chest.      Electronically signed by: Thiago Smiley MD  Date:                                            11/16/2020  Time:                                           17:13    XR SHOULDER COMPLETE 2 OR MORE VIEWS RIGHT  FINDINGS:  There is a remote fracture of the right distal clavicle.  There are degenerative changes of the acromioclavicular joint.  There is no evidence of acute fracture, dislocation or bone destruction.  Surrounding soft tissue structures show no acute abnormalities.     Impression:   No acute bony abnormalities.  Remote distal clavicular fracture.      Electronically signed by: Thiago Smiley MD  Date:                                            11/16/2020  Time:                                           17:18    16-NOV-2020 18:05:24 EKG   Normal sinus rhythm  Vent. rate 87 BPM  ME interval 138 ms  QRS duration 84 ms   QTc 390 ms (Manual calculation by Bazett measurement)   QTc  534 ms (EKG computer calculation)  Prolonged QT  Abnormal ECG  When compared with ECG of 03-SEP-2020 13:11,  Significant changes have occurred    16-NOV-2020  20:38:37 EKG    Normal sinus rhythm  Vent. rate 88 BPM  MO interval 130 ms  QRS duration 84 ms   QTc  412 ms (manual calculation by Bazett measurement)    QTc 634 ms (EKG computer calculation)  Prolonged QT  Abnormal ECG  When compared with ECG of 16-NOV-2020 18:05,  Significant changes have occurred

## 2020-11-18 NOTE — PT/OT/SLP PROGRESS
Occupational Therapy   Treatment    Name: Alicia Chu  MRN: [id number]  Admitting Diagnosis:  Fracture of right shoulder girdle, part unspecified, initial encounter for open fracture       Recommendations:     Discharge Recommendations: rehabilitation facility  Discharge Equipment Recommendations:  shower chair, cane, straight, bedside commode  Barriers to discharge:  (fall risk and unable to amb using a RW 2* RUE NWB. Bed and bathroom is on the 2nd floor)    Assessment:     Alicia Chu is a 60 y.o. female with a medical diagnosis of Fracture of right shoulder girdle, part unspecified, initial encounter for open fracture.  Performance deficits affecting function are impaired self care skills, impaired functional mobilty, gait instability, impaired balance, decreased coordination, decreased upper extremity function, decreased lower extremity function, decreased safety awareness, pain, decreased ROM, orthopedic precautions.   The patient c/o 7/10 right shoulder pain. The patient reports pain relief with use of RUE sling and support of a pillow while seated in the chair. The patient demo poor safety awareness with min assist needed to perform toilet transfer. The patient was unable to use a RW for amb and required CGA to amb with a cane and c/o LE weakness. The patient has a history of falls at home and is currently unsafe for D/C. The patient will benefit from In Patient Rehab.    Rehab Prognosis:  Good and Fair; patient would benefit from acute skilled OT services to address these deficits and reach maximum level of function.       Plan:     Patient to be seen 5 x/week to address the above listed problems via self-care/home management, therapeutic activities, therapeutic exercises  · Plan of Care Expires: 12/01/20  · Plan of Care Reviewed with: patient    Subjective     Pain/Comfort:  Pain Rating 1: 7/10  Location - Side 1: Right  Location 1: shoulder  Pain Addressed 1: Pre-medicate for activity, Reposition,  Cessation of Activity, Nurse notified(pain meds requested)    Objective:     Communicated with: Esther david prior to session.  Patient found HOB elevated with peripheral IV, bed alarm, telemetry upon OT entry to room.    General Precautions: Standard, fall   Orthopedic Precautions:RUE non weight bearing   Braces: (RUE sling was requested and received during OT tx)     Occupational Performance:     Bed Mobility:    · Patient completed Scooting/Bridging with stand by assistance  · Patient completed Supine to Sit with stand by assistance and and verbal cues to observe NWB to the RUE.     Functional Mobility/Transfers:  · Patient completed Sit <> Stand Transfer with contact guard assistance  with  hand-held assist   · Patient completed Toilet Transfer Step Transfer technique with minimum assistance with  straight cane and back of shower chair to left side to similate left grab bar. The patient required 4 attempts befor standing from the toilet.  · Functional Mobility: The patient amb with use of a cane from the bed to toilet to chair with CGA. The patient c/o leg weakness and unable to tolerate standing at the sink and to amb >15'.    Activities of Daily Living:  · Upper Body Dressing: maximal assistance and dependence to don back gown and don/doff right sling  · Lower Body Dressing: dependence        [facility] 6 Click ADL: 19    Treatment & Education:  · The patient participated in self care and functional mobility as noted above.  · The patient was educated re: NWB to RUE and sling recommended when OOB  · The patient was educated re: AROM to elbow, forearm, wrist and hand  The patient was able to perform AROM x20 reps to elbow, forearm, wrist and hand    Patient left up in chair with all lines intact, call button in reach, chair alarm on and nurseEsther notifiedEducation:      GOALS:   Multidisciplinary Problems     Occupational Therapy Goals        Problem: Occupational Therapy Goal    Goal Priority Disciplines  Outcome Interventions   Occupational Therapy Goal     OT, PT/OT Ongoing, Progressing    Description: Goals to be met by: 12/1/2020    Patient will increase functional independence with ADLs by performing:    UE Dressing with Modified [place].  LE Dressing with Modified [place].  Grooming while standing at sink with Modified [place].  Toileting from toilet with Modified [place] for hygiene and clothing management.   Supine to sit with Modified [place].  Step transfer with Modified [place]  Toilet transfer to toilet with Modified [place].  Upper extremity exercise program x15 reps per handout, with independence.  LUE AROM/AAROM/PROM exercise program x15 reps w/ assistance as needed.                      Time Tracking:     OT Date of Treatment: 11/18/20  OT Start Time: 0949  OT Stop Time: 1027  OT Total Time (min): 38 min    Billable Minutes:Self Care/Home Management 15  Therapeutic Exercise 8  Total Time 38 (with PT)    Sara Samaniego OT  11/18/2020

## 2020-11-18 NOTE — PLAN OF CARE
"To patient's room to discuss patient managing her care at home.      TN Role Explained.  Patient identified by using 2 identifiers:  Name and date of birth    Patient stated that her  (who just had surgery) WILL HELP AT HOME WITH her RECOVERY as much as he is able as he too just had surgery.       TN reviewed with patient contents of "Eat [employer] Packet".      TN name and contact info placed on the communication board along with disposition, approximate date of DC, next steps and emergency contact    Preferred Pharmacy:    [address] [facility]  [address] [facility]  [place] [address]  Phone: [phone] Fax: [phone]     Preferred appointment time: am       11/18/20 1213   Discharge Assessment   Assessment Type Discharge Planning Assessment   Confirmed/corrected address and phone number on facesheet? Yes   Assessment information obtained from? Patient   Expected Length of Stay (days) 2   Communicated expected length of stay with patient/caregiver yes   Prior to hospitilization cognitive status: Alert/Oriented   Prior to hospitalization functional status: Assistive Equipment   Current cognitive status: Alert/Oriented   Current Functional Status: Needs Assistance   Lives With spouse   Able to Return to Prior Arrangements yes   Patient's perception of discharge disposition rehab facility   Readmission Within the Last 30 Days current reason for admission unrelated to previous admission   If yes, most recent facility name: [facility]   Patient currently being followed by outpatient case management? No   Patient currently receives any other outside agency services? Yes   Name and contact number of agency or person providing outside services OP PT   Is it the patient/care giver preference to resume care with the current outside agency? No   Equipment Currently Used at Home none  (uses husbands rollator and standard walker)   Part D Coverage [facility]   Do you have any " problems affording any of your prescribed medications? No   Is the patient taking medications as prescribed? yes   Does the patient have transportation home? Yes   Transportation Anticipated public transportation   Does the patient receive services at the [facility]? No   Discharge Plan A Rehab   Discharge Plan B Home Health   DME Needed Upon Discharge    (Per PT recs)   Patient/Family in Agreement with Plan yes   Readmission Questionnaire   At the time of your discharge, did someone talk to you about what your health problems were? Yes   At the time of discharge, did someone talk to you about what to watch out for regarding worsening of your health problem? Yes   At the time of discharge, did someone talk to you about what to do if you experienced worsening of your health problem? Yes   At the time of discharge, did someone talk to you about which medication to take when you left the hospital and which ones to stop taking? Yes   At the time of discharge, did someone talk to you about when and where to follow up with a doctor after you left the hospital? Yes   What do you believe caused you to be sick enough to be re-admitted? Fell down the stairs   How often do you need to have someone help you when you read instructions, pamphlets, or other written material from your doctor or pharmacy? Never   Do you have problems taking your medications as prescribed? No   Do you have any problems affording any of  your prescribed medications? No   Do you have problems obtaining/receiving your medications? No   Does the patient have transportation to healthcare appointments? Yes   Living Arrangements house  (@ stories)   Does the patient have family/friends to help with healtcare needs after discharge? yes   Does your caregiver provide all the help you need? No   If no, what kind of help do you need at home?  just had surgery   Are you currently feeling confused? No   Are you currently having problems thinking? No    Are you currently having memory problems? No   Have you felt down, depressed, or hopeless?   (Occasionaly.  On meds.)   In the last 7 days, my sleep quality was: very good

## 2020-11-18 NOTE — PROGRESS NOTES
"[facility] Hospital Medicine  Progress Note    Patient Name: Alicia hCu  MRN: [id number]  Patient Class: IP- Inpatient   Admission Date: 11/16/2020  Length of Stay: 2 days  Attending Physician: Ailyn Mccarthy MD  Primary Care Provider: Primary Doctor No        Subjective:     Principal Problem:Fracture of right shoulder girdle, part unspecified, initial encounter for open fracture        HPI:  Ms. Chu is a 61yo lady with a past medical history of ETOH abuse, HTN, gastric bypass and psychiatric issues related to mood disorder from ETOH abuse.  She was recently discharged from rehab on 11/5/20 after a 5 week stay due to alcohol abuse related leg weakness (she could not walk).     She did well after the stint in rehab until this past Saturday when she developed the sudden onset of dizziness.  Despite feeling dizzy, she decided to go up the stairs.  Unfortunately she made it to the 6th of 12 steps, then lost her balance and tumbled down the hardwood steps to the tiled floor below.  She struck her right should and head.  She states she did not pass out before or after the fall (did not lose consciousness).    She did, however, immediately have right shoulder pain, so she called 911.  They came and recommended that she go to the ED for evaluation.  She refused, stayed home, and decided to self medicate the shoulder pain with, "a few bloody yosvany."  She tells me, "these did nothing for the pain, so I finally decided to come in."    Her only other major complaint is nausea due to the pain.    Overview/Hospital Course:  Ms. Chu is a 61yo lady with a past medical history of ETOH abuse, HTN, gastric bypass and psychiatric issues related to mood disorder from ETOH abuse.  She was recently discharged from rehab on 11/5/20 after a 5 week stay due to alcohol abuse related leg weakness (she could not walk).   She did well after the stint in rehab until this past Saturday when she developed the " "sudden onset of dizziness.  Despite feeling dizzy, she decided to go up the stairs.  Unfortunately she made it to the 6th of 12 steps, then lost her balance and tumbled down the hardwood steps to the tiled floor below.  She struck her right should and head.  She states she did not pass out before or after the fall (did not lose consciousness).  She did, however, immediately have right shoulder pain, so she called 911.  They came and recommended that she go to the ED for evaluation.  She refused, stayed home, and decided to self medicate the shoulder pain with, "a few bloody yosvany."  She tells me, "these did nothing for the pain, so I finally decided to come in."  CT show,  Undisplaced fracture of the greater tuberosity. orthopedic is consulted,deceided non operative management.consulted PT,OT recommending rehab,consulted SW.      Past Medical History:   Diagnosis Date    Addiction to drug     Alcohol abuse     Colon polyps     Gastric bypass status for obesity 2006    GIB (gastrointestinal bleeding)     History of psychiatric hospitalization     Hx of psychiatric care     Hypertension     Postmenopausal HRT (hormone replacement therapy)     Psychiatric problem     Therapy     Ulcer        Past Surgical History:   Procedure Laterality Date    ABDOMINAL SURGERY      BREAST BIOPSY  2005    right    CHOLECYSTECTOMY      ESOPHAGOGASTRODUODENOSCOPY N/A 4/18/2019    Procedure: EGD (ESOPHAGOGASTRODUODENOSCOPY);  Surgeon: Mony Plunkett MD;  Location: [facility] ENDO;  Service: Endoscopy;  Laterality: N/A;    ESOPHAGOGASTRODUODENOSCOPY N/A 8/4/2020    Procedure: EGD (ESOPHAGOGASTRODUODENOSCOPY);  Surgeon: Jose F Oreilly MD;  Location: [facility] ENDO;  Service: Endoscopy;  Laterality: N/A;    GASTRIC BYPASS  2006    zackary en Y    HERNIA REPAIR  x2    LI hernia    HERNIA REPAIR      INFECTED SKIN DEBRIDEMENT  x3    KNEE ARTHROSCOPY W/ DEBRIDEMENT      right       Review of patient's allergies indicates:   Allergen " "Reactions    Aspirin      Due to gastric bypass    Ibuprofen      Other reaction(s): Due To Gastric Bypass  Due to gastric bypass    Inderal [propranolol]      "Felt bizarre"       No current facility-administered medications on file prior to encounter.      Current Outpatient Medications on File Prior to Encounter   Medication Sig    buPROPion (WELLBUTRIN) 100 MG tablet Take 1 tablet (100 mg total) by mouth once daily.    folic acid (FOLVITE) 1 MG tablet Take 1 tablet (1 mg total) by mouth once daily.    gabapentin (NEURONTIN) 100 MG capsule Take 100 mg by mouth 3 (three) times daily.    hydrOXYzine pamoate (VISTARIL) 25 MG Cap     multivitamin Tab Take 1 tablet by mouth once daily.    thiamine 100 MG tablet Take 1 tablet (100 mg total) by mouth once daily.    trazodone HCl (TRAZODONE ORAL)     FLUoxetine 20 MG capsule Take 3 capsules (60 mg total) by mouth once daily. Also taking Fluoxetine 40 mg QAM    pantoprazole (PROTONIX) 40 MG tablet Take 1 tablet (40 mg total) by mouth 2 (two) times daily.    [DISCONTINUED] chlordiazepoxide (LIBRIUM) 25 MG Cap Take 2 capsules (50 mg total) by mouth 4 (four) times daily for 1 day, THEN 1 capsule (25 mg total) 4 (four) times daily for 1 day, THEN 1 capsule (25 mg total) every 12 (twelve) hours for 1 day, THEN 1 capsule (25 mg total) nightly for 1 day.     Family History     Problem Relation (Age of Onset)    Alzheimer's disease Father (78)    Breast cancer Maternal Uncle    Cancer Mother, Paternal Grandfather    Dementia Father    Hyperlipidemia Father    Hypertension Father        Tobacco Use    Smoking status: Never Smoker    Smokeless tobacco: Never Used   Substance and Sexual Activity    Alcohol use: Yes     Alcohol/week: 0.0 standard drinks     Comment: 1 small bottle wine daily (10oz) + 4 strong vodka cocktails daily    Drug use: No    Sexual activity: Yes     Partners: Male     Birth control/protection: None     Review of Systems   Constitutional: " Positive for activity change and appetite change. Negative for chills, fatigue and fever.   HENT: Negative for congestion.    Eyes: Negative for photophobia.   Respiratory: Negative for cough and shortness of breath.    Cardiovascular: Negative for chest pain.   Gastrointestinal: Negative for abdominal pain, constipation, diarrhea and vomiting.   Endocrine: Negative for heat intolerance.   Genitourinary: Negative for dysuria.   Musculoskeletal: Positive for arthralgias and gait problem.   Skin: Negative for rash.   Allergic/Immunologic: Negative for immunocompromised state.   Neurological: Positive for light-headedness. Negative for dizziness and weakness.   Hematological: Does not bruise/bleed easily.   Psychiatric/Behavioral: Positive for dysphoric mood. Negative for confusion. The patient is not nervous/anxious.      Objective:     Vital Signs (Most Recent):  Temp: 98 °F (36.7 °C) (11/18/20 0704)  Pulse: 76 (11/18/20 0704)  Resp: 16 (11/18/20 1033)  BP: 125/75 (11/18/20 0704)  SpO2: 97 % (11/18/20 0704) Vital Signs (24h Range):  Temp:  [97.7 °F (36.5 °C)-98.2 °F (36.8 °C)] 98 °F (36.7 °C)  Pulse:  [73-79] 76  Resp:  [14-18] 16  SpO2:  [97 %-100 %] 97 %  BP: (125-150)/(72-92) 125/75     Weight: 70.3 kg (155 lb)  Body mass index is 25.79 kg/m².    Physical Exam  Vitals signs and nursing note reviewed.   Constitutional:       General: She is not in acute distress.     Appearance: She is well-developed. She is not ill-appearing, toxic-appearing or diaphoretic.   HENT:      Head: Normocephalic and atraumatic.      Mouth/Throat:      Pharynx: No oropharyngeal exudate.   Eyes:      General: No scleral icterus.        Right eye: No discharge.         Left eye: No discharge.      Conjunctiva/sclera: Conjunctivae normal.      Pupils: Pupils are equal, round, and reactive to light.   Neck:      Musculoskeletal: Normal range of motion and neck supple.      Thyroid: No thyromegaly.      Vascular: No JVD.      Trachea: No  tracheal deviation.   Cardiovascular:      Rate and Rhythm: Normal rate and regular rhythm.      Heart sounds: Normal heart sounds. No murmur. No friction rub. No gallop.    Pulmonary:      Effort: Pulmonary effort is normal. No respiratory distress.      Breath sounds: Normal breath sounds. No stridor. No decreased breath sounds, wheezing, rhonchi or rales.   Chest:      Chest wall: No tenderness.   Abdominal:      General: Bowel sounds are normal. There is no distension.      Palpations: Abdomen is soft. There is no mass.      Tenderness: There is no abdominal tenderness. There is no guarding or rebound.   Genitourinary:     Comments: No singh  Musculoskeletal:      Right shoulder: She exhibits decreased range of motion, tenderness, bony tenderness and swelling.   Lymphadenopathy:      Cervical: No cervical adenopathy.      Comments: No peripheral edema   Skin:     General: Skin is warm and dry.      Coloration: Skin is not pale.      Findings: No erythema or rash.   Neurological:      Mental Status: She is alert and oriented to person, place, and time.      GCS: GCS eye subscore is 4. GCS verbal subscore is 5. GCS motor subscore is 6.      Cranial Nerves: No cranial nerve deficit.      Motor: No abnormal muscle tone.      Coordination: Coordination normal.   Psychiatric:         Mood and Affect: Mood is depressed. Affect is flat.         Behavior: Behavior normal.         Thought Content: Thought content normal.         Cognition and Memory: Cognition and memory normal.         Judgment: Judgment normal.           CRANIAL NERVES     CN III, IV, VI   Pupils are equal, round, and reactive to light.       Significant Labs:   Recent Results (from the past 24 hour(s))   Basic Metabolic Panel (BMP)    Collection Time: 11/18/20  4:57 AM   Result Value Ref Range    Sodium 138 136 - 145 mmol/L    Potassium 3.9 3.5 - 5.1 mmol/L    Chloride 107 95 - 110 mmol/L    CO2 23 23 - 29 mmol/L    Glucose 120 (H) 70 - 110 mg/dL     BUN 6 6 - 20 mg/dL    Creatinine 0.7 0.5 - 1.4 mg/dL    Calcium 7.7 (L) 8.7 - 10.5 mg/dL    Anion Gap 8 8 - 16 mmol/L    eGFR if African American >60 >60 mL/min/1.73 m^2    eGFR if non African American >60 >60 mL/min/1.73 m^2   Phosphorus    Collection Time: 11/18/20  4:57 AM   Result Value Ref Range    Phosphorus 2.8 2.7 - 4.5 mg/dL   Magnesium    Collection Time: 11/18/20  4:57 AM   Result Value Ref Range    Magnesium 1.8 1.6 - 2.6 mg/dL   CBC with Automated Differential    Collection Time: 11/18/20  4:57 AM   Result Value Ref Range    WBC 3.82 (L) 3.90 - 12.70 K/uL    RBC 3.29 (L) 4.00 - 5.40 M/uL    Hemoglobin 9.8 (L) 12.0 - 16.0 g/dL    Hematocrit 31.9 (L) 37.0 - 48.5 %    MCV 97 82 - 98 fL    MCH 29.8 27.0 - 31.0 pg    MCHC 30.7 (L) 32.0 - 36.0 g/dL    RDW 16.9 (H) 11.5 - 14.5 %    Platelets 155 150 - 350 K/uL    MPV 11.9 9.2 - 12.9 fL    Immature Granulocytes 0.3 0.0 - 0.5 %    Gran # (ANC) 2.0 1.8 - 7.7 K/uL    Immature Grans (Abs) 0.01 0.00 - 0.04 K/uL    Lymph # 1.2 1.0 - 4.8 K/uL    Mono # 0.3 0.3 - 1.0 K/uL    Eos # 0.2 0.0 - 0.5 K/uL    Baso # 0.03 0.00 - 0.20 K/uL    nRBC 0 0 /100 WBC    Gran % 52.8 38.0 - 73.0 %    Lymph % 32.5 18.0 - 48.0 %    Mono % 8.6 4.0 - 15.0 %    Eosinophil % 5.0 0.0 - 8.0 %    Basophil % 0.8 0.0 - 1.9 %    Differential Method Automated      Significant Imaging:   XR CHEST PA AND LATERAL  FINDINGS:  Cardiac silhouette and pulmonary vascularity are within normal limits.  Lungs are symmetrically expanded and show no evidence of significant focal consolidation, large effusion or pneumothorax.  Bones demonstrated a remote right clavicular fracture.  No evidence of acute fracture.  There are cholecystectomy clips.     Impression:   No acute radiographic findings in the chest.      Electronically signed by: Thiago Smiley MD  Date:                                            11/16/2020  Time:                                           17:13    XR SHOULDER COMPLETE 2 OR MORE VIEWS  RIGHT  FINDINGS:  There is a remote fracture of the right distal clavicle.  There are degenerative changes of the acromioclavicular joint.  There is no evidence of acute fracture, dislocation or bone destruction.  Surrounding soft tissue structures show no acute abnormalities.     Impression:   No acute bony abnormalities.  Remote distal clavicular fracture.      Electronically signed by: Thiago Smiley MD  Date:                                            11/16/2020  Time:                                           17:18    16-NOV-2020 18:05:24 EKG   Normal sinus rhythm  Vent. rate 87 BPM  NV interval 138 ms  QRS duration 84 ms   QTc 390 ms (Manual calculation by Bazett measurement)   QTc  534 ms (EKG computer calculation)  Prolonged QT  Abnormal ECG  When compared with ECG of 03-SEP-2020 13:11,  Significant changes have occurred    16-NOV-2020 20:38:37 EKG    Normal sinus rhythm  Vent. rate 88 BPM  NV interval 130 ms  QRS duration 84 ms   QTc  412 ms (manual calculation by Bazett measurement)    QTc 634 ms (EKG computer calculation)  Prolonged QT  Abnormal ECG  When compared with ECG of 16-NOV-2020 18:05,  Significant changes have occurred        Assessment/Plan:      * Fracture of right shoulder girdle, part unspecified, initial encounter for open fracture  CT of the right shoulder revealed:  -There is a nondisplaced fracture of the greater tuberosity.    -There is a remote fracture involving the distal clavicle, which is healed.    -There is no shoulder dislocation.   PT and OT consult  Ortho consult  Sling  Pain control,     orthopedic is consulted,deceided non operative management.consulted PT,OT recommending rehab,consulted SW.    Alcoholism  She has chronic ETOH mood disorder treated with:   -Trazadone 100mg po qhs   -Fluoxetine 60mg po qday   -Wellbutrin 100mg po qday  In the past 7 weeks, she has only had one bloody garrison      Fall down stairs  Ms. Chu fell down 6 steps after becoming dizzy at home this  past Saturday  She never lost consciousness  She states that she did strike her head but was fine afterwards  CT head not indicated in minor head trauma with normal mental status   -Trauma was also almost 3 days ago      Prolonged QT interval  The computer is reading out her QTc to be in the 600's  On manual calculation using Bazett's, her QTc is consistently around 400      Hypomagnesemia  Her Mg was 1.5 on arrival, increased to 2.1 after 2g iv MgSO4      Hypokalemia  She had an Istat of 2.8, was given 60 meq KCl PO  Her repeat K was 3.8        VTE Risk Mitigation (From admission, onward)         Ordered     enoxaparin injection 40 mg  Every 24 hours      11/18/20 1057     Place SENAIT hose  Until discontinued      11/16/20 2318     Place sequential compression device  Until discontinued      11/16/20 2318     IP VTE LOW RISK PATIENT  Once      11/16/20 2318                Discharge Planning   FARZANA: 11/18/2020     Code Status: Full Code   Is the patient medically ready for discharge?:     Reason for patient still in hospital (select all that apply): Patient trending condition                     Ailyn Mccarthy MD  Department of Hospital Medicine   [facility]

## 2020-11-18 NOTE — HOSPITAL COURSE
"Ms. Chu is a 61yo lady with a past medical history of ETOH abuse, HTN, gastric bypass and psychiatric issues related to mood disorder from ETOH abuse.  She was recently discharged from rehab on 11/5/20 after a 5 week stay due to alcohol abuse related leg weakness (she could not walk).   She did well after the stint in rehab until this past Saturday when she developed the sudden onset of dizziness.  Despite feeling dizzy, she decided to go up the stairs.  Unfortunately she made it to the 6th of 12 steps, then lost her balance and tumbled down the hardwood steps to the tiled floor below.  She struck her right should and head.  She states she did not pass out before or after the fall (did not lose consciousness).  She did, however, immediately have right shoulder pain, so she called 911.  They came and recommended that she go to the ED for evaluation.  She refused, stayed home, and decided to self medicate the shoulder pain with, "a few bloody yosvany."  She states, "these did nothing for the pain, so I finally decided to come in."  CT show,  Undisplaced fracture of the greater tuberosity. orthopedic is consulted,deceided non operative management.consulted PT,OT recommending rehab,consulted SW.  Patient denies dizziness,no sign of focal neuro deficiency during hospital stay.her pain is improved,patient refused rehab placement,want go home with out patient PT,OT.patient was discharged home with pan medications and PT,OT and follow up with PC and orthopedic as out patient.    "

## 2020-11-18 NOTE — PLAN OF CARE
Problem: Occupational Therapy Goal  Goal: Occupational Therapy Goal  Description: Goals to be met by: 12/1/2020    Patient will increase functional independence with ADLs by performing:    UE Dressing with Modified [place].  LE Dressing with Modified [place].  Grooming while standing at sink with Modified [place].  Toileting from toilet with Modified [place] for hygiene and clothing management.   Supine to sit with Modified [place].  Step transfer with Modified [place]  Toilet transfer to toilet with Modified [place].  Upper extremity exercise program x15 reps per handout, with independence.  LUE AROM/AAROM/PROM exercise program x15 reps w/ assistance as needed.     Outcome: Ongoing, Progressing   The patient required verbal cues to perform functional mobility with NWB to the RUE as ordered by ortho. The patient required min assist to stand from the bed and toilet with unsteady gait while using a cane to ambulate.   The patient will benefit from In Patient Rehab to optimize the patient's ability to D/C home safely.

## 2020-11-18 NOTE — PLAN OF CARE
11/18/20 1245   Post-Acute Status   Post-Acute Authorization Placement  (IPR)   Post-Acute Placement Status Referrals Sent   Discharge Delays None known at this time   Discharge Plan   Discharge Plan A Rehab

## 2020-11-19 LAB
ANION GAP SERPL CALC-SCNC: 2 MMOL/L (ref 8–16)
BASOPHILS # BLD AUTO: 0.02 K/UL (ref 0–0.2)
BASOPHILS NFR BLD: 0.7 % (ref 0–1.9)
BUN SERPL-MCNC: 9 MG/DL (ref 6–20)
CALCIUM SERPL-MCNC: 7.9 MG/DL (ref 8.7–10.5)
CHLORIDE SERPL-SCNC: 109 MMOL/L (ref 95–110)
CO2 SERPL-SCNC: 26 MMOL/L (ref 23–29)
CREAT SERPL-MCNC: 0.8 MG/DL (ref 0.5–1.4)
DIFFERENTIAL METHOD: ABNORMAL
EOSINOPHIL # BLD AUTO: 0.2 K/UL (ref 0–0.5)
EOSINOPHIL NFR BLD: 6.6 % (ref 0–8)
ERYTHROCYTE [DISTWIDTH] IN BLOOD BY AUTOMATED COUNT: 16.7 % (ref 11.5–14.5)
EST. GFR  (AFRICAN AMERICAN): >60 ML/MIN/1.73 M^2
EST. GFR  (NON AFRICAN AMERICAN): >60 ML/MIN/1.73 M^2
GLUCOSE SERPL-MCNC: 89 MG/DL (ref 70–110)
HCT VFR BLD AUTO: 29.7 % (ref 37–48.5)
HGB BLD-MCNC: 9.2 G/DL (ref 12–16)
IMM GRANULOCYTES # BLD AUTO: 0.02 K/UL (ref 0–0.04)
IMM GRANULOCYTES NFR BLD AUTO: 0.7 % (ref 0–0.5)
LYMPHOCYTES # BLD AUTO: 0.9 K/UL (ref 1–4.8)
LYMPHOCYTES NFR BLD: 30.6 % (ref 18–48)
MAGNESIUM SERPL-MCNC: 1.6 MG/DL (ref 1.6–2.6)
MCH RBC QN AUTO: 29.9 PG (ref 27–31)
MCHC RBC AUTO-ENTMCNC: 31 G/DL (ref 32–36)
MCV RBC AUTO: 96 FL (ref 82–98)
MONOCYTES # BLD AUTO: 0.3 K/UL (ref 0.3–1)
MONOCYTES NFR BLD: 10.5 % (ref 4–15)
NEUTROPHILS # BLD AUTO: 1.6 K/UL (ref 1.8–7.7)
NEUTROPHILS NFR BLD: 50.9 % (ref 38–73)
NRBC BLD-RTO: 0 /100 WBC
PHOSPHATE SERPL-MCNC: 2.5 MG/DL (ref 2.7–4.5)
PLATELET # BLD AUTO: 151 K/UL (ref 150–350)
PMV BLD AUTO: 11 FL (ref 9.2–12.9)
POTASSIUM SERPL-SCNC: 3.5 MMOL/L (ref 3.5–5.1)
RBC # BLD AUTO: 3.08 M/UL (ref 4–5.4)
SODIUM SERPL-SCNC: 137 MMOL/L (ref 136–145)
WBC # BLD AUTO: 3.04 K/UL (ref 3.9–12.7)

## 2020-11-19 PROCEDURE — 25000003 PHARM REV CODE 250: Performed by: HOSPITALIST

## 2020-11-19 PROCEDURE — 80048 BASIC METABOLIC PNL TOTAL CA: CPT

## 2020-11-19 PROCEDURE — 85025 COMPLETE CBC W/AUTO DIFF WBC: CPT

## 2020-11-19 PROCEDURE — 25000003 PHARM REV CODE 250: Performed by: INTERNAL MEDICINE

## 2020-11-19 PROCEDURE — 63600175 PHARM REV CODE 636 W HCPCS: Performed by: HOSPITALIST

## 2020-11-19 PROCEDURE — 83735 ASSAY OF MAGNESIUM: CPT

## 2020-11-19 PROCEDURE — 36415 COLL VENOUS BLD VENIPUNCTURE: CPT

## 2020-11-19 PROCEDURE — 97530 THERAPEUTIC ACTIVITIES: CPT | Mod: CQ

## 2020-11-19 PROCEDURE — 84100 ASSAY OF PHOSPHORUS: CPT

## 2020-11-19 PROCEDURE — 97116 GAIT TRAINING THERAPY: CPT | Mod: CQ

## 2020-11-19 PROCEDURE — 97535 SELF CARE MNGMENT TRAINING: CPT

## 2020-11-19 PROCEDURE — 11000001 HC ACUTE MED/SURG PRIVATE ROOM

## 2020-11-19 RX ADMIN — GABAPENTIN 100 MG: 100 CAPSULE ORAL at 03:11

## 2020-11-19 RX ADMIN — FOLIC ACID 1 MG: 1 TABLET ORAL at 08:11

## 2020-11-19 RX ADMIN — HYDROMORPHONE HYDROCHLORIDE 1 MG: 2 INJECTION INTRAMUSCULAR; INTRAVENOUS; SUBCUTANEOUS at 08:11

## 2020-11-19 RX ADMIN — GABAPENTIN 100 MG: 100 CAPSULE ORAL at 09:11

## 2020-11-19 RX ADMIN — HYDROMORPHONE HYDROCHLORIDE 1 MG: 2 INJECTION INTRAMUSCULAR; INTRAVENOUS; SUBCUTANEOUS at 10:11

## 2020-11-19 RX ADMIN — POTASSIUM PHOSPHATE, MONOBASIC 500 MG: 500 TABLET, SOLUBLE ORAL at 09:11

## 2020-11-19 RX ADMIN — PANTOPRAZOLE SODIUM 40 MG: 40 TABLET, DELAYED RELEASE ORAL at 09:11

## 2020-11-19 RX ADMIN — HYDROMORPHONE HYDROCHLORIDE 1 MG: 2 INJECTION INTRAMUSCULAR; INTRAVENOUS; SUBCUTANEOUS at 03:11

## 2020-11-19 RX ADMIN — Medication 100 MG: at 09:11

## 2020-11-19 RX ADMIN — GABAPENTIN 100 MG: 100 CAPSULE ORAL at 08:11

## 2020-11-19 RX ADMIN — THERA TABS 1 TABLET: TAB at 08:11

## 2020-11-19 RX ADMIN — FLUOXETINE 60 MG: 20 CAPSULE ORAL at 08:11

## 2020-11-19 RX ADMIN — BUPROPION HYDROCHLORIDE 100 MG: 100 TABLET, FILM COATED ORAL at 08:11

## 2020-11-19 RX ADMIN — OXYCODONE 10 MG: 5 TABLET ORAL at 12:11

## 2020-11-19 RX ADMIN — ENOXAPARIN SODIUM 40 MG: 40 INJECTION SUBCUTANEOUS at 05:11

## 2020-11-19 RX ADMIN — POTASSIUM PHOSPHATE, MONOBASIC 500 MG: 500 TABLET, SOLUBLE ORAL at 08:11

## 2020-11-19 RX ADMIN — TRAZODONE HYDROCHLORIDE 50 MG: 50 TABLET ORAL at 09:11

## 2020-11-19 NOTE — PT/OT/SLP PROGRESS
Occupational Therapy   Treatment    Name: Alicia Chu  MRN: [id number]  Admitting Diagnosis:  Fracture of right shoulder girdle, part unspecified, initial encounter for open fracture       Recommendations:     Discharge Recommendations: rehabilitation facility  Discharge Equipment Recommendations:  bedside commode, cane, straight, shower chair  Barriers to discharge:  (fall risk, will benefit from IPT)    Assessment:     Alicia Chu is a 60 y.o. female with a medical diagnosis of Fracture of right shoulder girdle, part unspecified, initial encounter for open fracture.   Performance deficits affecting function are weakness, impaired self care skills, impaired functional mobilty, gait instability, impaired balance, decreased upper extremity function, decreased lower extremity function, decreased safety awareness, pain, decreased ROM, orthopedic precautions, impaired endurance.    The patient is progressing in OT with improved pain control and functional mobility. The patient agrees with recommendation for IPR.          Rehab Prognosis:  Good; patient would benefit from acute skilled OT services to address these deficits and reach maximum level of function.       Plan:     Patient to be seen 5 x/week to address the above listed problems via therapeutic exercises, therapeutic activities, self-care/home management  · Plan of Care Expires: 12/01/20  · Plan of Care Reviewed with: patient    Subjective     Pain/Comfort:  · Pain Rating 1: 8/10  · Location - Side 1: Right  · Location 1: shoulder  · Pain Addressed 1: Cessation of Activity, Reposition  · Pain Addressed 2: Cessation of Activity    Objective:     Communicated with: nurse prior to session.  Patient found up in chair with telemetry(chair alarm) upon OT entry to room.    General Precautions: Standard, fall   Orthopedic Precautions:RUE non weight bearing   Braces: UE Sling(RUE)     Occupational Performance:     Bed Mobility:    · N/T     Functional  Mobility/Transfers:  · Patient completed Sit <> Stand Transfer with stand by assistance  with  straight cane   · Functional Mobility: The patient amb from chair<>sink using a cane with SBA and no LOB.    Activities of Daily Living:  · Grooming: supervision and stand by assistance to stand at the sink to brush her teeth and wash her hands and face. Efrain brady was able to use her right hand as a functional assist to open packages.  · Upper Body Dressing: minimum assistance to don/doff UE sling      [facility] 6 Click ADL: 20    Treatment & Education:  · The patient participated in self care and functional mobility as noted above,  · The patient was able to perform AROM to the right elbow flex/ext, forearm, wrist and hand x20 reps. The patient required verbal cues to achieve full right elbow ext    Patient left up in chair with all lines intact, call button in reach and chair alarm onEducation:      GOALS:   Multidisciplinary Problems     Occupational Therapy Goals        Problem: Occupational Therapy Goal    Goal Priority Disciplines Outcome Interventions   Occupational Therapy Goal     OT, PT/OT Ongoing, Progressing    Description: Goals to be met by: 12/1/2020    Patient will increase functional independence with ADLs by performing:    UE Dressing with Modified [place].  LE Dressing with Modified [place].  Grooming while standing at sink with Modified [place].  Toileting from toilet with Modified [place] for hygiene and clothing management.   Supine to sit with Modified [place].  Step transfer with Modified [place]  Toilet transfer to toilet with Modified [place].  Upper extremity exercise program x15 reps per handout, with independence.  LUE AROM/AAROM/PROM exercise program x15 reps w/ assistance as needed.                      Time Tracking:     OT Date of Treatment: 11/19/20  OT Start Time: 1136  OT Stop Time: 1200  OT Total Time (min): 24 min    Billable Minutes:Self Care/Home  Management 15  Therapeutic Exercise 9  Total Time 24    Sara Samaniego OT  11/19/2020

## 2020-11-19 NOTE — PROGRESS NOTES
"[facility] Medicine  Progress Note    Patient Name: Alicia Chu  MRN: [id number]  Patient Class: IP- Inpatient   Admission Date: 11/16/2020  Length of Stay: 3 days  Attending Physician: Ailyn Mccarthy MD  Primary Care Provider: Primary Doctor No        Subjective:     Principal Problem:Fracture of right shoulder girdle, part unspecified, initial encounter for open fracture        HPI:  Ms. Chu is a 61yo lady with a past medical history of ETOH abuse, HTN, gastric bypass and psychiatric issues related to mood disorder from ETOH abuse.  She was recently discharged from rehab on 11/5/20 after a 5 week stay due to alcohol abuse related leg weakness (she could not walk).     She did well after the stint in rehab until this past Saturday when she developed the sudden onset of dizziness.  Despite feeling dizzy, she decided to go up the stairs.  Unfortunately she made it to the 6th of 12 steps, then lost her balance and tumbled down the hardwood steps to the tiled floor below.  She struck her right should and head.  She states she did not pass out before or after the fall (did not lose consciousness).    She did, however, immediately have right shoulder pain, so she called 911.  They came and recommended that she go to the ED for evaluation.  She refused, stayed home, and decided to self medicate the shoulder pain with, "a few bloody yosvany."  She tells me, "these did nothing for the pain, so I finally decided to come in."    Her only other major complaint is nausea due to the pain.    Overview/Hospital Course:  Ms. Chu is a 61yo lady with a past medical history of ETOH abuse, HTN, gastric bypass and psychiatric issues related to mood disorder from ETOH abuse.  She was recently discharged from rehab on 11/5/20 after a 5 week stay due to alcohol abuse related leg weakness (she could not walk).   She did well after the stint in rehab until this past Saturday when she developed the " "sudden onset of dizziness.  Despite feeling dizzy, she decided to go up the stairs.  Unfortunately she made it to the 6th of 12 steps, then lost her balance and tumbled down the hardwood steps to the tiled floor below.  She struck her right should and head.  She states she did not pass out before or after the fall (did not lose consciousness).  She did, however, immediately have right shoulder pain, so she called 911.  They came and recommended that she go to the ED for evaluation.  She refused, stayed home, and decided to self medicate the shoulder pain with, "a few bloody yosvany."  She tells me, "these did nothing for the pain, so I finally decided to come in."  CT show,  Undisplaced fracture of the greater tuberosity. orthopedic is consulted,deceided non operative management.consulted PT,OT recommending rehab,consulted SW.  Patient denies dizziness,no sign of focal neuro deficiency.      Past Medical History:   Diagnosis Date    Addiction to drug     Alcohol abuse     Colon polyps     Gastric bypass status for obesity 2006    GIB (gastrointestinal bleeding)     History of psychiatric hospitalization     Hx of psychiatric care     Hypertension     Postmenopausal HRT (hormone replacement therapy)     Psychiatric problem     Therapy     Ulcer        Past Surgical History:   Procedure Laterality Date    ABDOMINAL SURGERY      BREAST BIOPSY  2005    right    CHOLECYSTECTOMY      ESOPHAGOGASTRODUODENOSCOPY N/A 4/18/2019    Procedure: EGD (ESOPHAGOGASTRODUODENOSCOPY);  Surgeon: Mony Plunkett MD;  [facility];  Service: Endoscopy;  Laterality: N/A;    ESOPHAGOGASTRODUODENOSCOPY N/A 8/4/2020    Procedure: EGD (ESOPHAGOGASTRODUODENOSCOPY);  Surgeon: Jose F Oreilly MD;  Location: [facility] ENDO;  Service: Endoscopy;  Laterality: N/A;    GASTRIC BYPASS  2006    zackary en Y    HERNIA REPAIR  x2    LI hernia    HERNIA REPAIR      INFECTED SKIN DEBRIDEMENT  x3    KNEE ARTHROSCOPY W/ DEBRIDEMENT      right " "      Review of patient's allergies indicates:   Allergen Reactions    Aspirin      Due to gastric bypass    Ibuprofen      Other reaction(s): Due To Gastric Bypass  Due to gastric bypass    Inderal [propranolol]      "Felt bizarre"       No current facility-administered medications on file prior to encounter.      Current Outpatient Medications on File Prior to Encounter   Medication Sig    buPROPion (WELLBUTRIN) 100 MG tablet Take 1 tablet (100 mg total) by mouth once daily.    folic acid (FOLVITE) 1 MG tablet Take 1 tablet (1 mg total) by mouth once daily.    gabapentin (NEURONTIN) 100 MG capsule Take 100 mg by mouth 3 (three) times daily.    hydrOXYzine pamoate (VISTARIL) 25 MG Cap     multivitamin Tab Take 1 tablet by mouth once daily.    thiamine 100 MG tablet Take 1 tablet (100 mg total) by mouth once daily.    trazodone HCl (TRAZODONE ORAL)     FLUoxetine 20 MG capsule Take 3 capsules (60 mg total) by mouth once daily. Also taking Fluoxetine 40 mg QAM    pantoprazole (PROTONIX) 40 MG tablet Take 1 tablet (40 mg total) by mouth 2 (two) times daily.     Family History     Problem Relation (Age of Onset)    Alzheimer's disease Father (78)    Breast cancer Maternal Uncle    Cancer Mother, Paternal Grandfather    Dementia Father    Hyperlipidemia Father    Hypertension Father        Tobacco Use    Smoking status: Never Smoker    Smokeless tobacco: Never Used   Substance and Sexual Activity    Alcohol use: Yes     Alcohol/week: 0.0 standard drinks     Comment: 1 small bottle wine daily (10oz) + 4 strong vodka cocktails daily    Drug use: No    Sexual activity: Yes     Partners: Male     Birth control/protection: None     Review of Systems   Constitutional: Positive for activity change and appetite change. Negative for chills, fatigue and fever.   HENT: Negative for congestion.    Eyes: Negative for photophobia.   Respiratory: Negative for cough and shortness of breath.    Cardiovascular: Negative " for chest pain.   Gastrointestinal: Negative for abdominal pain, constipation, diarrhea and vomiting.   Endocrine: Negative for heat intolerance.   Genitourinary: Negative for dysuria.   Musculoskeletal: Positive for arthralgias and gait problem.   Skin: Negative for rash.   Allergic/Immunologic: Negative for immunocompromised state.   Neurological: Positive for light-headedness. Negative for dizziness and weakness.   Hematological: Does not bruise/bleed easily.   Psychiatric/Behavioral: Positive for dysphoric mood. Negative for confusion. The patient is not nervous/anxious.      Objective:     Vital Signs (Most Recent):  Temp: 98.8 °F (37.1 °C) (11/19/20 0707)  Pulse: 91 (11/19/20 0707)  Resp: 20 (11/19/20 0820)  BP: 139/85 (11/19/20 0707)  SpO2: 96 % (11/19/20 0707) Vital Signs (24h Range):  Temp:  [97.8 °F (36.6 °C)-99.2 °F (37.3 °C)] 98.8 °F (37.1 °C)  Pulse:  [74-92] 91  Resp:  [16-20] 20  SpO2:  [95 %-100 %] 96 %  BP: (121-139)/(71-85) 139/85     Weight: 70.3 kg (155 lb)  Body mass index is 25.79 kg/m².    Physical Exam  Vitals signs and nursing note reviewed.   Constitutional:       General: She is not in acute distress.     Appearance: She is well-developed. She is not ill-appearing, toxic-appearing or diaphoretic.   HENT:      Head: Normocephalic and atraumatic.      Mouth/Throat:      Pharynx: No oropharyngeal exudate.   Eyes:      General: No scleral icterus.        Right eye: No discharge.         Left eye: No discharge.      Conjunctiva/sclera: Conjunctivae normal.      Pupils: Pupils are equal, round, and reactive to light.   Neck:      Musculoskeletal: Normal range of motion and neck supple.      Thyroid: No thyromegaly.      Vascular: No JVD.      Trachea: No tracheal deviation.   Cardiovascular:      Rate and Rhythm: Normal rate and regular rhythm.      Heart sounds: Normal heart sounds. No murmur. No friction rub. No gallop.    Pulmonary:      Effort: Pulmonary effort is normal. No respiratory  distress.      Breath sounds: Normal breath sounds. No stridor. No decreased breath sounds, wheezing, rhonchi or rales.   Chest:      Chest wall: No tenderness.   Abdominal:      General: Bowel sounds are normal. There is no distension.      Palpations: Abdomen is soft. There is no mass.      Tenderness: There is no abdominal tenderness. There is no guarding or rebound.   Genitourinary:     Comments: No singh  Musculoskeletal:      Right shoulder: She exhibits decreased range of motion, tenderness, bony tenderness and swelling.   Lymphadenopathy:      Cervical: No cervical adenopathy.      Comments: No peripheral edema   Skin:     General: Skin is warm and dry.      Coloration: Skin is not pale.      Findings: No erythema or rash.   Neurological:      Mental Status: She is alert and oriented to person, place, and time.      GCS: GCS eye subscore is 4. GCS verbal subscore is 5. GCS motor subscore is 6.      Cranial Nerves: No cranial nerve deficit.      Motor: No abnormal muscle tone.      Coordination: Coordination normal.   Psychiatric:         Mood and Affect: Mood is depressed. Affect is flat.         Behavior: Behavior normal.         Thought Content: Thought content normal.         Cognition and Memory: Cognition and memory normal.         Judgment: Judgment normal.           CRANIAL NERVES     CN III, IV, VI   Pupils are equal, round, and reactive to light.       Significant Labs:   Recent Results (from the past 24 hour(s))   Basic Metabolic Panel (BMP)    Collection Time: 11/19/20  4:13 AM   Result Value Ref Range    Sodium 137 136 - 145 mmol/L    Potassium 3.5 3.5 - 5.1 mmol/L    Chloride 109 95 - 110 mmol/L    CO2 26 23 - 29 mmol/L    Glucose 89 70 - 110 mg/dL    BUN 9 6 - 20 mg/dL    Creatinine 0.8 0.5 - 1.4 mg/dL    Calcium 7.9 (L) 8.7 - 10.5 mg/dL    Anion Gap 2 (L) 8 - 16 mmol/L    eGFR if African American >60 >60 mL/min/1.73 m^2    eGFR if non African American >60 >60 mL/min/1.73 m^2   Phosphorus     Collection Time: 11/19/20  4:13 AM   Result Value Ref Range    Phosphorus 2.5 (L) 2.7 - 4.5 mg/dL   Magnesium    Collection Time: 11/19/20  4:13 AM   Result Value Ref Range    Magnesium 1.6 1.6 - 2.6 mg/dL   CBC with Automated Differential    Collection Time: 11/19/20  4:13 AM   Result Value Ref Range    WBC 3.04 (L) 3.90 - 12.70 K/uL    RBC 3.08 (L) 4.00 - 5.40 M/uL    Hemoglobin 9.2 (L) 12.0 - 16.0 g/dL    Hematocrit 29.7 (L) 37.0 - 48.5 %    MCV 96 82 - 98 fL    MCH 29.9 27.0 - 31.0 pg    MCHC 31.0 (L) 32.0 - 36.0 g/dL    RDW 16.7 (H) 11.5 - 14.5 %    Platelets 151 150 - 350 K/uL    MPV 11.0 9.2 - 12.9 fL    Immature Granulocytes 0.7 (H) 0.0 - 0.5 %    Gran # (ANC) 1.6 (L) 1.8 - 7.7 K/uL    Immature Grans (Abs) 0.02 0.00 - 0.04 K/uL    Lymph # 0.9 (L) 1.0 - 4.8 K/uL    Mono # 0.3 0.3 - 1.0 K/uL    Eos # 0.2 0.0 - 0.5 K/uL    Baso # 0.02 0.00 - 0.20 K/uL    nRBC 0 0 /100 WBC    Gran % 50.9 38.0 - 73.0 %    Lymph % 30.6 18.0 - 48.0 %    Mono % 10.5 4.0 - 15.0 %    Eosinophil % 6.6 0.0 - 8.0 %    Basophil % 0.7 0.0 - 1.9 %    Differential Method Automated      Significant Imaging:   XR CHEST PA AND LATERAL  FINDINGS:  Cardiac silhouette and pulmonary vascularity are within normal limits.  Lungs are symmetrically expanded and show no evidence of significant focal consolidation, large effusion or pneumothorax.  Bones demonstrated a remote right clavicular fracture.  No evidence of acute fracture.  There are cholecystectomy clips.     Impression:   No acute radiographic findings in the chest.      Electronically signed by: Thiago Smiley MD  Date:                                            11/16/2020  Time:                                           17:13    XR SHOULDER COMPLETE 2 OR MORE VIEWS RIGHT  FINDINGS:  There is a remote fracture of the right distal clavicle.  There are degenerative changes of the acromioclavicular joint.  There is no evidence of acute fracture, dislocation or bone destruction.  Surrounding soft  tissue structures show no acute abnormalities.     Impression:   No acute bony abnormalities.  Remote distal clavicular fracture.      Electronically signed by: Thiago Smiley MD  Date:                                            11/16/2020  Time:                                           17:18    16-NOV-2020 18:05:24 EKG   Normal sinus rhythm  Vent. rate 87 BPM  WV interval 138 ms  QRS duration 84 ms   QTc 390 ms (Manual calculation by Bazett measurement)   QTc  534 ms (EKG computer calculation)  Prolonged QT  Abnormal ECG  When compared with ECG of 03-SEP-2020 13:11,  Significant changes have occurred    16-NOV-2020 20:38:37 EKG    Normal sinus rhythm  Vent. rate 88 BPM  WV interval 130 ms  QRS duration 84 ms   QTc  412 ms (manual calculation by Bazett measurement)    QTc 634 ms (EKG computer calculation)  Prolonged QT  Abnormal ECG  When compared with ECG of 16-NOV-2020 18:05,  Significant changes have occurred        Assessment/Plan:      * Fracture of right shoulder girdle, part unspecified, initial encounter for open fracture  CT of the right shoulder revealed:  -There is a nondisplaced fracture of the greater tuberosity.    -There is a remote fracture involving the distal clavicle, which is healed.    -There is no shoulder dislocation.   PT and OT consult  Ortho consult  Sling  Pain control,     orthopedic is consulted,deceided non operative management.consulted PT,OT recommending rehab,consulted SW.  Patient denies dizziness,no sign of focal neuro deficiency.    Alcoholism  She has chronic ETOH mood disorder treated with:   -Trazadone 100mg po qhs   -Fluoxetine 60mg po qday   -Wellbutrin 100mg po qday  In the past 7 weeks, she has only had one bloody [place]      Fall down stairs  Ms. Chu fell down 6 steps after becoming dizzy at home this past Saturday  She never lost consciousness  She states that she did strike her head but was fine afterwards  CT head not indicated in minor head trauma with normal mental  status   -Trauma was also almost 3 days ago      Prolonged QT interval  The computer is reading out her QTc to be in the 600's  On manual calculation using Bazett's, her QTc is consistently around 400      Hypomagnesemia  Her Mg was 1.5 on arrival, increased to 2.1 after 2g iv MgSO4      Hypokalemia  She had an Istat of 2.8, was given 60 meq KCl PO  Her repeat K was 3.8        VTE Risk Mitigation (From admission, onward)         Ordered     enoxaparin injection 40 mg  Every 24 hours      11/18/20 1057     Place SENAIT hose  Until discontinued      11/16/20 2318     Place sequential compression device  Until discontinued      11/16/20 2318     IP VTE LOW RISK PATIENT  Once      11/16/20 2318                Discharge Planning   FARZANA: 11/18/2020     Code Status: Full Code   Is the patient medically ready for discharge?:     Reason for patient still in hospital (select all that apply): Patient trending condition  Discharge Plan A: Rehab   Discharge Delays: None known at this time              Ailyn Mccarthy MD  Department of Hospital Medicine   [facility]

## 2020-11-19 NOTE — ASSESSMENT & PLAN NOTE
CT of the right shoulder revealed:  -There is a nondisplaced fracture of the greater tuberosity.    -There is a remote fracture involving the distal clavicle, which is healed.    -There is no shoulder dislocation.   PT and OT consult  Ortho consult  Sling  Pain control,     orthopedic is consulted,deceided non operative management.consulted PT,OT recommending rehab,consulted SW.  Patient denies dizziness,no sign of focal neuro deficiency.

## 2020-11-19 NOTE — PT/OT/SLP PROGRESS
Physical Therapy Treatment    Patient Name:  Alicia Chu   MRN:  [id number]    Recommendations:     Discharge Recommendations:  rehabilitation facility   Discharge Equipment Recommendations: shower chair, bedside commode, cane, straight   Barriers to discharge: Inaccessible home . Gt instability and impaired balance increased fall risk.    Assessment:     Alicia Chu is a 60 y.o. female admitted with a medical diagnosis of Fracture of right shoulder girdle, part unspecified, initial encounter for open fracture.  She presents with the following impairments/functional limitations:  weakness, impaired endurance, impaired self care skills, impaired functional mobilty, gait instability, impaired balance, decreased lower extremity function, impaired cardiopulmonary response to activity.    Pt with improved amb today with SPC with increased gt distance. Amb 100 ft x 2 with SPC with 2 seated rest breaks before approaching the stairs. Pt able to asc/desc 1 flt of stairs with CGA  /c  R HR to ascend. Pt needed rest break at top of stairs. Pt had c/o increased fatigue and slight dizziness which subsided with rest.    Rehab Prognosis: Good; patient would benefit from acute skilled PT services to address these deficits and reach maximum level of function.    Recent Surgery: * No surgery found *      Plan:     During this hospitalization, patient to be seen (5-6x/wk) to address the identified rehab impairments via gait training, therapeutic activities, therapeutic exercises and progress toward the following goals:    · Plan of Care Expires:  12/01/20    Subjective     Chief Complaint: I feel better today  Patient/Family Comments/goals: pt reports feeling stronger today.  Pain/Comfort:  · Pain Rating 1: 0/10  · Pain Rating Post-Intervention 1: 0/10      Objective:     Communicated with nurse prior to session.  Patient found HOB elevated with telemetry, bed alarm, peripheral IV upon PT entry to room.     General Precautions:  Standard, fall   Orthopedic Precautions:RUE non weight bearing(Sling for comfort)   Braces: N/A     Functional Mobility:  · Bed Mobility:     · Rolling Left:  modified independence  · Scooting: supervision  · Bridging: modified independence  · Supine to Sit: supervision  · Transfers:     · Sit to Stand:  stand by assistance with no AD  · Gait: 100 ft with SPC with CGA with decreased barry, decreased step length and height. no LOB. pt needed seated rest break due to increased fatigue.  · Balance: dynamic standing F+  · Stairs:  Pt ascended/descended 1 flight(s) with No Assistive Device with right handrail with Contact Guard Assistance.       AM-PAC 6 CLICK MOBILITY  Turning over in bed (including adjusting bedclothes, sheets and blankets)?: 4  Sitting down on and standing up from a chair with arms (e.g., wheelchair, bedside commode, etc.): 4  Moving from lying on back to sitting on the side of the bed?: 4  Moving to and from a bed to a chair (including a wheelchair)?: 4  Need to walk in hospital room?: 3  Climbing 3-5 steps with a railing?: 3  Basic Mobility Total Score: 22       Therapeutic Activities and Exercises:   SAT eob x 5 mins with G balance and no c/o dizziness    Patient left up in chair with all lines intact, call button in reach, chair alarm on and nurse notified..    GOALS:   Multidisciplinary Problems     Physical Therapy Goals        Problem: Physical Therapy Goal    Goal Priority Disciplines Outcome Goal Variances Interventions   Physical Therapy Goal     PT, PT/OT Ongoing, Progressing     Description: Goals to be met by: 20     Patient will increase functional independence with mobility by performin. Sit to stand transfer with Modified [place]  2. Gait  x 150 feet with Modified [place] using Rolling Walker.   3. Lower extremity exercise program x10 reps per handout, with independence  4.10 stairs with HR and SBA                       Time Tracking:     PT Received On:  11/19/20  PT Start Time: 1045     PT Stop Time: 1115  PT Total Time (min): 30 min     Billable Minutes: Gait Training 15 therapeutic activities 15    Treatment Type: 6th Visit  PT/PTA: PTA     PTA Visit Number: 1     Yonny Ramirez PTA  11/19/2020

## 2020-11-19 NOTE — SUBJECTIVE & OBJECTIVE
"Past Medical History:   Diagnosis Date    Addiction to drug     Alcohol abuse     Colon polyps     Gastric bypass status for obesity 2006    GIB (gastrointestinal bleeding)     History of psychiatric hospitalization     Hx of psychiatric care     Hypertension     Postmenopausal HRT (hormone replacement therapy)     Psychiatric problem     Therapy     Ulcer        Past Surgical History:   Procedure Laterality Date    ABDOMINAL SURGERY      BREAST BIOPSY  2005    right    CHOLECYSTECTOMY      ESOPHAGOGASTRODUODENOSCOPY N/A 4/18/2019    Procedure: EGD (ESOPHAGOGASTRODUODENOSCOPY);  Surgeon: Mony Plunkett MD;  Location: [facility] ENDO;  Service: Endoscopy;  Laterality: N/A;    ESOPHAGOGASTRODUODENOSCOPY N/A 8/4/2020    Procedure: EGD (ESOPHAGOGASTRODUODENOSCOPY);  Surgeon: Jose F Oreilly MD;  Location: [facility] ENDO;  Service: Endoscopy;  Laterality: N/A;    GASTRIC BYPASS  2006    zackary en Y    HERNIA REPAIR  x2    LI hernia    HERNIA REPAIR      INFECTED SKIN DEBRIDEMENT  x3    KNEE ARTHROSCOPY W/ DEBRIDEMENT      right       Review of patient's allergies indicates:   Allergen Reactions    Aspirin      Due to gastric bypass    Ibuprofen      Other reaction(s): Due To Gastric Bypass  Due to gastric bypass    Inderal [propranolol]      "Felt bizarre"       No current facility-administered medications on file prior to encounter.      Current Outpatient Medications on File Prior to Encounter   Medication Sig    buPROPion (WELLBUTRIN) 100 MG tablet Take 1 tablet (100 mg total) by mouth once daily.    folic acid (FOLVITE) 1 MG tablet Take 1 tablet (1 mg total) by mouth once daily.    gabapentin (NEURONTIN) 100 MG capsule Take 100 mg by mouth 3 (three) times daily.    hydrOXYzine pamoate (VISTARIL) 25 MG Cap     multivitamin Tab Take 1 tablet by mouth once daily.    thiamine 100 MG tablet Take 1 tablet (100 mg total) by mouth once daily.    trazodone HCl (TRAZODONE ORAL)     FLUoxetine 20 MG capsule Take " 3 capsules (60 mg total) by mouth once daily. Also taking Fluoxetine 40 mg QAM    pantoprazole (PROTONIX) 40 MG tablet Take 1 tablet (40 mg total) by mouth 2 (two) times daily.     Family History     Problem Relation (Age of Onset)    Alzheimer's disease Father (78)    Breast cancer Maternal Uncle    Cancer Mother, Paternal Grandfather    Dementia Father    Hyperlipidemia Father    Hypertension Father        Tobacco Use    Smoking status: Never Smoker    Smokeless tobacco: Never Used   Substance and Sexual Activity    Alcohol use: Yes     Alcohol/week: 0.0 standard drinks     Comment: 1 small bottle wine daily (10oz) + 4 strong vodka cocktails daily    Drug use: No    Sexual activity: Yes     Partners: Male     Birth control/protection: None     Review of Systems   Constitutional: Positive for activity change and appetite change. Negative for chills, fatigue and fever.   HENT: Negative for congestion.    Eyes: Negative for photophobia.   Respiratory: Negative for cough and shortness of breath.    Cardiovascular: Negative for chest pain.   Gastrointestinal: Negative for abdominal pain, constipation, diarrhea and vomiting.   Endocrine: Negative for heat intolerance.   Genitourinary: Negative for dysuria.   Musculoskeletal: Positive for arthralgias and gait problem.   Skin: Negative for rash.   Allergic/Immunologic: Negative for immunocompromised state.   Neurological: Positive for light-headedness. Negative for dizziness and weakness.   Hematological: Does not bruise/bleed easily.   Psychiatric/Behavioral: Positive for dysphoric mood. Negative for confusion. The patient is not nervous/anxious.      Objective:     Vital Signs (Most Recent):  Temp: 98.8 °F (37.1 °C) (11/19/20 0707)  Pulse: 91 (11/19/20 0707)  Resp: 20 (11/19/20 0820)  BP: 139/85 (11/19/20 0707)  SpO2: 96 % (11/19/20 0707) Vital Signs (24h Range):  Temp:  [97.8 °F (36.6 °C)-99.2 °F (37.3 °C)] 98.8 °F (37.1 °C)  Pulse:  [74-92] 91  Resp:  [16-20]  20  SpO2:  [95 %-100 %] 96 %  BP: (121-139)/(71-85) 139/85     Weight: 70.3 kg (155 lb)  Body mass index is 25.79 kg/m².    Physical Exam  Vitals signs and nursing note reviewed.   Constitutional:       General: She is not in acute distress.     Appearance: She is well-developed. She is not ill-appearing, toxic-appearing or diaphoretic.   HENT:      Head: Normocephalic and atraumatic.      Mouth/Throat:      Pharynx: No oropharyngeal exudate.   Eyes:      General: No scleral icterus.        Right eye: No discharge.         Left eye: No discharge.      Conjunctiva/sclera: Conjunctivae normal.      Pupils: Pupils are equal, round, and reactive to light.   Neck:      Musculoskeletal: Normal range of motion and neck supple.      Thyroid: No thyromegaly.      Vascular: No JVD.      Trachea: No tracheal deviation.   Cardiovascular:      Rate and Rhythm: Normal rate and regular rhythm.      Heart sounds: Normal heart sounds. No murmur. No friction rub. No gallop.    Pulmonary:      Effort: Pulmonary effort is normal. No respiratory distress.      Breath sounds: Normal breath sounds. No stridor. No decreased breath sounds, wheezing, rhonchi or rales.   Chest:      Chest wall: No tenderness.   Abdominal:      General: Bowel sounds are normal. There is no distension.      Palpations: Abdomen is soft. There is no mass.      Tenderness: There is no abdominal tenderness. There is no guarding or rebound.   Genitourinary:     Comments: No singh  Musculoskeletal:      Right shoulder: She exhibits decreased range of motion, tenderness, bony tenderness and swelling.   Lymphadenopathy:      Cervical: No cervical adenopathy.      Comments: No peripheral edema   Skin:     General: Skin is warm and dry.      Coloration: Skin is not pale.      Findings: No erythema or rash.   Neurological:      Mental Status: She is alert and oriented to person, place, and time.      GCS: GCS eye subscore is 4. GCS verbal subscore is 5. GCS motor subscore  is 6.      Cranial Nerves: No cranial nerve deficit.      Motor: No abnormal muscle tone.      Coordination: Coordination normal.   Psychiatric:         Mood and Affect: Mood is depressed. Affect is flat.         Behavior: Behavior normal.         Thought Content: Thought content normal.         Cognition and Memory: Cognition and memory normal.         Judgment: Judgment normal.           CRANIAL NERVES     CN III, IV, VI   Pupils are equal, round, and reactive to light.       Significant Labs:   Recent Results (from the past 24 hour(s))   Basic Metabolic Panel (BMP)    Collection Time: 11/19/20  4:13 AM   Result Value Ref Range    Sodium 137 136 - 145 mmol/L    Potassium 3.5 3.5 - 5.1 mmol/L    Chloride 109 95 - 110 mmol/L    CO2 26 23 - 29 mmol/L    Glucose 89 70 - 110 mg/dL    BUN 9 6 - 20 mg/dL    Creatinine 0.8 0.5 - 1.4 mg/dL    Calcium 7.9 (L) 8.7 - 10.5 mg/dL    Anion Gap 2 (L) 8 - 16 mmol/L    eGFR if African American >60 >60 mL/min/1.73 m^2    eGFR if non African American >60 >60 mL/min/1.73 m^2   Phosphorus    Collection Time: 11/19/20  4:13 AM   Result Value Ref Range    Phosphorus 2.5 (L) 2.7 - 4.5 mg/dL   Magnesium    Collection Time: 11/19/20  4:13 AM   Result Value Ref Range    Magnesium 1.6 1.6 - 2.6 mg/dL   CBC with Automated Differential    Collection Time: 11/19/20  4:13 AM   Result Value Ref Range    WBC 3.04 (L) 3.90 - 12.70 K/uL    RBC 3.08 (L) 4.00 - 5.40 M/uL    Hemoglobin 9.2 (L) 12.0 - 16.0 g/dL    Hematocrit 29.7 (L) 37.0 - 48.5 %    MCV 96 82 - 98 fL    MCH 29.9 27.0 - 31.0 pg    MCHC 31.0 (L) 32.0 - 36.0 g/dL    RDW 16.7 (H) 11.5 - 14.5 %    Platelets 151 150 - 350 K/uL    MPV 11.0 9.2 - 12.9 fL    Immature Granulocytes 0.7 (H) 0.0 - 0.5 %    Gran # (ANC) 1.6 (L) 1.8 - 7.7 K/uL    Immature Grans (Abs) 0.02 0.00 - 0.04 K/uL    Lymph # 0.9 (L) 1.0 - 4.8 K/uL    Mono # 0.3 0.3 - 1.0 K/uL    Eos # 0.2 0.0 - 0.5 K/uL    Baso # 0.02 0.00 - 0.20 K/uL    nRBC 0 0 /100 WBC    Gran % 50.9 38.0 -  73.0 %    Lymph % 30.6 18.0 - 48.0 %    Mono % 10.5 4.0 - 15.0 %    Eosinophil % 6.6 0.0 - 8.0 %    Basophil % 0.7 0.0 - 1.9 %    Differential Method Automated      Significant Imaging:   XR CHEST PA AND LATERAL  FINDINGS:  Cardiac silhouette and pulmonary vascularity are within normal limits.  Lungs are symmetrically expanded and show no evidence of significant focal consolidation, large effusion or pneumothorax.  Bones demonstrated a remote right clavicular fracture.  No evidence of acute fracture.  There are cholecystectomy clips.     Impression:   No acute radiographic findings in the chest.      Electronically signed by: Thiago Smiley MD  Date:                                            11/16/2020  Time:                                           17:13    XR SHOULDER COMPLETE 2 OR MORE VIEWS RIGHT  FINDINGS:  There is a remote fracture of the right distal clavicle.  There are degenerative changes of the acromioclavicular joint.  There is no evidence of acute fracture, dislocation or bone destruction.  Surrounding soft tissue structures show no acute abnormalities.     Impression:   No acute bony abnormalities.  Remote distal clavicular fracture.      Electronically signed by: Thiago Smiley MD  Date:                                            11/16/2020  Time:                                           17:18    16-NOV-2020 18:05:24 EKG   Normal sinus rhythm  Vent. rate 87 BPM  SC interval 138 ms  QRS duration 84 ms   QTc 390 ms (Manual calculation by Bazett measurement)   QTc  534 ms (EKG computer calculation)  Prolonged QT  Abnormal ECG  When compared with ECG of 03-SEP-2020 13:11,  Significant changes have occurred    16-NOV-2020 20:38:37 EKG    Normal sinus rhythm  Vent. rate 88 BPM  SC interval 130 ms  QRS duration 84 ms   QTc  412 ms (manual calculation by Bazett measurement)    QTc 634 ms (EKG computer calculation)  Prolonged QT  Abnormal ECG  When compared with ECG of 16-NOV-2020 18:05,  Significant changes  have occurred

## 2020-11-19 NOTE — PLAN OF CARE
Problem: Physical Therapy Goal  Goal: Physical Therapy Goal  Description: Goals to be met by: 20     Patient will increase functional independence with mobility by performin. Sit to stand transfer with Modified [place]  2. Gait  x 150 feet with Modified [place] using Rolling Walker.   3. Lower extremity exercise program x10 reps per handout, with independence  4.10 stairs with HR and SBA      Outcome: Ongoing, Progressing   Pt with improved amb today with SPC with increased gt distance. Amb 100 ft x 2 with SPC with 2 seated rest breaks before approaching the stairs. Pt able to asc/desc 1 flt of stairs with CGA  /c  R HR to ascend. Pt needed rest break at top of stairs. Pt had c/o increased fatigue and slight dizziness which subsided with rest.

## 2020-11-19 NOTE — PLAN OF CARE
Problem: Occupational Therapy Goal  Goal: Occupational Therapy Goal  Description: Goals to be met by: 12/1/2020    Patient will increase functional independence with ADLs by performing:    UE Dressing with Modified [place].  LE Dressing with Modified [place].  Grooming while standing at sink with Modified [place].  Toileting from toilet with Modified [place] for hygiene and clothing management.   Supine to sit with Modified [place].  Step transfer with Modified [place]  Toilet transfer to toilet with Modified [place].  Upper extremity exercise program x15 reps per handout, with independence.  LUE AROM/AAROM/PROM exercise program x15 reps w/ assistance as needed.     Outcome: Ongoing, Progressing   The patient is progressing in OT with improved pain control and functional mobility. The patient agrees with recommendation for IPR.

## 2020-11-20 VITALS
HEART RATE: 77 BPM | DIASTOLIC BLOOD PRESSURE: 63 MMHG | SYSTOLIC BLOOD PRESSURE: 131 MMHG | BODY MASS INDEX: 25.83 KG/M2 | WEIGHT: 155 LBS | TEMPERATURE: 98 F | OXYGEN SATURATION: 97 % | HEIGHT: 65 IN | RESPIRATION RATE: 18 BRPM

## 2020-11-20 LAB
ANION GAP SERPL CALC-SCNC: 7 MMOL/L (ref 8–16)
BASOPHILS # BLD AUTO: 0.04 K/UL (ref 0–0.2)
BASOPHILS NFR BLD: 1.3 % (ref 0–1.9)
BUN SERPL-MCNC: 9 MG/DL (ref 6–20)
CALCIUM SERPL-MCNC: 8.1 MG/DL (ref 8.7–10.5)
CHLORIDE SERPL-SCNC: 109 MMOL/L (ref 95–110)
CO2 SERPL-SCNC: 24 MMOL/L (ref 23–29)
CREAT SERPL-MCNC: 0.8 MG/DL (ref 0.5–1.4)
DIFFERENTIAL METHOD: ABNORMAL
EOSINOPHIL # BLD AUTO: 0.2 K/UL (ref 0–0.5)
EOSINOPHIL NFR BLD: 4.8 % (ref 0–8)
ERYTHROCYTE [DISTWIDTH] IN BLOOD BY AUTOMATED COUNT: 16.9 % (ref 11.5–14.5)
EST. GFR  (AFRICAN AMERICAN): >60 ML/MIN/1.73 M^2
EST. GFR  (NON AFRICAN AMERICAN): >60 ML/MIN/1.73 M^2
GLUCOSE SERPL-MCNC: 84 MG/DL (ref 70–110)
HCT VFR BLD AUTO: 29.8 % (ref 37–48.5)
HGB BLD-MCNC: 9.4 G/DL (ref 12–16)
IMM GRANULOCYTES # BLD AUTO: 0.01 K/UL (ref 0–0.04)
IMM GRANULOCYTES NFR BLD AUTO: 0.3 % (ref 0–0.5)
LYMPHOCYTES # BLD AUTO: 1 K/UL (ref 1–4.8)
LYMPHOCYTES NFR BLD: 32.2 % (ref 18–48)
MAGNESIUM SERPL-MCNC: 1.6 MG/DL (ref 1.6–2.6)
MCH RBC QN AUTO: 30 PG (ref 27–31)
MCHC RBC AUTO-ENTMCNC: 31.5 G/DL (ref 32–36)
MCV RBC AUTO: 95 FL (ref 82–98)
MONOCYTES # BLD AUTO: 0.4 K/UL (ref 0.3–1)
MONOCYTES NFR BLD: 12.9 % (ref 4–15)
NEUTROPHILS # BLD AUTO: 1.5 K/UL (ref 1.8–7.7)
NEUTROPHILS NFR BLD: 48.5 % (ref 38–73)
NRBC BLD-RTO: 0 /100 WBC
PHOSPHATE SERPL-MCNC: 3 MG/DL (ref 2.7–4.5)
PLATELET # BLD AUTO: 167 K/UL (ref 150–350)
PMV BLD AUTO: 11.5 FL (ref 9.2–12.9)
POTASSIUM SERPL-SCNC: 4.1 MMOL/L (ref 3.5–5.1)
RBC # BLD AUTO: 3.13 M/UL (ref 4–5.4)
SODIUM SERPL-SCNC: 140 MMOL/L (ref 136–145)
WBC # BLD AUTO: 3.11 K/UL (ref 3.9–12.7)

## 2020-11-20 PROCEDURE — 97530 THERAPEUTIC ACTIVITIES: CPT

## 2020-11-20 PROCEDURE — 25000003 PHARM REV CODE 250: Performed by: INTERNAL MEDICINE

## 2020-11-20 PROCEDURE — 83735 ASSAY OF MAGNESIUM: CPT

## 2020-11-20 PROCEDURE — 63600175 PHARM REV CODE 636 W HCPCS: Performed by: HOSPITALIST

## 2020-11-20 PROCEDURE — 97110 THERAPEUTIC EXERCISES: CPT

## 2020-11-20 PROCEDURE — 25000003 PHARM REV CODE 250: Performed by: HOSPITALIST

## 2020-11-20 PROCEDURE — 80048 BASIC METABOLIC PNL TOTAL CA: CPT

## 2020-11-20 PROCEDURE — 97535 SELF CARE MNGMENT TRAINING: CPT

## 2020-11-20 PROCEDURE — 36415 COLL VENOUS BLD VENIPUNCTURE: CPT

## 2020-11-20 PROCEDURE — 84100 ASSAY OF PHOSPHORUS: CPT

## 2020-11-20 PROCEDURE — 85025 COMPLETE CBC W/AUTO DIFF WBC: CPT

## 2020-11-20 RX ORDER — OXYCODONE HYDROCHLORIDE 5 MG/1
5 TABLET ORAL EVERY 6 HOURS PRN
Qty: 20 TABLET | Refills: 0 | Status: SHIPPED | OUTPATIENT
Start: 2020-11-20 | End: 2020-11-26

## 2020-11-20 RX ADMIN — HYDROMORPHONE HYDROCHLORIDE 1 MG: 2 INJECTION INTRAMUSCULAR; INTRAVENOUS; SUBCUTANEOUS at 09:11

## 2020-11-20 RX ADMIN — Medication 100 MG: at 09:11

## 2020-11-20 RX ADMIN — BUPROPION HYDROCHLORIDE 100 MG: 100 TABLET, FILM COATED ORAL at 09:11

## 2020-11-20 RX ADMIN — FOLIC ACID 1 MG: 1 TABLET ORAL at 09:11

## 2020-11-20 RX ADMIN — THERA TABS 1 TABLET: TAB at 09:11

## 2020-11-20 RX ADMIN — FLUOXETINE 60 MG: 20 CAPSULE ORAL at 09:11

## 2020-11-20 RX ADMIN — GABAPENTIN 100 MG: 100 CAPSULE ORAL at 09:11

## 2020-11-20 RX ADMIN — POTASSIUM PHOSPHATE, MONOBASIC 500 MG: 500 TABLET, SOLUBLE ORAL at 09:11

## 2020-11-20 RX ADMIN — PANTOPRAZOLE SODIUM 40 MG: 40 TABLET, DELAYED RELEASE ORAL at 09:11

## 2020-11-20 NOTE — CONSULTS
CM referral received to arrange a follow up appt to see orthopedic in 1 week; [place] contacted Dr Hinkle/Dr Lee's office; was told that d/t patient's insurance (Medicaid) they will not be able to schedule an appt since pt did not have a surgical procedure; TN inquired about a one time courtesy visit since patient was seen in the hospital by Dr Lee; stated that they will ask MD and return call; in meantime, pt was referred to [facility] with instructions to inquire about referral to see Orthopedic doctor; TN will contact patient if Dr Hinkle's office sai a courtesy visit    1700: received call from Sharlene at [facility]; stated that Dr Lee has agreed to see patient with a Medicaid authorization; requested pt's Medicaid and PCP information; stated that she will contact patient to arrange appointment

## 2020-11-20 NOTE — PLAN OF CARE
Received call from patient stating that she is opting to go home with outpatient therapy instead of moving forward with IP rehab admission; MD notified; referrals cancelled via [facility]       11/20/20 3959   Post-Acute Status   Post-Acute Placement Status Discharge Plan Changed   Discharge Delays None known at this time   Discharge Plan   Discharge Plan A Home with family

## 2020-11-20 NOTE — PLAN OF CARE
WRITTEN HEALTHCARE DISCHARGE INFORMATION      Things that YOU are responsible for to Manage Your Care At Home:  1. Getting your prescriptions filled.  2. Taking you medications as directed. DO NOT MISS ANY DOSES!  3. Going to your follow-up doctor appointments. This is important because it allows the doctor to monitor your progress and to determine if any changes need to be made to your treatment plan.     If you are unable to make your follow up appointments, please call the number listed and reschedule this appointment.      After discharge, if you need assistance, you can call [facility] On [facility] Line for 24/7 assistance at [phone]     If you are experience any signs or symptoms, Call your doctor or Call 911 and come to your nearest Emergency Room.     Thank you for choosing [facility] and allowing us to care for you.        You should receive a call from [facility] Discharge Department within 48-72 hours to help manage your care after discharge. Please try to make sure that you answer your phone for this important phone call.     Follow-up Information     Primary Doctor Nacho Monson In 1 week.    Why: Please call clinic to establish care/arrange hospital discharge follow up visit in 1 week; please inquire about referral to see Orthopedic Doctor  Contact information:  [address] [place] [address]  [phone]

## 2020-11-20 NOTE — PLAN OF CARE
Problem: Occupational Therapy Goal  Goal: Occupational Therapy Goal  Description: Goals to be met by: 12/1/2020    Patient will increase functional independence with ADLs by performing:    UE Dressing with Modified [place].  LE Dressing with Modified [place].  Grooming while standing at sink with Modified [place].  Toileting from toilet with Modified [place] for hygiene and clothing management.   Supine to sit with Modified [place].  Step transfer with Modified [place]  Toilet transfer to toilet with Modified [place].  Upper extremity exercise program x15 reps per handout, with independence.  LUE AROM/AAROM/PROM exercise program x15 reps w/ assistance as needed.     Outcome: Adequate for Care Transition   The patient is able to perform self care and functional mobility with (S). The patient was educated re: Pendulum exercise to the RUE with good tolerance.   The patient states she has arranged help 2 hours/day at home and will go to out patient OT as ordered. The patient will benefit from a straight cane and a rub bench for home.

## 2020-11-20 NOTE — NURSING
VSS, NAD, Afebrile, voids without difficulty - up to toilet. Discharge instructions explained and handed to pt. Pt verbalizes understanding. Pt's cane given to pt. IV access dc. Cardiac monitoring dc. Discharged home via wheelchair, surgical mask in place.

## 2020-11-20 NOTE — PT/OT/SLP PROGRESS
Occupational Therapy   Treatment/Discharge    Name: Alicia Chu  MRN: [id number]  Admitting Diagnosis:  Fracture of right shoulder girdle, part unspecified, initial encounter for open fracture       Recommendations:     Discharge Recommendations: (IPR recommendent but the patient is going home with OP OT)  Discharge Equipment Recommendations:  bath bench, cane, straight  Barriers to discharge:  (bed and bathroom is on the 2nd floor, fall risk)    Assessment:     Alicia Chu is a 60 y.o. female with a medical diagnosis of Fracture of right shoulder girdle, part unspecified, initial encounter for open fracture.   Performance deficits affecting function are impaired self care skills, impaired functional mobilty, gait instability, impaired balance, decreased upper extremity function, decreased safety awareness, pain, decreased ROM, orthopedic precautions.   The patient is able to perform self care and functional mobility with (S). The patient was educated re: Pendulum exercise to the RUE with good tolerance. The patient was provided a handout re: Pendulum exercises and home safety/fall prevention.  The patient states she has arranged help 2 hours/day at home and will go to out patient OT as ordered. The patient will benefit from a straight cane and a rub bench for home.           Rehab Prognosis:  Good and Fair; patient would benefit from acute skilled OT services to address these deficits and reach maximum level of function.       Plan:     Patient to be seen 5 x/week to address the above listed problems via self-care/home management, therapeutic activities, therapeutic exercises  · Plan of Care Expires: 12/01/20  · Plan of Care Reviewed with: patient    Subjective     Pain/Comfort:  · Pain Rating 1: 6/10  · Location - Side 1: Right  · Location 1: shoulder  · Pain Addressed 1: Pre-medicate for activity, Cessation of Activity    Objective:     Communicated with: Katelin david prior to session.  Patient found HOB  elevated with bed alarm, telemetry upon OT entry to room.    General Precautions: Standard, fall   Orthopedic Precautions:RUE non weight bearing   Braces: UE Sling(RUE)     Occupational Performance:     Bed Mobility:    · Patient completed Scooting/Bridging with modified independence  · Patient completed Supine to Sit with modified independence     Functional Mobility/Transfers:  · Patient completed Sit <> Stand Transfer with modified independence and supervision  with  straight cane   · Patient completed Toilet Transfer Step Transfer technique with modified independence and supervision with  grab bars and straight cane  · Functional Mobility: The patient amb using a cane from the bed to the toilet>sink>chair with (S). No LOB.    Activities of Daily Living:  · Grooming: modified independence and supervision to stand at the sink to brush her teeth and wash her face  · Bathing: modified independence to stand at the sink to perform sponge bath  · Upper Body Dressing: supervision and verbal cues to don RUE sling and Mod I to don back gown  · Toileting: modified independence and set up assist (toilet paper on the right side and unable to reach)      [facility] 6 Click ADL: 23    Treatment & Education:  · The patient was educated re: dressing technique with NWB to RUE  · Educated via handout Home Safety/Fall Prevention  · The patient was able to perform AROMM to right elbow flex/ext, pronation/sup and wrist ext/flex x20 reps.  · Educated re: pendulum exercises to right shoulder, flexion/extension circumduction  · The patient performed pendulum exercises per handout x20 reps  · The patient was educated re: need to perform HEP 3x/day x5 min    Patient left up in chair with all lines intact, call button in reach, chair alarm on and nursea notifiedEducation:      GOALS:   Multidisciplinary Problems     Occupational Therapy Goals        Problem: Occupational Therapy Goal    Goal Priority Disciplines Outcome Interventions    Occupational Therapy Goal     OT, PT/OT Adequate for Care Transition    Description: Goals to be met by: 12/1/2020    Patient will increase functional independence with ADLs by performing:    UE Dressing with Modified [place].  LE Dressing with Modified [place].  Grooming while standing at sink with Modified [place].  Toileting from toilet with Modified [place] for hygiene and clothing management.   Supine to sit with Modified [place].  Step transfer with Modified [place]  Toilet transfer to toilet with Modified [place].  Upper extremity exercise program x15 reps per handout, with independence.  LUE AROM/AAROM/PROM exercise program x15 reps w/ assistance as needed.                      Time Tracking:     OT Date of Treatment: 11/20/20  OT Start Time: 1034  OT Stop Time: 1119  OT Total Time (min): 45 min    Billable Minutes:Self Care/Home Management 15  Therapeutic Activity 15  Therapeutic Exercise 15  Total Time 45    Sara Samaniego OT  11/20/2020

## 2020-11-20 NOTE — DISCHARGE INSTRUCTIONS
WRITTEN HEALTHCARE DISCHARGE INFORMATION      Things that YOU are responsible for to Manage Your Care At Home:  1. Getting your prescriptions filled.  2. Taking you medications as directed. DO NOT MISS ANY DOSES!  3. Going to your follow-up doctor appointments. This is important because it allows the doctor to monitor your progress and to determine if any changes need to be made to your treatment plan.     If you are unable to make your follow up appointments, please call the number listed and reschedule this appointment.      After discharge, if you need assistance, you can call [facility] On [facility] Line for 24/7 assistance at [phone]     If you are experience any signs or symptoms, Call your doctor or Call [phone] and come to your nearest Emergency Room.     Thank you for choosing [facility] and allowing us to care for you.        You should receive a call from [facility] Discharge Department within 48-72 hours to help manage your care after discharge. Please try to make sure that you answer your phone for this important phone call.

## 2020-11-20 NOTE — PLAN OF CARE
Problem: Fall Injury Risk  Goal: Absence of Fall and Fall-Related Injury  Outcome: Ongoing, Progressing     Problem: Adult Inpatient Plan of Care  Goal: Plan of Care Review  Outcome: Ongoing, Progressing  Goal: Patient-Specific Goal (Individualization)  Outcome: Ongoing, Progressing  Goal: Absence of Hospital-Acquired Illness or Injury  Outcome: Ongoing, Progressing  Goal: Optimal Comfort and Wellbeing  Outcome: Ongoing, Progressing  Goal: Readiness for Transition of Care  Outcome: Ongoing, Progressing  Goal: Rounds/Family Conference  Outcome: Ongoing, Progressing     Problem: Wound  Goal: Optimal Wound Healing  Outcome: Ongoing, Progressing     Problem: Infection  Goal: Infection Symptom Resolution  Outcome: Ongoing, Progressing     Problem: Skin Injury Risk Increased  Goal: Skin Health and Integrity  Outcome: Ongoing, Progressing   Patient worked with  therapy today, pain was contrlled

## 2020-11-20 NOTE — PLAN OF CARE
EDUCATION:  Patient discharge instructions updated to include educational information on shoulder fracture that will be printed on patient's AVS to review upon discharge; Information reviewed with patient and include  signs and symptoms to look for and call the doctor if experiencing, and symptoms that may indicate a medical emergency: CALL 911.    All questions answered.  Teach back method used.        Reviewed with patient things that she can do to better manage her care at home to include taking all medications as precscribed and make sure patient attend all follow up visits;     F/U appointments with PCP/Ortho were reviewed;  verbalized understanding     NurseJanki notified that all discharge planning needs have been addressed and patient can be discharged from  standpoint        11/20/20 143   Final Note   Assessment Type Final Discharge Note   Anticipated Discharge Disposition Home   What phone number can be called within the next 1-3 days to see how you are doing after discharge? [phone]   Hospital Follow Up  Appt(s) scheduled? Yes   Discharge plans and expectations educations in teach back method with documentation complete? Yes   Right Care Referral Info   Post Acute Recommendation No Care   Post-Acute Status   Post-Acute Authorization Other   Post-Acute Placement Status Discharge Plan Changed   E Status Set-up Complete  (S. Cane delivered to room)   Other Status See Comments  (Ambulatory referral to outpatient therapy)   Discharge Delays None known at this time

## 2020-11-20 NOTE — DISCHARGE SUMMARY
"[facility] Hospital Medicine  Discharge Summary      Patient Name: Alicia Chu  MRN: [id number]  Admission Date: 11/16/2020  Hospital Length of Stay: 4 days  Discharge Date and Time:  11/20/2020 11:30 AM  Attending Physician: Ailyn Mccarthy MD   Discharging Provider: Ailyn Mccarthy MD  Primary Care Provider: Primary Doctor No      HPI:   Ms. Chu is a 61yo lady with a past medical history of ETOH abuse, HTN, gastric bypass and psychiatric issues related to mood disorder from ETOH abuse.  She was recently discharged from rehab on 11/5/20 after a 5 week stay due to alcohol abuse related leg weakness (she could not walk).     She did well after the stint in rehab until this past Saturday when she developed the sudden onset of dizziness.  Despite feeling dizzy, she decided to go up the stairs.  Unfortunately she made it to the 6th of 12 steps, then lost her balance and tumbled down the hardwood steps to the tiled floor below.  She struck her right should and head.  She states she did not pass out before or after the fall (did not lose consciousness).    She did, however, immediately have right shoulder pain, so she called 911.  They came and recommended that she go to the ED for evaluation.  She refused, stayed home, and decided to self medicate the shoulder pain with, "a few bloody yosvany."  She tells me, "these did nothing for the pain, so I finally decided to come in."    Her only other major complaint is nausea due to the pain.    * No surgery found *      Hospital Course:   Ms. Chu is a 61yo lady with a past medical history of ETOH abuse, HTN, gastric bypass and psychiatric issues related to mood disorder from ETOH abuse.  She was recently discharged from rehab on 11/5/20 after a 5 week stay due to alcohol abuse related leg weakness (she could not walk).   She did well after the stint in rehab until this past Saturday when she developed the sudden onset of dizziness.  " "Despite feeling dizzy, she decided to go up the stairs.  Unfortunately she made it to the 6th of 12 steps, then lost her balance and tumbled down the hardwood steps to the tiled floor below.  She struck her right should and head.  She states she did not pass out before or after the fall (did not lose consciousness).  She did, however, immediately have right shoulder pain, so she called 911.  They came and recommended that she go to the ED for evaluation.  She refused, stayed home, and decided to self medicate the shoulder pain with, "a few bloody yosvany."  She states, "these did nothing for the pain, so I finally decided to come in."  CT show,  Undisplaced fracture of the greater tuberosity. orthopedic is consulted,deceided non operative management.consulted PT,OT recommending rehab,consulted SW.  Patient denies dizziness,no sign of focal neuro deficiency during hospital stay.her pain is improved,patient refused rehab placement,want go home with out patient PT,OT.patient was discharged home with pan medications and PT,OT and follow up with PC and orthopedic as out patient.       Consults:   Consults (From admission, onward)        Status Ordering Provider     Inpatient consult to Orthopedic Surgery  Once     Provider:  Kimberly Ramon III, MD    Completed CHRISTIE HAGEN     Inpatient consult to Social Work  Once     Provider:  (Not yet assigned)    Completed RAYMON VIVEROS     Inpatient consult to Social Work  Once     Provider:  (Not yet assigned)    Completed RAYMON VIVEROS     Inpatient consult to Social Work  Once     Provider:  (Not yet assigned)    Acknowledged RAYMON VIVEROS          No new Assessment & Plan notes have been filed under this hospital service since the last note was generated.  Service: Hospital Medicine    Final Active Diagnoses:    Diagnosis Date Noted POA    PRINCIPAL PROBLEM:  Fracture of right shoulder girdle, part unspecified, initial encounter for open fracture " "[S42.91XB] 11/16/2020 Yes    Hypokalemia [E87.6] 11/17/2020 Yes    Hypomagnesemia [E83.42] 11/17/2020 Yes    Prolonged QT interval [R94.31] 11/17/2020 Yes    Fall down stairs [W10.8XXA] 11/17/2020 Yes    Alcoholism [F10.20] 11/17/2020 Yes      Problems Resolved During this Admission:    Diagnosis Date Noted Date Resolved POA    Fall [W19.XXXA] 11/17/2020 11/17/2020 Unknown    Essential hypertension [I10]  11/17/2020 Yes       Discharged Condition: stable    Disposition: Home or Self Care    Follow Up:  Follow-up Information     Primary Doctor Nacho Monson In 1 week.    Why: Please call clinic to establish care/arrange hospital discharge follow up visit in 1 week  Contact information:  [address] [facility] [address]  [phone]             Gaston Hinkle MD In 1 week.    Specialty: Orthopedic Surgery  Contact information:  [address] [facility]  [facility] [address]  [phone]             oJsy Lee MD.    Specialty: Orthopedic Surgery  Contact information:  [address] [facility]  [facility]  [facility] [address]  [phone]                 Patient Instructions:      WALKER FOR HOME USE     Order Specific Question Answer Comments   Type of Walker: Adult (5'4"-6'6")    With wheels? Yes    Height: 5' 5" (1.651 m)    Weight: 70.3 kg (155 lb)    Length of need (1-99 months): 99    Does patient have medical equipment at home? walker, standard    Does patient have medical equipment at home? rollator    Please check all that apply: Patient's condition impairs ambulation.    Please check all that apply: Patient is unable to safely ambulate without equipment.      COMMODE FOR HOME USE     Order Specific Question Answer Comments   Type: Standard    Height: 5' 5" (1.651 m)    Weight: 70.3 kg (155 lb)    Does patient have medical equipment at home? walker, standard    Does patient have medical equipment at home? rollator    Length of need (1-99 months): 99  " "    BATH/SHOWER CHAIR FOR HOME USE     Order Specific Question Answer Comments   Height: 5' 5" (1.651 m)    Weight: 70.3 kg (155 lb)    Does patient have medical equipment at home? walker, standard    Does patient have medical equipment at home? rollator    Length of need (1-99 months): 99    Type: With back      CANE FOR HOME USE     Order Specific Question Answer Comments   Type of Cane: Straight    Height: 5' 5" (1.651 m)    Weight: 70.3 kg (155 lb)    Length of need (1-99 months): 99    Please check all that apply: Patient's condition impairs ambulation.      Ambulatory referral/consult to Physical/Occupational Therapy   Standing Status: Future   Referral Priority: Routine Referral Type: Physical Medicine   Referral Reason: Specialty Services Required   Number of Visits Requested: 1     Activity as tolerated     Activity as tolerated       Significant Diagnostic Studies: Labs:   BMP:   Recent Labs   Lab 11/19/20  0413 11/20/20  0546   GLU 89 84    140   K 3.5 4.1    109   CO2 26 24   BUN 9 9   CREATININE 0.8 0.8   CALCIUM 7.9* 8.1*   MG 1.6 1.6   , CMP   Recent Labs   Lab 11/19/20  0413 11/20/20  0546    140   K 3.5 4.1    109   CO2 26 24   GLU 89 84   BUN 9 9   CREATININE 0.8 0.8   CALCIUM 7.9* 8.1*   ANIONGAP 2* 7*   ESTGFRAFRICA >60 >60   EGFRNONAA >60 >60    and CBC   Recent Labs   Lab 11/19/20  0413 11/20/20  0546   WBC 3.04* 3.11*   HGB 9.2* 9.4*   HCT 29.7* 29.8*    167     Radiology: X-Ray: CXR: X-Ray Chest 1 View (CXR): No results found for this visit on 11/16/20. and X-Ray Chest PA and Lateral (CXR):   Results for orders placed or performed during the hospital encounter of 11/16/20   X-Ray Chest PA And Lateral    Narrative    EXAMINATION:  XR CHEST PA AND LATERAL    CLINICAL HISTORY:  Unspecified fall, initial encounter    TECHNIQUE:  PA and lateral views of the chest were performed.    COMPARISON:  09/03/2020    FINDINGS:  Cardiac silhouette and pulmonary vascularity are " within normal limits.  Lungs are symmetrically expanded and show no evidence of significant focal consolidation, large effusion or pneumothorax.  Bones demonstrated a remote right clavicular fracture.  No evidence of acute fracture.  There are cholecystectomy clips.      Impression    No acute radiographic findings in the chest.      Electronically signed by: Thiago Smiley MD  Date:    11/16/2020  Time:    17:13     CT scan: shoulder     Pending Diagnostic Studies:     None         Medications:  Reconciled Home Medications:      Medication List      START taking these medications    oxyCODONE 5 MG immediate release tablet  Commonly known as: ROXICODONE  Take 1 tablet (5 mg total) by mouth every 6 (six) hours as needed.        CONTINUE taking these medications    buPROPion 100 MG tablet  Commonly known as: WELLBUTRIN  Take 1 tablet (100 mg total) by mouth once daily.     FLUoxetine 20 MG capsule  Take 3 capsules (60 mg total) by mouth once daily. Also taking Fluoxetine 40 mg QAM     folic acid 1 MG tablet  Commonly known as: FOLVITE  Take 1 tablet (1 mg total) by mouth once daily.     gabapentin 100 MG capsule  Commonly known as: NEURONTIN  Take 100 mg by mouth 3 (three) times daily.     hydrOXYzine pamoate 25 MG Cap  Commonly known as: VISTARIL     multivitamin Tab  Take 1 tablet by mouth once daily.     pantoprazole 40 MG tablet  Commonly known as: PROTONIX  Take 1 tablet (40 mg total) by mouth 2 (two) times daily.     thiamine 100 MG tablet  Take 1 tablet (100 mg total) by mouth once daily.     TRAZODONE ORAL        STOP taking these medications    chlordiazepoxide 25 MG Cap  Commonly known as: LIBRIUM            Indwelling Lines/Drains at time of discharge:   Lines/Drains/Airways     None                 Time spent on the discharge of patient: over 60  minutes  Patient was seen and examined on the date of discharge and determined to be suitable for discharge.         Ailyn Mccarthy MD  Department of  Hospital Medicine [facility]

## 2020-11-20 NOTE — PHYSICIAN QUERY
PT Name: Alicia Chu  MR #: [id number]    HEMATOLOGY CLARIFICATION      CDS/: Janki Pereira RN               Contact information:[email or web address]    This form is a permanent document in the medical record.      Query Date: November 20, 2020    By submitting this query, we are merely seeking further clarification of documentation. Please utilize your independent clinical judgment when addressing the question(s) below.    The Medical Record contains the following:   Indicators  Supporting Clinical Findings Location in Medical Record    Anemia documented     x H&H H&H=  12.6/37.7  H&H=  10.8/32.7  H&H=   9.8/31.9  H&H=   9.2/29.7  H&H=   9.4/29.8 Labs 11/16  Labs 11/17  Labs 11/18  Labs 11/19  Labs 11/20   x BP                    HR BP: (134-154)/(82-91)   Pulse:  [82-94]   BP: (125-150)/(72-92)  Pulse:  [73-79]  BP: (121-139)/(71-85)   Pulse:  [74-92]   /77  HR 70 Hospital medicine H&P  Hospital Medicine 11/18  [facility] 11/19  Vital signs 11/20@0519    GI bleeding documented      Acute bleeding (Non GI site)      Transfusion(s)     x Acute/Chronic illness  R shoulder fracture, alcoholism, prolonged QT interval, hypoMagnesemia, hypokalemia [facility] 11/19    Treatments     x Other Non operative management of right shoulder greater tuberosity fracture.   - Sling for comfort Orthopedic surgery consult 11/17     Provider, please specify diagnosis or diagnoses associated with above clinical findings.   [   ] Acute blood loss anemia    [ x  ] Anemia, unspecified    [   ] Other Hematological Diagnosis (please specify): _________________   [   ] Clinically Undetermined     Present on admission (POA) status:   [ x  ] Yes (Y)   [   ] No (N)   [   ] Documentation insufficient to determine if condition is POA (U)   [  ] Clinically Undetermined (W)          Please document in your progress notes daily for the duration of treatment, until resolved, and include in your discharge  summary.    Form No. 02171

## 2020-11-20 NOTE — PLAN OF CARE
Pt ordered to receive BSC, Bath Bench and straight cane; approved to have BSC and cane pulled per DAVID Vicente; pt declined BSC stating that she has access to one and declined bath bench stating that she will order her own; pt did accept straight care that was delivered to her room       11/20/20 8456   Post-Acute Status   Post-Acute Authorization HME   E Status Set-up Complete

## 2020-11-22 NOTE — PT/OT/SLP DISCHARGE
Physical Therapy Discharge Summary    Name: Alicia Chu  MRN: [id number]   Principal Problem: Fracture of right shoulder girdle, part unspecified, initial encounter for open fracture     Patient Discharged from acute Physical Therapy on 20.  Please refer to prior PT noted date on 20 for functional status.     Assessment:     Patient was discharged unexpectedly.  Information required to complete an accurate discharge summary is unknown.  Refer to therapy initial evaluation and last progress note for initial and most recent functional status and goal achievement.  Recommendations made may be found in medical record.    Objective:     GOALS:   Multidisciplinary Problems     Physical Therapy Goals        Problem: Physical Therapy Goal    Goal Priority Disciplines Outcome Goal Variances Interventions   Physical Therapy Goal     PT, PT/OT Ongoing, Progressing     Description: Goals to be met by: 20     Patient will increase functional independence with mobility by performin. Sit to stand transfer with Modified [place]  2. Gait  x 150 feet with Modified [place] using Rolling Walker.   3. Lower extremity exercise program x10 reps per handout, with independence  4.10 stairs with HR and SBA                       Reasons for Discontinuation of Therapy Services  Transfer to alternate level of care.      Plan:     Patient Discharged to: IPR recommended, but patient opted for home with [facility].    Audra Salas, PT  2020

## 2021-02-01 ENCOUNTER — LAB VISIT (OUTPATIENT)
Dept: LAB | Facility: OTHER | Age: 61
End: 2021-02-01
Payer: MEDICAID

## 2021-02-01 DIAGNOSIS — Z20.822 ENCOUNTER FOR LABORATORY TESTING FOR COVID-19 VIRUS: ICD-10-CM

## 2021-02-01 PROCEDURE — U0003 INFECTIOUS AGENT DETECTION BY NUCLEIC ACID (DNA OR RNA); SEVERE ACUTE RESPIRATORY SYNDROME CORONAVIRUS 2 (SARS-COV-2) (CORONAVIRUS DISEASE [COVID-19]), AMPLIFIED PROBE TECHNIQUE, MAKING USE OF HIGH THROUGHPUT TECHNOLOGIES AS DESCRIBED BY CMS-2020-01-R: HCPCS

## 2021-02-02 LAB — SARS-COV-2 RNA RESP QL NAA+PROBE: NOT DETECTED

## 2021-02-08 ENCOUNTER — LAB VISIT (OUTPATIENT)
Dept: LAB | Facility: OTHER | Age: 61
End: 2021-02-08
Payer: MEDICAID

## 2021-02-08 DIAGNOSIS — Z20.822 ENCOUNTER FOR LABORATORY TESTING FOR COVID-19 VIRUS: ICD-10-CM

## 2021-02-08 PROCEDURE — U0003 INFECTIOUS AGENT DETECTION BY NUCLEIC ACID (DNA OR RNA); SEVERE ACUTE RESPIRATORY SYNDROME CORONAVIRUS 2 (SARS-COV-2) (CORONAVIRUS DISEASE [COVID-19]), AMPLIFIED PROBE TECHNIQUE, MAKING USE OF HIGH THROUGHPUT TECHNOLOGIES AS DESCRIBED BY CMS-2020-01-R: HCPCS

## 2021-02-09 LAB — SARS-COV-2 RNA RESP QL NAA+PROBE: NOT DETECTED

## 2021-02-22 ENCOUNTER — LAB VISIT (OUTPATIENT)
Dept: LAB | Facility: OTHER | Age: 61
End: 2021-02-22
Payer: MEDICAID

## 2021-02-22 DIAGNOSIS — Z20.822 ENCOUNTER FOR LABORATORY TESTING FOR COVID-19 VIRUS: ICD-10-CM

## 2021-02-22 PROCEDURE — U0003 INFECTIOUS AGENT DETECTION BY NUCLEIC ACID (DNA OR RNA); SEVERE ACUTE RESPIRATORY SYNDROME CORONAVIRUS 2 (SARS-COV-2) (CORONAVIRUS DISEASE [COVID-19]), AMPLIFIED PROBE TECHNIQUE, MAKING USE OF HIGH THROUGHPUT TECHNOLOGIES AS DESCRIBED BY CMS-2020-01-R: HCPCS

## 2021-02-23 LAB — SARS-COV-2 RNA RESP QL NAA+PROBE: NOT DETECTED

## 2022-07-19 ENCOUNTER — PATIENT MESSAGE (OUTPATIENT)
Dept: RESEARCH | Facility: CLINIC | Age: 62
End: 2022-07-19

## 2025-01-11 NOTE — NURSING
Patient c/o left dorsal foot pain 7/10, doesn't know what caused it. Area is tender, slightly swollen, pink, warm. Showbeny PA notified. Tylenol ordered for pain, Xray Left foot ordered.  Patient cleaned of remaining fecal material to abd, buttocks, legs, feet. Buttocks red and bleeding slightly from IAD. Extends to rajeev area. Entire area cleansed and zinc paste applied, left ABIGAIL. Patient thanked nurse and tech for their kindness.   Patient received sleeping. Safety rounds ongoing every 15 minutes.    Hours slept: 6.5